# Patient Record
Sex: MALE | Race: WHITE | Employment: OTHER | URBAN - METROPOLITAN AREA
[De-identification: names, ages, dates, MRNs, and addresses within clinical notes are randomized per-mention and may not be internally consistent; named-entity substitution may affect disease eponyms.]

---

## 2017-01-12 ENCOUNTER — OFFICE VISIT (OUTPATIENT)
Dept: FAMILY MEDICINE CLINIC | Age: 62
End: 2017-01-12

## 2017-01-12 VITALS
WEIGHT: 202 LBS | BODY MASS INDEX: 25.25 KG/M2 | DIASTOLIC BLOOD PRESSURE: 80 MMHG | RESPIRATION RATE: 16 BRPM | SYSTOLIC BLOOD PRESSURE: 140 MMHG | HEART RATE: 80 BPM

## 2017-01-12 DIAGNOSIS — M25.521 RIGHT ELBOW PAIN: ICD-10-CM

## 2017-01-12 DIAGNOSIS — M25.421 ELBOW EFFUSION, RIGHT: Primary | ICD-10-CM

## 2017-01-12 DIAGNOSIS — L03.113 CELLULITIS OF RIGHT UPPER EXTREMITY: ICD-10-CM

## 2017-01-12 PROCEDURE — 99213 OFFICE O/P EST LOW 20 MIN: CPT | Performed by: FAMILY MEDICINE

## 2017-01-12 RX ORDER — TRAMADOL HYDROCHLORIDE 50 MG/1
50 TABLET ORAL EVERY 6 HOURS PRN
Qty: 20 TABLET | Refills: 0 | Status: SHIPPED | OUTPATIENT
Start: 2017-01-12 | End: 2017-02-20 | Stop reason: ALTCHOICE

## 2017-01-12 RX ORDER — CLINDAMYCIN HYDROCHLORIDE 150 MG/1
150 CAPSULE ORAL 4 TIMES DAILY
Qty: 40 CAPSULE | Refills: 0 | Status: SHIPPED | OUTPATIENT
Start: 2017-01-12 | End: 2017-01-17

## 2017-01-12 RX ORDER — IBUPROFEN 200 MG
800 TABLET ORAL EVERY 8 HOURS PRN
COMMUNITY
End: 2019-01-04

## 2017-01-12 RX ORDER — PREDNISONE 20 MG/1
TABLET ORAL
Qty: 30 TABLET | Refills: 0 | Status: SHIPPED | OUTPATIENT
Start: 2017-01-12 | End: 2017-01-22

## 2017-01-12 ASSESSMENT — ENCOUNTER SYMPTOMS
ABDOMINAL DISTENTION: 0
EYE PAIN: 0
DIARRHEA: 0
SHORTNESS OF BREATH: 0
RHINORRHEA: 0
SINUS PRESSURE: 0
ABDOMINAL PAIN: 0
NAUSEA: 0
CONSTIPATION: 0
SORE THROAT: 0
COUGH: 0

## 2017-01-17 ENCOUNTER — OFFICE VISIT (OUTPATIENT)
Dept: FAMILY MEDICINE CLINIC | Age: 62
End: 2017-01-17

## 2017-01-17 VITALS
HEART RATE: 64 BPM | RESPIRATION RATE: 16 BRPM | DIASTOLIC BLOOD PRESSURE: 68 MMHG | BODY MASS INDEX: 25.75 KG/M2 | TEMPERATURE: 98.7 F | SYSTOLIC BLOOD PRESSURE: 142 MMHG | WEIGHT: 206 LBS

## 2017-01-17 DIAGNOSIS — M70.21 OLECRANON BURSITIS OF RIGHT ELBOW: Primary | ICD-10-CM

## 2017-01-17 PROCEDURE — 99213 OFFICE O/P EST LOW 20 MIN: CPT | Performed by: FAMILY MEDICINE

## 2017-01-17 ASSESSMENT — PATIENT HEALTH QUESTIONNAIRE - PHQ9
1. LITTLE INTEREST OR PLEASURE IN DOING THINGS: 0
SUM OF ALL RESPONSES TO PHQ9 QUESTIONS 1 & 2: 0
2. FEELING DOWN, DEPRESSED OR HOPELESS: 0
SUM OF ALL RESPONSES TO PHQ QUESTIONS 1-9: 0

## 2017-01-17 ASSESSMENT — ENCOUNTER SYMPTOMS
ABDOMINAL PAIN: 0
DIARRHEA: 0
CONSTIPATION: 0
SHORTNESS OF BREATH: 0
EYE PAIN: 0
ABDOMINAL DISTENTION: 0
RHINORRHEA: 0
SORE THROAT: 0
NAUSEA: 0
COUGH: 0
SINUS PRESSURE: 0

## 2017-03-06 ENCOUNTER — OFFICE VISIT (OUTPATIENT)
Dept: FAMILY MEDICINE CLINIC | Age: 62
End: 2017-03-06

## 2017-03-06 VITALS
HEIGHT: 75 IN | RESPIRATION RATE: 16 BRPM | DIASTOLIC BLOOD PRESSURE: 72 MMHG | TEMPERATURE: 98.4 F | HEART RATE: 72 BPM | BODY MASS INDEX: 24.74 KG/M2 | WEIGHT: 199 LBS | SYSTOLIC BLOOD PRESSURE: 140 MMHG

## 2017-03-06 DIAGNOSIS — M15.9 PRIMARY OSTEOARTHRITIS INVOLVING MULTIPLE JOINTS: Chronic | ICD-10-CM

## 2017-03-06 DIAGNOSIS — E34.9 HYPOTESTOSTERONISM: Chronic | ICD-10-CM

## 2017-03-06 DIAGNOSIS — J40 BRONCHITIS: Primary | ICD-10-CM

## 2017-03-06 DIAGNOSIS — R63.4 WEIGHT LOSS: ICD-10-CM

## 2017-03-06 DIAGNOSIS — R53.83 FATIGUE, UNSPECIFIED TYPE: ICD-10-CM

## 2017-03-06 PROCEDURE — 99213 OFFICE O/P EST LOW 20 MIN: CPT | Performed by: NURSE PRACTITIONER

## 2017-03-06 RX ORDER — PREDNISONE 20 MG/1
20 TABLET ORAL 2 TIMES DAILY
Qty: 10 TABLET | Refills: 0 | Status: SHIPPED | OUTPATIENT
Start: 2017-03-06 | End: 2017-03-11

## 2017-03-06 RX ORDER — DEXTROMETHORPHAN HYDROBROMIDE AND PROMETHAZINE HYDROCHLORIDE 15; 6.25 MG/5ML; MG/5ML
5 SYRUP ORAL 3 TIMES DAILY PRN
Qty: 120 ML | Refills: 0 | Status: SHIPPED | OUTPATIENT
Start: 2017-03-06 | End: 2017-03-13

## 2017-03-06 RX ORDER — AZITHROMYCIN 250 MG/1
TABLET, FILM COATED ORAL
Qty: 6 TABLET | Refills: 0 | Status: SHIPPED | OUTPATIENT
Start: 2017-03-06 | End: 2017-03-16

## 2017-03-10 ASSESSMENT — ENCOUNTER SYMPTOMS
GASTROINTESTINAL NEGATIVE: 1
EYES NEGATIVE: 1
COUGH: 1
SINUS PRESSURE: 1

## 2017-10-16 RX ORDER — LISINOPRIL 20 MG/1
TABLET ORAL
Qty: 30 TABLET | Refills: 5 | Status: SHIPPED | OUTPATIENT
Start: 2017-10-16 | End: 2018-04-21 | Stop reason: SDUPTHER

## 2017-10-30 ENCOUNTER — TELEPHONE (OUTPATIENT)
Dept: FAMILY MEDICINE CLINIC | Age: 62
End: 2017-10-30

## 2017-10-30 NOTE — TELEPHONE ENCOUNTER
Patient is wanting an appt today for his swollen inflamed left ankle, he had the same thing with his elbow previously. He needs to get in prior to going into work this afternoon.

## 2018-04-23 RX ORDER — LISINOPRIL 20 MG/1
TABLET ORAL
Qty: 30 TABLET | Refills: 0 | Status: SHIPPED | OUTPATIENT
Start: 2018-04-23 | End: 2018-05-23 | Stop reason: SDUPTHER

## 2018-05-23 ENCOUNTER — OFFICE VISIT (OUTPATIENT)
Dept: FAMILY MEDICINE CLINIC | Age: 63
End: 2018-05-23
Payer: COMMERCIAL

## 2018-05-23 VITALS
BODY MASS INDEX: 24.62 KG/M2 | SYSTOLIC BLOOD PRESSURE: 134 MMHG | HEIGHT: 75 IN | WEIGHT: 198 LBS | RESPIRATION RATE: 16 BRPM | DIASTOLIC BLOOD PRESSURE: 70 MMHG | HEART RATE: 68 BPM

## 2018-05-23 DIAGNOSIS — Z12.5 SCREENING PSA (PROSTATE SPECIFIC ANTIGEN): ICD-10-CM

## 2018-05-23 DIAGNOSIS — I10 ESSENTIAL HYPERTENSION: Primary | ICD-10-CM

## 2018-05-23 DIAGNOSIS — Z11.59 NEED FOR HEPATITIS C SCREENING TEST: ICD-10-CM

## 2018-05-23 PROCEDURE — 99213 OFFICE O/P EST LOW 20 MIN: CPT | Performed by: FAMILY MEDICINE

## 2018-05-23 RX ORDER — LISINOPRIL 20 MG/1
TABLET ORAL
Qty: 90 TABLET | Refills: 3 | Status: SHIPPED | OUTPATIENT
Start: 2018-05-23 | End: 2019-06-10 | Stop reason: SDUPTHER

## 2018-05-24 ASSESSMENT — ENCOUNTER SYMPTOMS
SINUS PRESSURE: 0
SORE THROAT: 0
ABDOMINAL DISTENTION: 0
RHINORRHEA: 0
CONSTIPATION: 0
NAUSEA: 0
SHORTNESS OF BREATH: 0
COUGH: 0
EYE PAIN: 0
DIARRHEA: 0
ABDOMINAL PAIN: 0

## 2018-09-11 ENCOUNTER — HOSPITAL ENCOUNTER (OUTPATIENT)
Age: 63
Discharge: HOME | End: 2018-09-11
Payer: COMMERCIAL

## 2018-09-11 DIAGNOSIS — I10 ESSENTIAL HYPERTENSION: ICD-10-CM

## 2018-09-11 DIAGNOSIS — Z12.5 SCREENING PSA (PROSTATE SPECIFIC ANTIGEN): ICD-10-CM

## 2018-09-11 DIAGNOSIS — Z11.59 NEED FOR HEPATITIS C SCREENING TEST: ICD-10-CM

## 2018-09-11 LAB
ALBUMIN SERPL-MCNC: 4.8 G/DL (ref 3.5–5.1)
ALP BLD-CCNC: 103 U/L (ref 38–126)
ALT SERPL-CCNC: 18 U/L (ref 11–66)
ANION GAP SERPL CALCULATED.3IONS-SCNC: 17 MEQ/L (ref 8–16)
AST SERPL-CCNC: 22 U/L (ref 5–40)
BASOPHILS # BLD: 0.9 %
BASOPHILS ABSOLUTE: 0.1 THOU/MM3 (ref 0–0.1)
BILIRUB SERPL-MCNC: 1.7 MG/DL (ref 0.3–1.2)
BUN BLDV-MCNC: 17 MG/DL (ref 7–22)
CALCIUM SERPL-MCNC: 9.5 MG/DL (ref 8.5–10.5)
CHLORIDE BLD-SCNC: 102 MEQ/L (ref 98–111)
CHOLESTEROL, TOTAL: 206 MG/DL (ref 100–199)
CO2: 25 MEQ/L (ref 23–33)
CREAT SERPL-MCNC: 0.7 MG/DL (ref 0.4–1.2)
EOSINOPHIL # BLD: 4.3 %
EOSINOPHILS ABSOLUTE: 0.4 THOU/MM3 (ref 0–0.4)
ERYTHROCYTE [DISTWIDTH] IN BLOOD BY AUTOMATED COUNT: 13.5 % (ref 11.5–14.5)
ERYTHROCYTE [DISTWIDTH] IN BLOOD BY AUTOMATED COUNT: 42.5 FL (ref 35–45)
GFR SERPL CREATININE-BSD FRML MDRD: > 90 ML/MIN/1.73M2
GLUCOSE BLD-MCNC: 109 MG/DL (ref 70–108)
HCT VFR BLD CALC: 43.2 % (ref 42–52)
HDLC SERPL-MCNC: 69 MG/DL
HEMOGLOBIN: 14 GM/DL (ref 14–18)
HEPATITIS C ANTIBODY: NEGATIVE
IMMATURE GRANS (ABS): 0.03 THOU/MM3 (ref 0–0.07)
IMMATURE GRANULOCYTES: 0.3 %
LDL CHOLESTEROL CALCULATED: 98 MG/DL
LYMPHOCYTES # BLD: 25.2 %
LYMPHOCYTES ABSOLUTE: 2.4 THOU/MM3 (ref 1–4.8)
MCH RBC QN AUTO: 28 PG (ref 26–33)
MCHC RBC AUTO-ENTMCNC: 32.4 GM/DL (ref 32.2–35.5)
MCV RBC AUTO: 86.4 FL (ref 80–94)
MONOCYTES # BLD: 5.6 %
MONOCYTES ABSOLUTE: 0.5 THOU/MM3 (ref 0.4–1.3)
NUCLEATED RED BLOOD CELLS: 0 /100 WBC
PLATELET # BLD: 286 THOU/MM3 (ref 130–400)
PMV BLD AUTO: 10.2 FL (ref 9.4–12.4)
POTASSIUM SERPL-SCNC: 4.2 MEQ/L (ref 3.5–5.2)
PROSTATE SPECIFIC ANTIGEN: 0.55 NG/ML (ref 0–1)
RBC # BLD: 5 MILL/MM3 (ref 4.7–6.1)
SEG NEUTROPHILS: 63.7 %
SEGMENTED NEUTROPHILS ABSOLUTE COUNT: 6.1 THOU/MM3 (ref 1.8–7.7)
SODIUM BLD-SCNC: 144 MEQ/L (ref 135–145)
TOTAL PROTEIN: 7.4 G/DL (ref 6.1–8)
TRIGL SERPL-MCNC: 195 MG/DL (ref 0–199)
WBC # BLD: 9.6 THOU/MM3 (ref 4.8–10.8)

## 2018-09-11 PROCEDURE — 36415 COLL VENOUS BLD VENIPUNCTURE: CPT

## 2018-09-11 PROCEDURE — G0103 PSA SCREENING: HCPCS

## 2018-09-11 PROCEDURE — 80053 COMPREHEN METABOLIC PANEL: CPT

## 2018-09-11 PROCEDURE — 85025 COMPLETE CBC W/AUTO DIFF WBC: CPT

## 2018-09-11 PROCEDURE — 86803 HEPATITIS C AB TEST: CPT

## 2018-09-11 PROCEDURE — 80061 LIPID PANEL: CPT

## 2018-09-14 ENCOUNTER — TELEPHONE (OUTPATIENT)
Dept: FAMILY MEDICINE CLINIC | Age: 63
End: 2018-09-14

## 2018-09-14 NOTE — TELEPHONE ENCOUNTER
I left a message for the patient to call our office back in regards to lab results. Notified of phone hours.

## 2019-01-04 ENCOUNTER — OFFICE VISIT (OUTPATIENT)
Dept: FAMILY MEDICINE CLINIC | Age: 64
End: 2019-01-04
Payer: COMMERCIAL

## 2019-01-04 VITALS
HEIGHT: 74 IN | RESPIRATION RATE: 16 BRPM | HEART RATE: 82 BPM | BODY MASS INDEX: 26.18 KG/M2 | DIASTOLIC BLOOD PRESSURE: 76 MMHG | WEIGHT: 204 LBS | SYSTOLIC BLOOD PRESSURE: 140 MMHG | TEMPERATURE: 98.8 F

## 2019-01-04 DIAGNOSIS — I10 ESSENTIAL HYPERTENSION: ICD-10-CM

## 2019-01-04 DIAGNOSIS — L97.512 SKIN ULCER OF RIGHT FOOT WITH FAT LAYER EXPOSED (HCC): ICD-10-CM

## 2019-01-04 DIAGNOSIS — S91.301A OPEN WOUND OF RIGHT FOOT, INITIAL ENCOUNTER: Primary | ICD-10-CM

## 2019-01-04 PROCEDURE — 99213 OFFICE O/P EST LOW 20 MIN: CPT | Performed by: NURSE PRACTITIONER

## 2019-01-04 RX ORDER — M-VIT,TX,IRON,MINS/CALC/FOLIC 27MG-0.4MG
1 TABLET ORAL DAILY
COMMUNITY
End: 2019-12-03 | Stop reason: ALTCHOICE

## 2019-01-04 RX ORDER — SULFAMETHOXAZOLE AND TRIMETHOPRIM 800; 160 MG/1; MG/1
1 TABLET ORAL 2 TIMES DAILY
Qty: 28 TABLET | Refills: 0 | Status: SHIPPED | OUTPATIENT
Start: 2019-01-04 | End: 2019-01-09 | Stop reason: ALTCHOICE

## 2019-01-04 ASSESSMENT — PATIENT HEALTH QUESTIONNAIRE - PHQ9
1. LITTLE INTEREST OR PLEASURE IN DOING THINGS: 0
2. FEELING DOWN, DEPRESSED OR HOPELESS: 0
SUM OF ALL RESPONSES TO PHQ QUESTIONS 1-9: 0
SUM OF ALL RESPONSES TO PHQ QUESTIONS 1-9: 0
SUM OF ALL RESPONSES TO PHQ9 QUESTIONS 1 & 2: 0

## 2019-01-07 ENCOUNTER — TELEPHONE (OUTPATIENT)
Dept: FAMILY MEDICINE CLINIC | Age: 64
End: 2019-01-07

## 2019-01-09 LAB
AEROBIC CULTURE: ABNORMAL
AEROBIC CULTURE: ABNORMAL
ANAEROBIC CULTURE: ABNORMAL
GRAM STAIN RESULT: ABNORMAL
ORGANISM: ABNORMAL

## 2019-01-09 RX ORDER — CEPHALEXIN 500 MG/1
500 CAPSULE ORAL 4 TIMES DAILY
Qty: 40 CAPSULE | Refills: 0 | Status: SHIPPED | OUTPATIENT
Start: 2019-01-09 | End: 2019-07-24 | Stop reason: SDUPTHER

## 2019-06-10 DIAGNOSIS — I10 ESSENTIAL HYPERTENSION: ICD-10-CM

## 2019-06-10 RX ORDER — LISINOPRIL 20 MG/1
TABLET ORAL
Qty: 90 TABLET | Refills: 1 | Status: SHIPPED | OUTPATIENT
Start: 2019-06-10 | End: 2019-12-05 | Stop reason: SDUPTHER

## 2019-06-10 NOTE — TELEPHONE ENCOUNTER
06/10/2019   Patric Gusman called requesting a refill on the following medications:  Requested Prescriptions     Pending Prescriptions Disp Refills    lisinopril (PRINIVIL;ZESTRIL) 20 MG tablet 90 tablet 3     Sig: take 1 tablet by mouth once daily     Pharmacy verified:  [email or web address]      Date of last visit: 01/04/2019  Date of next visit (if applicable): Visit date not found

## 2019-07-24 ENCOUNTER — OFFICE VISIT (OUTPATIENT)
Dept: FAMILY MEDICINE CLINIC | Age: 64
End: 2019-07-24
Payer: COMMERCIAL

## 2019-07-24 VITALS
SYSTOLIC BLOOD PRESSURE: 140 MMHG | RESPIRATION RATE: 24 BRPM | DIASTOLIC BLOOD PRESSURE: 86 MMHG | WEIGHT: 201 LBS | HEART RATE: 72 BPM | BODY MASS INDEX: 26.16 KG/M2 | TEMPERATURE: 97.8 F

## 2019-07-24 DIAGNOSIS — L97.511 SKIN ULCER OF RIGHT FOOT, LIMITED TO BREAKDOWN OF SKIN (HCC): Primary | ICD-10-CM

## 2019-07-24 PROCEDURE — 99213 OFFICE O/P EST LOW 20 MIN: CPT | Performed by: NURSE PRACTITIONER

## 2019-07-24 RX ORDER — CEPHALEXIN 500 MG/1
500 CAPSULE ORAL 4 TIMES DAILY
Qty: 40 CAPSULE | Refills: 0 | Status: SHIPPED | OUTPATIENT
Start: 2019-07-24 | End: 2020-03-17 | Stop reason: SDUPTHER

## 2019-07-24 NOTE — LETTER
[address]  [address]  [facility] [address]  Phone: [phone]  Fax: [phone]    LEIA Lorenzana CNP        July 24, 2019     Patient: Elmo Pierce   YOB: 1955   Date of Visit: 7/24/2019       To Whom it May Concern:    Adam Webster was seen in my clinic on 7/24/2019. He may return to work on 7/28/19. If you have any questions or concerns, please don't hesitate to call.     Sincerely,         LEIA Lorenzana CNP

## 2019-07-26 LAB
AEROBIC CULTURE: NORMAL
GRAM STAIN RESULT: NORMAL

## 2019-07-29 ENCOUNTER — TELEPHONE (OUTPATIENT)
Dept: FAMILY MEDICINE CLINIC | Age: 64
End: 2019-07-29

## 2019-07-29 DIAGNOSIS — L97.511 SKIN ULCER OF RIGHT FOOT, LIMITED TO BREAKDOWN OF SKIN (HCC): Primary | ICD-10-CM

## 2019-07-30 ENCOUNTER — TELEPHONE (OUTPATIENT)
Dept: FAMILY MEDICINE CLINIC | Age: 64
End: 2019-07-30

## 2019-07-31 ASSESSMENT — ENCOUNTER SYMPTOMS
EYES NEGATIVE: 1
RESPIRATORY NEGATIVE: 1
GASTROINTESTINAL NEGATIVE: 1

## 2019-07-31 NOTE — PROGRESS NOTES
[address] [facility] [address]  Dept: [phone]  Dept Fax: [phone]  Loc: [phone]  PROGRESS NOTE      VisitDate: 7/24/2019    Joie Pineda is a 62 y.o. male who presents today for:     Chief Complaint   Patient presents with    Wound Infection     Open wound on right foot. 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 He says it started as a pinhole and is getting bigger. He has been cleaning it with peroxide and alcohol only. Not putting any antibiotic ointments on it. Subjective:  Presents with complaint of an open wound on the medial aspect right foot developed several weeks ago. He has had a history of repeated wound/ulcers to foot. Reports that he works in a [address] where he wears work boots that caused skin breakdown. Reports that ulcer has increased in size over the last week. Reports that it started as a pinhole. Has been applying peroxide and alcohol daily. Review of Systems   Constitutional: Negative. HENT: Negative. Eyes: Negative. Respiratory: Negative. Cardiovascular: Negative. Gastrointestinal: Negative. Endocrine: Negative. Genitourinary: Negative. Musculoskeletal: Positive for arthralgias and gait problem. Skin: Positive for wound. Hematological: Negative. Psychiatric/Behavioral: Negative.       Past Medical History:   Diagnosis Date    Blood circulation, collateral     Gout     Hypertension     Hypotestosteronism 5/24/2013    Osteoarthritis       Past Surgical History:   Procedure Laterality Date    APPENDECTOMY      HERNIA REPAIR  2004    ABDOMINAL    OTHER SURGICAL HISTORY  2015    excision of scrotal wall lesion     Family History   Problem Relation Age of Onset    Diabetes Maternal Grandmother     Heart Disease Maternal Grandmother         PACEMAKER     Social History     Tobacco Use    Smoking status: Never Smoker    Smokeless tobacco: Never Used   Substance Use Topics    Alcohol use: Yes     Comment: socially      Current Outpatient Medications   Medication Sig Dispense Refill    cephALEXin (KEFLEX) 500 MG capsule Take 1 capsule by mouth 4 times daily for 10 days 40 capsule 0    lisinopril (PRINIVIL;ZESTRIL) 20 MG tablet take 1 tablet by mouth once daily 90 tablet 1    Multiple Vitamins-Minerals (THERAPEUTIC MULTIVITAMIN-MINERALS) tablet Take 1 tablet by mouth daily       No current facility-administered medications for this visit. Allergies   Allergen Reactions    Amoxil [Amoxicillin] Itching     Health Maintenance   Topic Date Due    HIV screen  06/28/1971    DTaP/Tdap/Td vaccine (1 - Tdap) 06/28/1975    Diabetes screen  06/28/1996    Shingles Vaccine (1 of 2) 06/28/2006    Flu vaccine (1) 09/01/2019    Potassium monitoring  09/11/2019    Creatinine monitoring  09/11/2019    Lipid screen  09/11/2023    Colon cancer screen colonoscopy  10/14/2023    Hepatitis C screen  Completed    Pneumococcal 0-64 years Vaccine  Aged Out         Objective:     Physical Exam   Constitutional: He is oriented to person, place, and time. He appears well-developed and well-nourished. HENT:   Head: Normocephalic. Right Ear: External ear normal.   Left Ear: External ear normal.   Nose: Nose normal.   Mouth/Throat: Oropharynx is clear and moist.   Eyes: Pupils are equal, round, and reactive to light. Conjunctivae and EOM are normal.   Neck: Normal range of motion. Neck supple. Cardiovascular: Normal rate, regular rhythm, normal heart sounds and intact distal pulses. Pulmonary/Chest: Effort normal and breath sounds normal.   Abdominal: Soft. Musculoskeletal: Normal range of motion. Right foot: There is deformity. Feet:    Severe degenerative deformities noted to right foot and ankle. Ulceration noted to medial aspect right heel. multiple scars noted from previous ulcerations.   Current wound approximately 3 cm x 1

## 2019-08-06 ENCOUNTER — HOSPITAL ENCOUNTER (OUTPATIENT)
Dept: WOUND CARE | Age: 64
Discharge: HOME | End: 2019-08-06
Payer: COMMERCIAL

## 2019-08-06 VITALS
RESPIRATION RATE: 20 BRPM | TEMPERATURE: 98.3 F | OXYGEN SATURATION: 98 % | HEART RATE: 73 BPM | SYSTOLIC BLOOD PRESSURE: 175 MMHG | DIASTOLIC BLOOD PRESSURE: 75 MMHG

## 2019-08-06 PROCEDURE — 2709999900 HC NON-CHARGEABLE SUPPLY

## 2019-08-06 PROCEDURE — 2500000003 HC RX 250 WO HCPCS: Performed by: PODIATRIST

## 2019-08-06 PROCEDURE — 11042 DBRDMT SUBQ TIS 1ST 20SQCM/<: CPT

## 2019-08-06 PROCEDURE — 87070 CULTURE OTHR SPECIMN AEROBIC: CPT

## 2019-08-06 PROCEDURE — 87205 SMEAR GRAM STAIN: CPT

## 2019-08-06 PROCEDURE — 87075 CULTR BACTERIA EXCEPT BLOOD: CPT

## 2019-08-06 PROCEDURE — 97597 DBRDMT OPN WND 1ST 20 CM/<: CPT

## 2019-08-06 RX ORDER — LIDOCAINE HYDROCHLORIDE AND EPINEPHRINE BITARTRATE 20; .01 MG/ML; MG/ML
20 INJECTION, SOLUTION SUBCUTANEOUS ONCE
Status: COMPLETED | OUTPATIENT
Start: 2019-08-06 | End: 2019-08-06

## 2019-08-06 RX ORDER — CEPHALEXIN 500 MG/1
500 CAPSULE ORAL 4 TIMES DAILY
COMMUNITY
End: 2019-08-13 | Stop reason: ALTCHOICE

## 2019-08-06 RX ORDER — GREEN TEA/HOODIA GORDONII 315-12.5MG
1 CAPSULE ORAL DAILY
Qty: 15 TABLET | Refills: 0 | Status: SHIPPED | OUTPATIENT
Start: 2019-08-06 | End: 2019-08-13 | Stop reason: ALTCHOICE

## 2019-08-06 RX ORDER — CLINDAMYCIN HYDROCHLORIDE 150 MG/1
150 CAPSULE ORAL 4 TIMES DAILY
Qty: 40 CAPSULE | Refills: 0 | Status: SHIPPED | OUTPATIENT
Start: 2019-08-06 | End: 2019-08-16

## 2019-08-06 RX ORDER — DOXYCYCLINE HYCLATE 100 MG
100 TABLET ORAL 2 TIMES DAILY
Qty: 20 TABLET | Refills: 0 | Status: SHIPPED | OUTPATIENT
Start: 2019-08-06 | End: 2019-08-06

## 2019-08-06 RX ADMIN — LIDOCAINE HYDROCHLORIDE,EPINEPHRINE BITARTRATE 12 ML: 20; .01 INJECTION, SOLUTION INFILTRATION; PERINEURAL at 12:12

## 2019-08-06 ASSESSMENT — PAIN SCALES - GENERAL
PAINLEVEL_OUTOF10: 10
PAINLEVEL_OUTOF10: 10

## 2019-08-06 ASSESSMENT — PAIN DESCRIPTION - ORIENTATION: ORIENTATION: RIGHT

## 2019-08-06 ASSESSMENT — PAIN DESCRIPTION - PAIN TYPE: TYPE: ACUTE PAIN

## 2019-08-06 ASSESSMENT — PAIN DESCRIPTION - LOCATION: LOCATION: FOOT

## 2019-08-06 NOTE — PROGRESS NOTES
[address] and Procedure Note      [address] RECORD NUMBER:  [id number]  AGE: 62 y.o. GENDER: male  : 1955  EPISODE DATE:  2019    Subjective:     Chief Complaint   Patient presents with    Wound Check     right foot         HISTORY of PRESENT ILLNESS HPI     Martine Spatz is a 62 y.o. male New patient, who presents today for wound/ulcer evaluation. History of Wound Context: Patient presents with a right foot wound on his heel since . 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 Patient states that he has been cleaning it with hydrogen peroxide daily and went to go see his PCP who gave him oral Keflex. Patient is still on Keflex. Wound is very painful and has had some drainage when he change the dressing on . Patient denies any fever, chills, nausea, vomiting, diarrhea, chest pain or shortness of breath. Patient has no other complaints at this time.     Wound/Ulcer Pain Timing/Severity: constant  Quality of pain: sharp, aching  Severity:  5 / 10   Modifying Factors: Pain worsens with walking  Associated Signs/Symptoms: edema, erythema, drainage and pain        PAST MEDICAL HISTORY        Diagnosis Date    Blood circulation, collateral     Gout     Hypertension     Hypotestosteronism 2013    Osteoarthritis        PAST SURGICAL HISTORY    Past Surgical History:   Procedure Laterality Date    APPENDECTOMY      HERNIA REPAIR  2004    ABDOMINAL    OTHER SURGICAL HISTORY  2015    excision of scrotal wall lesion       FAMILY HISTORY    Family History   Problem Relation Age of Onset    Diabetes Maternal Grandmother     Heart Disease Maternal Grandmother         PACEMAKER       SOCIAL HISTORY    Social History     Tobacco Use    Smoking status: Never Smoker    Smokeless tobacco: Never Used   Substance Use Topics    Alcohol use: Yes     Comment: socially    Drug use: No       ALLERGIES    Allergies   Allergen Reactions    Amoxil [Amoxicillin] Itching       MEDICATIONS    Current Outpatient Medications on File Prior to Encounter   Medication Sig Dispense Refill    cephALEXin (KEFLEX) 500 MG capsule Take 500 mg by mouth 4 times daily      lisinopril (PRINIVIL;ZESTRIL) 20 MG tablet take 1 tablet by mouth once daily 90 tablet 1    Multiple Vitamins-Minerals (THERAPEUTIC MULTIVITAMIN-MINERALS) tablet Take 1 tablet by mouth daily       No current facility-administered medications on file prior to encounter. REVIEW OF SYSTEMS    A comprehensive review of systems was negative. Objective:      BP (!) 175/75   Pulse 73   Temp 98.3 °F (36.8 °C) (Oral)   Resp 20   SpO2 98%     Wt Readings from Last 3 Encounters:   07/24/19 201 lb (91.2 kg)   01/04/19 204 lb (92.5 kg)   05/23/18 198 lb (89.8 kg)       PHYSICAL EXAM    General Appearance: alert and oriented to person, place and time, well-developed and well-nourished, in no acute distress and alert and oriented to person, place and time    Physical Exam:   Vascular: Dorsalis pedis and posterior tibial pulses are palpable bilaterally. Skin temperature is warm to warm  from proximal tibial tuberosity to distal digits. CFT brisk to exposed digits. Edema  present. Hair growth present. Dermatologic: Full-thickness wound noted to the medial heel of the right foot. Base is fully fibrotic. There is periwound erythema, no lymphangitis. There is some purulent yellow discharge noted. No malodor. Minimal edema. Neurovascular: Gross sensation diminished    Musculoskeletal: Muscle strength testing deferred. Valgus deformity at the ankle joint on the right.     Wound 06/09/15 Other (Comment) Foot Right;Lateral #1 Right lateral foot  (Active)   Dressing Status Intact 9/8/2015 12:17 PM   Wound Cleansed Rinsed/Irrigated with saline 8/11/2015 11:00 AM   Wound Length (cm) 0.5 cm 9/8/2015 12:17 PM   Wound Width

## 2019-08-11 LAB
AEROBIC CULTURE: NORMAL
ANAEROBIC CULTURE: NORMAL
GRAM STAIN RESULT: NORMAL

## 2019-08-13 ENCOUNTER — HOSPITAL ENCOUNTER (OUTPATIENT)
Dept: WOUND CARE | Age: 64
Discharge: HOME | End: 2019-08-13
Payer: COMMERCIAL

## 2019-08-13 VITALS
OXYGEN SATURATION: 97 % | SYSTOLIC BLOOD PRESSURE: 151 MMHG | DIASTOLIC BLOOD PRESSURE: 75 MMHG | TEMPERATURE: 98.2 F | HEART RATE: 73 BPM | RESPIRATION RATE: 18 BRPM

## 2019-08-13 PROCEDURE — 97597 DBRDMT OPN WND 1ST 20 CM/<: CPT

## 2019-08-13 PROCEDURE — 2709999900 HC NON-CHARGEABLE SUPPLY

## 2019-08-13 PROCEDURE — 6370000000 HC RX 637 (ALT 250 FOR IP): Performed by: PODIATRIST

## 2019-08-13 RX ORDER — LIDOCAINE HYDROCHLORIDE 20 MG/ML
JELLY TOPICAL ONCE
Status: COMPLETED | OUTPATIENT
Start: 2019-08-13 | End: 2019-08-13

## 2019-08-13 RX ADMIN — LIDOCAINE HYDROCHLORIDE: 20 JELLY TOPICAL at 13:59

## 2019-08-13 ASSESSMENT — PAIN SCALES - GENERAL: PAINLEVEL_OUTOF10: 0

## 2019-08-13 NOTE — PROGRESS NOTES
[facility]          Progress Note and Procedure Note      Yakelin Russo  MEDICAL RECORD NUMBER:  [id number]  AGE: 62 y.o. GENDER: male  : 1955  EPISODE DATE:  2019    Subjective:     Chief Complaint   Patient presents with    Wound Check     Right ankle         HISTORY of PRESENT ILLNESS HPI     Yakelin Russo is a 62 y.o. male Established patient, who presents today for wound/ulcer evaluation. History of Wound Context: Patient has had a wound to the right foot since early July. Patient began seeking medical treatment late July when he was concerned for infection. Patient states he had a course of Keflex. Patient has been cleansing the wound with alcohol and Hibiclens, applying mupirocin to the genet-wound area an then applying Aquacel Ag with a dressing. Wound/Ulcer Pain Timing/Severity: moderate  Quality of pain: sharp, shooting  Severity:  7 / 10   Modifying Factors: Pain worsens with palpation and pressure   Associated Signs/Symptoms: pain    Interval History:   Patient presents today for follow up on wound/ulcer's progression. The patient is currently on antibiotics, will complete course of clindamycin on 19. Current dressing care includes Aquacel Ag as well as the patient's own wound care treatments of alcohol wash, Hibiclens, and applying mupirocin. Patient states the wound is very painful, but the pain is better today compared to when he was seen last week.          PAST MEDICAL HISTORY        Diagnosis Date    Blood circulation, collateral     Gout     Hypertension     Hypotestosteronism 2013    Osteoarthritis        PAST SURGICAL HISTORY    Past Surgical History:   Procedure Laterality Date    APPENDECTOMY      HERNIA REPAIR  2004    ABDOMINAL    OTHER SURGICAL HISTORY  2015    excision of scrotal wall lesion       FAMILY HISTORY    Family History   Problem Relation Age of Onset    Diabetes Maternal Grandmother     Heart Disease Maternal [phone]    If you experience any of the following, please call the Wound Care Service during business hours: Monday through Friday 8:00 am - 4:30 pm  ([phone]). *Increase in pain   *Temperature over 101   *Increase in drainage from your wound or a foul odor   *Uncontrolled swelling   *Need for compression bandage changes due to slippage, breakthrough drainage    If you need medical attention outside of business hours, please contact your Primary Care Doctor or go to the nearest emergency room.            Electronically signed by Marisela Adams DPM on 8/13/2019 at 2:06 PM

## 2019-08-13 NOTE — LETTER
[address]  Phone: [phone]    CHEIKH Turner        August 13, 2019     Patient: Negro May   YOB: 1955   Date of Visit: 8/13/2019       To Whom It May Concern: It is my medical opinion that Abraham Fleeting should remain out of work until September 10. If you have any questions or concerns, please don't hesitate to call.     Sincerely,        CHEIKH Turner

## 2019-08-27 ENCOUNTER — HOSPITAL ENCOUNTER (OUTPATIENT)
Dept: WOUND CARE | Age: 64
Discharge: HOME | End: 2019-08-27
Payer: COMMERCIAL

## 2019-08-27 VITALS
OXYGEN SATURATION: 97 % | HEART RATE: 82 BPM | TEMPERATURE: 98.5 F | RESPIRATION RATE: 16 BRPM | DIASTOLIC BLOOD PRESSURE: 89 MMHG | SYSTOLIC BLOOD PRESSURE: 161 MMHG

## 2019-08-27 PROCEDURE — 6370000000 HC RX 637 (ALT 250 FOR IP): Performed by: PODIATRIST

## 2019-08-27 PROCEDURE — 97597 DBRDMT OPN WND 1ST 20 CM/<: CPT

## 2019-08-27 PROCEDURE — 2709999900 HC NON-CHARGEABLE SUPPLY

## 2019-08-27 RX ORDER — LIDOCAINE HYDROCHLORIDE 20 MG/ML
JELLY TOPICAL ONCE
Status: COMPLETED | OUTPATIENT
Start: 2019-08-27 | End: 2019-08-27

## 2019-08-27 RX ADMIN — LIDOCAINE HYDROCHLORIDE: 20 JELLY TOPICAL at 14:07

## 2019-08-27 ASSESSMENT — PAIN SCALES - GENERAL: PAINLEVEL_OUTOF10: 3

## 2019-08-27 ASSESSMENT — PAIN DESCRIPTION - LOCATION: LOCATION: ANKLE

## 2019-08-27 ASSESSMENT — PAIN DESCRIPTION - PAIN TYPE: TYPE: CHRONIC PAIN

## 2019-08-27 ASSESSMENT — PAIN DESCRIPTION - ORIENTATION: ORIENTATION: RIGHT

## 2019-09-04 ENCOUNTER — OFFICE VISIT (OUTPATIENT)
Dept: FAMILY MEDICINE CLINIC | Age: 64
End: 2019-09-04
Payer: COMMERCIAL

## 2019-09-04 VITALS
HEART RATE: 78 BPM | SYSTOLIC BLOOD PRESSURE: 138 MMHG | BODY MASS INDEX: 25.64 KG/M2 | RESPIRATION RATE: 16 BRPM | DIASTOLIC BLOOD PRESSURE: 66 MMHG | WEIGHT: 197 LBS | TEMPERATURE: 98.3 F

## 2019-09-04 DIAGNOSIS — I10 ESSENTIAL HYPERTENSION: ICD-10-CM

## 2019-09-04 DIAGNOSIS — S46.211A RUPTURE OF RIGHT BICEPS TENDON, INITIAL ENCOUNTER: Primary | ICD-10-CM

## 2019-09-04 PROCEDURE — 99213 OFFICE O/P EST LOW 20 MIN: CPT | Performed by: FAMILY MEDICINE

## 2019-09-04 ASSESSMENT — ENCOUNTER SYMPTOMS
SINUS PRESSURE: 0
RHINORRHEA: 0
COUGH: 0
SHORTNESS OF BREATH: 0
CONSTIPATION: 0
EYE PAIN: 0
DIARRHEA: 0
NAUSEA: 0
ABDOMINAL DISTENTION: 0
ABDOMINAL PAIN: 0
SORE THROAT: 0

## 2019-09-10 ENCOUNTER — HOSPITAL ENCOUNTER (OUTPATIENT)
Dept: WOUND CARE | Age: 64
Discharge: HOME | End: 2019-09-10
Payer: COMMERCIAL

## 2019-09-10 VITALS
RESPIRATION RATE: 18 BRPM | TEMPERATURE: 98.4 F | OXYGEN SATURATION: 97 % | HEIGHT: 75 IN | HEART RATE: 77 BPM | WEIGHT: 197 LBS | SYSTOLIC BLOOD PRESSURE: 164 MMHG | DIASTOLIC BLOOD PRESSURE: 94 MMHG | BODY MASS INDEX: 24.49 KG/M2

## 2019-09-10 DIAGNOSIS — L97.519 ULCER OF RIGHT FOOT, UNSPECIFIED ULCER STAGE (HCC): Primary | ICD-10-CM

## 2019-09-10 PROCEDURE — 6370000000 HC RX 637 (ALT 250 FOR IP): Performed by: PODIATRIST

## 2019-09-10 PROCEDURE — 2709999900 HC NON-CHARGEABLE SUPPLY

## 2019-09-10 PROCEDURE — 97597 DBRDMT OPN WND 1ST 20 CM/<: CPT

## 2019-09-10 RX ORDER — LIDOCAINE HYDROCHLORIDE 20 MG/ML
JELLY TOPICAL PRN
Status: DISCONTINUED | OUTPATIENT
Start: 2019-09-10 | End: 2019-09-11 | Stop reason: HOSPADM

## 2019-09-10 RX ADMIN — LIDOCAINE HYDROCHLORIDE 6 ML: 20 JELLY TOPICAL at 15:01

## 2019-09-10 ASSESSMENT — PAIN DESCRIPTION - ORIENTATION: ORIENTATION: RIGHT

## 2019-09-10 ASSESSMENT — PAIN SCALES - GENERAL: PAINLEVEL_OUTOF10: 5

## 2019-09-10 ASSESSMENT — PAIN DESCRIPTION - PAIN TYPE: TYPE: ACUTE PAIN

## 2019-09-10 ASSESSMENT — PAIN DESCRIPTION - LOCATION: LOCATION: ANKLE

## 2019-09-10 NOTE — PROGRESS NOTES
1:47 PM   Red%Wound Bed 10 9/10/2019  2:49 PM   Yellow%Wound Bed 90 9/10/2019  2:49 PM   Number of days: 35       LABS      CBC:   Lab Results   Component Value Date    WBC 9.6 09/11/2018    HGB 14.0 09/11/2018    HCT 43.2 09/11/2018    MCV 86.4 09/11/2018     09/11/2018     BMP:   Lab Results   Component Value Date     09/11/2018    K 4.2 09/11/2018     09/11/2018    CO2 25 09/11/2018    BUN 17 09/11/2018    CREATININE 0.7 09/11/2018     PT/INR: No results found for: PROTIME, INR  Prealbumin: No results found for: PREALBUMIN  Albumin:  Lab Results   Component Value Date    LABALBU 4.8 09/11/2018    LABALBU 5.0 02/21/2012     Sed Rate:No results found for: Frantz Aisha  CRP: No results found for: CRP  Micro: No results found for: BC   Hemoglobin A1C: No results found for: LABA1C    Assessment:     Ulcer Identification:  Ulcer Type: pressure and traumatic  Contributing Factors: shear force    Wound: N/A    Depth of Diabetic/Pressure/Non Pressure Ulcers or Wound:  Wound, full thickness    Patient Active Problem List   Diagnosis Code    Osteoarthritis M19.90    Essential hypertension I10    Hyperlipidemia E78.5    Hypotestosteronism E34.9    Ulcer of other part of foot L97.509    Pain in limb M79.609    Venous (peripheral) insufficiency I87.2    Scrotal skin lesion N50.9       Procedure Note  Indications:  Based on my examination of this patient's wound(s)/ulcer(s) today, debridement is required to promote healing and evaluate the extent healing. Performed by: Sindhu Dahl DPM    Consent obtained: Yes    Time out taken:  Yes    Pain control: lidocaine 2%      Debridement:Non-excisional Debridement    Using curette the wound(s)/ulcer(s) was/were sharply debrided down through and including the removal of epidermis and dermis.         Devitalized Tissue Debrided:  fibrin and biofilm    Pre Debridement Measurements:  Are located in the [place]  Documentation Flow Sheet    Wound/Ulcer #:

## 2019-09-24 ENCOUNTER — HOSPITAL ENCOUNTER (OUTPATIENT)
Dept: WOUND CARE | Age: 64
Discharge: HOME | End: 2019-09-24
Payer: COMMERCIAL

## 2019-09-24 VITALS
HEIGHT: 75 IN | RESPIRATION RATE: 18 BRPM | SYSTOLIC BLOOD PRESSURE: 147 MMHG | WEIGHT: 197 LBS | BODY MASS INDEX: 24.49 KG/M2 | DIASTOLIC BLOOD PRESSURE: 75 MMHG | TEMPERATURE: 98.2 F | HEART RATE: 76 BPM | OXYGEN SATURATION: 96 %

## 2019-09-24 DIAGNOSIS — M79.661 PAIN OF RIGHT LOWER LEG: Primary | ICD-10-CM

## 2019-09-24 DIAGNOSIS — I87.2 VENOUS (PERIPHERAL) INSUFFICIENCY: ICD-10-CM

## 2019-09-24 PROCEDURE — 97597 DBRDMT OPN WND 1ST 20 CM/<: CPT

## 2019-09-24 PROCEDURE — 2709999900 HC NON-CHARGEABLE SUPPLY

## 2019-09-24 PROCEDURE — 6370000000 HC RX 637 (ALT 250 FOR IP): Performed by: PODIATRIST

## 2019-09-24 PROCEDURE — 29580 STRAPPING UNNA BOOT: CPT

## 2019-09-24 RX ORDER — SULFAMETHOXAZOLE AND TRIMETHOPRIM 400; 80 MG/1; MG/1
2 TABLET ORAL DAILY
Qty: 28 TABLET | Refills: 0 | Status: SHIPPED | OUTPATIENT
Start: 2019-09-24 | End: 2019-10-08

## 2019-09-24 RX ADMIN — LIDOCAINE HYDROCHLORIDE 1 TUBE: 20 JELLY TOPICAL at 14:21

## 2019-09-24 ASSESSMENT — PAIN SCALES - GENERAL: PAINLEVEL_OUTOF10: 0

## 2019-09-24 NOTE — PROGRESS NOTES
Smoking status: Never Smoker    Smokeless tobacco: Never Used   Substance Use Topics    Alcohol use: Yes     Comment: socially    Drug use: No       ALLERGIES    Allergies   Allergen Reactions    Amoxil [Amoxicillin] Itching       MEDICATIONS    Current Outpatient Medications on File Prior to Encounter   Medication Sig Dispense Refill    lidocaine (XYLOCAINE) 2 % jelly Apply topically prior to wound care visit. 1 Tube 0    lisinopril (PRINIVIL;ZESTRIL) 20 MG tablet take 1 tablet by mouth once daily 90 tablet 1    Multiple Vitamins-Minerals (THERAPEUTIC MULTIVITAMIN-MINERALS) tablet Take 1 tablet by mouth daily       No current facility-administered medications on file prior to encounter. REVIEW OF SYSTEMS    Pertinent items are noted in HPI. Objective:      BP (!) 147/75   Pulse 76   Temp 98.2 °F (36.8 °C) (Oral)   Resp 18   Ht 6' 3\" (1.905 m)   Wt 197 lb (89.4 kg)   SpO2 96%   BMI 24.62 kg/m²     Wt Readings from Last 3 Encounters:   09/24/19 197 lb (89.4 kg)   09/10/19 197 lb (89.4 kg)   09/04/19 197 lb (89.4 kg)       PHYSICAL EXAM    General Appearance: alert and oriented to person, place and time, well-developed and well-nourished, in no acute distress     Vascular: Dorsalis pedis and posterior tibial pulses are palpable bilaterally. Skin temperature is warm to warm  from proximal tibial tuberosity to distal digits. CFT brisk to exposed digits. Edema  present. Hair growth present.      Dermatologic: Full-thickness wound noted to the medial heel of the right foot. Base is fibrotic.  There is erythema distal to wound and proximal/posteriorly. no lymphangitis.  No purulence, no drainage.  No malodor.  Minimal edema.     Neurovascular: Gross sensation diminished     Musculoskeletal: Muscle strength testing deferred.  Valgus deformity at the ankle joint on the right.               Wound 06/09/15 Other (Comment) Foot Right;Lateral #1 Right lateral foot  (Active)   Dressing Status Intact 9/8/2015 12:17 PM   Wound Cleansed Rinsed/Irrigated with saline 8/11/2015 11:00 AM   Wound Length (cm) 0.5 cm 9/8/2015 12:17 PM   Wound Width (cm) 0.3 cm 9/8/2015 12:17 PM   Wound Depth (cm)  scab 9/8/2015 12:17 PM   Calculated Wound Size (cm^2) (l*w) 0.15 cm^2 9/8/2015 12:17 PM   Change in Wound Size % (l*w) 44 7/21/2015 10:47 AM   Drainage Amount None 9/8/2015 12:17 PM   Drainage Description Serosanguinous; Yellow 7/21/2015 10:00 AM   Odor None 9/8/2015 12:17 PM   Margins Attached edges 9/8/2015 12:17 PM   [place]%Wound Bed 10 6/23/2015 11:31 AM   Yellow%Wound Bed 100 8/25/2015 12:40 PM   Black%Wound Bed 100 9/8/2015 12:17 PM   Number of days: 1568       Incision 07/29/15 Scrotum Anterior (Active)   Number of days: 1518       Wound 08/06/19 Ankle Right;Medial (Active)   Wound Image   9/24/2019  2:16 PM   Wound Venous 8/6/2019 11:41 AM   Dressing Status Intact; New drainage; Old drainage 9/24/2019  2:16 PM   Dressing Changed Changed/New 9/24/2019  2:16 PM   Dressing/Treatment Ace Wrap;Alginate with Ag 9/10/2019  2:49 PM   Wound Cleansed Rinsed/Irrigated with saline 9/24/2019  2:16 PM   Wound Length (cm) 1.5 cm 9/24/2019  2:16 PM   Wound Width (cm) 1.5 cm 9/24/2019  2:16 PM   Wound Depth (cm) 0.2 cm 9/24/2019  2:16 PM   Wound Surface Area (cm^2) 2.25 cm^2 9/24/2019  2:16 PM   Change in Wound Size % (l*w) -60.71 9/24/2019  2:16 PM   Wound Volume (cm^3) 0.45 cm^3 9/24/2019  2:16 PM   Wound Healing % 20 9/24/2019  2:16 PM   Post-Procedure Length (cm) 1.5 cm 8/27/2019  2:21 PM   Post-Procedure Width (cm) 1.5 cm 8/27/2019  2:21 PM   Post-Procedure Depth (cm) 0.3 cm 8/27/2019  2:21 PM   Post-Procedure Surface Area (cm^2) 2.25 cm^2 8/27/2019  2:21 PM   Post-Procedure Volume (cm^3) 0.68 cm^3 8/27/2019  2:21 PM   Wound Assessment Yellow;Red 9/24/2019  2:16 PM   Drainage Amount Small 9/24/2019  2:16 PM   Drainage Description Serosanguinous 9/24/2019  2:16 PM   Odor None 9/24/2019  2:16 PM   Margins Attached edges 9/24/2019  2:16 PM

## 2019-09-24 NOTE — PROGRESS NOTES
Chad CLARK has reviewed and agrees with DUNG Hollis LPN's shift  Assessment.     Yin Duran  9/24/2019

## 2019-10-01 ENCOUNTER — HOSPITAL ENCOUNTER (OUTPATIENT)
Dept: WOUND CARE | Age: 64
Discharge: HOME | End: 2019-10-01
Payer: COMMERCIAL

## 2019-10-01 VITALS
TEMPERATURE: 98.5 F | HEART RATE: 88 BPM | WEIGHT: 197 LBS | SYSTOLIC BLOOD PRESSURE: 163 MMHG | HEIGHT: 75 IN | OXYGEN SATURATION: 97 % | DIASTOLIC BLOOD PRESSURE: 76 MMHG | RESPIRATION RATE: 18 BRPM | BODY MASS INDEX: 24.49 KG/M2

## 2019-10-01 DIAGNOSIS — L97.512 SKIN ULCER OF RIGHT FOOT WITH FAT LAYER EXPOSED (HCC): Primary | ICD-10-CM

## 2019-10-01 PROCEDURE — 99213 OFFICE O/P EST LOW 20 MIN: CPT

## 2019-10-01 PROCEDURE — 2709999900 HC NON-CHARGEABLE SUPPLY

## 2019-10-01 ASSESSMENT — PAIN DESCRIPTION - ONSET: ONSET: ON-GOING

## 2019-10-01 ASSESSMENT — PAIN DESCRIPTION - PROGRESSION: CLINICAL_PROGRESSION: NOT CHANGED

## 2019-10-01 ASSESSMENT — PAIN DESCRIPTION - PAIN TYPE: TYPE: CHRONIC PAIN

## 2019-10-01 ASSESSMENT — PAIN DESCRIPTION - LOCATION: LOCATION: FOOT

## 2019-10-01 ASSESSMENT — PAIN DESCRIPTION - FREQUENCY: FREQUENCY: CONTINUOUS

## 2019-10-01 ASSESSMENT — PAIN DESCRIPTION - ORIENTATION: ORIENTATION: RIGHT

## 2019-10-01 ASSESSMENT — PAIN SCALES - GENERAL: PAINLEVEL_OUTOF10: 5

## 2019-10-08 ENCOUNTER — HOSPITAL ENCOUNTER (OUTPATIENT)
Dept: WOUND CARE | Age: 64
Discharge: HOME | End: 2019-10-08
Payer: COMMERCIAL

## 2019-10-08 ENCOUNTER — HOSPITAL ENCOUNTER (OUTPATIENT)
Dept: INTERVENTIONAL RADIOLOGY/VASCULAR | Age: 64
Discharge: HOME | End: 2019-10-08
Payer: COMMERCIAL

## 2019-10-08 VITALS
DIASTOLIC BLOOD PRESSURE: 88 MMHG | RESPIRATION RATE: 18 BRPM | HEART RATE: 89 BPM | WEIGHT: 197 LBS | BODY MASS INDEX: 24.49 KG/M2 | OXYGEN SATURATION: 97 % | HEIGHT: 75 IN | SYSTOLIC BLOOD PRESSURE: 141 MMHG | TEMPERATURE: 98.5 F

## 2019-10-08 DIAGNOSIS — L97.512 SKIN ULCER OF RIGHT FOOT WITH FAT LAYER EXPOSED (HCC): ICD-10-CM

## 2019-10-08 PROCEDURE — 93926 LOWER EXTREMITY STUDY: CPT

## 2019-10-08 PROCEDURE — 2709999900 HC NON-CHARGEABLE SUPPLY

## 2019-10-08 PROCEDURE — 99213 OFFICE O/P EST LOW 20 MIN: CPT

## 2019-10-08 RX ORDER — METHYLPREDNISOLONE 4 MG/1
TABLET ORAL
Qty: 1 KIT | Refills: 1 | Status: SHIPPED | OUTPATIENT
Start: 2019-10-08 | End: 2019-10-14

## 2019-10-08 ASSESSMENT — PAIN DESCRIPTION - PROGRESSION: CLINICAL_PROGRESSION: NOT CHANGED

## 2019-10-08 ASSESSMENT — PAIN DESCRIPTION - PAIN TYPE: TYPE: ACUTE PAIN;CHRONIC PAIN

## 2019-10-08 ASSESSMENT — PAIN SCALES - GENERAL: PAINLEVEL_OUTOF10: 5

## 2019-10-08 ASSESSMENT — PAIN DESCRIPTION - ORIENTATION: ORIENTATION: RIGHT

## 2019-10-08 ASSESSMENT — PAIN DESCRIPTION - LOCATION: LOCATION: ANKLE;ARM

## 2019-10-08 ASSESSMENT — PAIN DESCRIPTION - ONSET: ONSET: ON-GOING

## 2019-10-08 ASSESSMENT — PAIN DESCRIPTION - FREQUENCY: FREQUENCY: CONTINUOUS

## 2019-10-08 ASSESSMENT — PAIN DESCRIPTION - DESCRIPTORS: DESCRIPTORS: ACHING;CONSTANT

## 2019-10-15 ENCOUNTER — HOSPITAL ENCOUNTER (OUTPATIENT)
Dept: WOUND CARE | Age: 64
Discharge: HOME | End: 2019-10-15
Payer: COMMERCIAL

## 2019-10-15 VITALS
TEMPERATURE: 98.2 F | HEART RATE: 78 BPM | DIASTOLIC BLOOD PRESSURE: 80 MMHG | RESPIRATION RATE: 16 BRPM | OXYGEN SATURATION: 96 % | SYSTOLIC BLOOD PRESSURE: 169 MMHG

## 2019-10-15 DIAGNOSIS — M76.821 POSTERIOR TIBIAL TENDON DYSFUNCTION (PTTD) OF RIGHT LOWER EXTREMITY: ICD-10-CM

## 2019-10-15 DIAGNOSIS — M21.6X1 ACQUIRED EQUINUS DEFORMITY OF RIGHT FOOT: ICD-10-CM

## 2019-10-15 DIAGNOSIS — M21.071 VALGUS DEFORMITY OF FOOT, RIGHT: ICD-10-CM

## 2019-10-15 DIAGNOSIS — G89.29 CHRONIC TOE PAIN, RIGHT FOOT: ICD-10-CM

## 2019-10-15 DIAGNOSIS — M79.674 CHRONIC TOE PAIN, RIGHT FOOT: ICD-10-CM

## 2019-10-15 PROCEDURE — 99213 OFFICE O/P EST LOW 20 MIN: CPT

## 2019-10-15 PROCEDURE — 2709999900 HC NON-CHARGEABLE SUPPLY

## 2019-10-15 ASSESSMENT — PAIN SCALES - GENERAL: PAINLEVEL_OUTOF10: 0

## 2019-10-29 ENCOUNTER — HOSPITAL ENCOUNTER (OUTPATIENT)
Dept: WOUND CARE | Age: 64
Discharge: HOME | End: 2019-10-29
Payer: COMMERCIAL

## 2019-10-29 VITALS
DIASTOLIC BLOOD PRESSURE: 89 MMHG | TEMPERATURE: 98.4 F | OXYGEN SATURATION: 96 % | RESPIRATION RATE: 16 BRPM | SYSTOLIC BLOOD PRESSURE: 142 MMHG | HEART RATE: 102 BPM

## 2019-10-29 PROCEDURE — 2709999900 HC NON-CHARGEABLE SUPPLY

## 2019-10-29 PROCEDURE — 99212 OFFICE O/P EST SF 10 MIN: CPT

## 2019-10-29 ASSESSMENT — PAIN SCALES - GENERAL: PAINLEVEL_OUTOF10: 0

## 2019-11-12 ENCOUNTER — HOSPITAL ENCOUNTER (OUTPATIENT)
Dept: WOUND CARE | Age: 64
Discharge: HOME | End: 2019-11-12
Payer: COMMERCIAL

## 2019-11-12 VITALS
HEART RATE: 97 BPM | HEIGHT: 75 IN | DIASTOLIC BLOOD PRESSURE: 88 MMHG | BODY MASS INDEX: 24.49 KG/M2 | TEMPERATURE: 98 F | OXYGEN SATURATION: 98 % | WEIGHT: 197 LBS | SYSTOLIC BLOOD PRESSURE: 166 MMHG | RESPIRATION RATE: 18 BRPM

## 2019-11-12 PROCEDURE — 2709999900 HC NON-CHARGEABLE SUPPLY

## 2019-11-12 PROCEDURE — 99213 OFFICE O/P EST LOW 20 MIN: CPT

## 2019-11-12 ASSESSMENT — PAIN SCALES - GENERAL: PAINLEVEL_OUTOF10: 0

## 2019-11-25 ENCOUNTER — TELEPHONE (OUTPATIENT)
Dept: FAMILY MEDICINE CLINIC | Age: 64
End: 2019-11-25

## 2019-11-25 DIAGNOSIS — S46.211A RUPTURE OF RIGHT BICEPS TENDON, INITIAL ENCOUNTER: Primary | ICD-10-CM

## 2019-12-03 ENCOUNTER — HOSPITAL ENCOUNTER (OUTPATIENT)
Age: 64
Discharge: HOME | End: 2019-12-03
Payer: COMMERCIAL

## 2019-12-03 ENCOUNTER — HOSPITAL ENCOUNTER (OUTPATIENT)
Dept: WOUND CARE | Age: 64
Discharge: HOME | End: 2019-12-03
Payer: COMMERCIAL

## 2019-12-03 ENCOUNTER — HOSPITAL ENCOUNTER (OUTPATIENT)
Dept: GENERAL RADIOLOGY | Age: 64
Discharge: HOME | End: 2019-12-03
Payer: COMMERCIAL

## 2019-12-03 VITALS
HEART RATE: 75 BPM | TEMPERATURE: 98.6 F | RESPIRATION RATE: 16 BRPM | DIASTOLIC BLOOD PRESSURE: 87 MMHG | WEIGHT: 197 LBS | SYSTOLIC BLOOD PRESSURE: 160 MMHG | OXYGEN SATURATION: 97 % | BODY MASS INDEX: 24.49 KG/M2 | HEIGHT: 75 IN

## 2019-12-03 DIAGNOSIS — M21.071 VALGUS DEFORMITY OF FOOT, RIGHT: Primary | ICD-10-CM

## 2019-12-03 DIAGNOSIS — M21.071 VALGUS DEFORMITY OF FOOT, RIGHT: ICD-10-CM

## 2019-12-03 LAB
ANION GAP SERPL CALCULATED.3IONS-SCNC: 12 MEQ/L (ref 8–16)
BUN BLDV-MCNC: 13 MG/DL (ref 7–22)
CALCIUM SERPL-MCNC: 9.9 MG/DL (ref 8.5–10.5)
CHLORIDE BLD-SCNC: 104 MEQ/L (ref 98–111)
CO2: 26 MEQ/L (ref 23–33)
CREAT SERPL-MCNC: 0.7 MG/DL (ref 0.4–1.2)
EKG ATRIAL RATE: 70 BPM
EKG P AXIS: 38 DEGREES
EKG P-R INTERVAL: 170 MS
EKG Q-T INTERVAL: 412 MS
EKG QRS DURATION: 96 MS
EKG QTC CALCULATION (BAZETT): 444 MS
EKG R AXIS: 70 DEGREES
EKG T AXIS: 36 DEGREES
EKG VENTRICULAR RATE: 70 BPM
ERYTHROCYTE [DISTWIDTH] IN BLOOD BY AUTOMATED COUNT: 13.2 % (ref 11.5–14.5)
ERYTHROCYTE [DISTWIDTH] IN BLOOD BY AUTOMATED COUNT: 41.1 FL (ref 35–45)
GFR SERPL CREATININE-BSD FRML MDRD: > 90 ML/MIN/1.73M2
GLUCOSE BLD-MCNC: 91 MG/DL (ref 70–108)
HCT VFR BLD CALC: 43.1 % (ref 42–52)
HEMOGLOBIN: 13.8 GM/DL (ref 14–18)
MCH RBC QN AUTO: 27.7 PG (ref 26–33)
MCHC RBC AUTO-ENTMCNC: 32 GM/DL (ref 32.2–35.5)
MCV RBC AUTO: 86.5 FL (ref 80–94)
PLATELET # BLD: 287 THOU/MM3 (ref 130–400)
PMV BLD AUTO: 10.2 FL (ref 9.4–12.4)
POTASSIUM SERPL-SCNC: 3.9 MEQ/L (ref 3.5–5.2)
RBC # BLD: 4.98 MILL/MM3 (ref 4.7–6.1)
SODIUM BLD-SCNC: 142 MEQ/L (ref 135–145)
WBC # BLD: 9.7 THOU/MM3 (ref 4.8–10.8)

## 2019-12-03 PROCEDURE — 93010 ELECTROCARDIOGRAM REPORT: CPT | Performed by: INTERNAL MEDICINE

## 2019-12-03 PROCEDURE — 85027 COMPLETE CBC AUTOMATED: CPT

## 2019-12-03 PROCEDURE — 73630 X-RAY EXAM OF FOOT: CPT

## 2019-12-03 PROCEDURE — 36415 COLL VENOUS BLD VENIPUNCTURE: CPT

## 2019-12-03 PROCEDURE — 93005 ELECTROCARDIOGRAM TRACING: CPT | Performed by: PODIATRIST

## 2019-12-03 PROCEDURE — 80048 BASIC METABOLIC PNL TOTAL CA: CPT

## 2019-12-03 PROCEDURE — 99213 OFFICE O/P EST LOW 20 MIN: CPT

## 2019-12-03 ASSESSMENT — PAIN DESCRIPTION - ONSET: ONSET: ON-GOING

## 2019-12-03 ASSESSMENT — PAIN DESCRIPTION - ORIENTATION: ORIENTATION: RIGHT

## 2019-12-03 ASSESSMENT — PAIN DESCRIPTION - PROGRESSION: CLINICAL_PROGRESSION: NOT CHANGED

## 2019-12-03 ASSESSMENT — PAIN DESCRIPTION - DESCRIPTORS: DESCRIPTORS: CONSTANT

## 2019-12-03 ASSESSMENT — PAIN SCALES - GENERAL: PAINLEVEL_OUTOF10: 7

## 2019-12-03 ASSESSMENT — PAIN DESCRIPTION - FREQUENCY: FREQUENCY: CONTINUOUS

## 2019-12-03 ASSESSMENT — PAIN DESCRIPTION - PAIN TYPE: TYPE: CHRONIC PAIN

## 2019-12-03 ASSESSMENT — PAIN DESCRIPTION - LOCATION: LOCATION: ARM;SHOULDER

## 2019-12-05 DIAGNOSIS — I10 ESSENTIAL HYPERTENSION: ICD-10-CM

## 2019-12-05 RX ORDER — LISINOPRIL 20 MG/1
TABLET ORAL
Qty: 90 TABLET | Refills: 0 | Status: SHIPPED | OUTPATIENT
Start: 2019-12-05 | End: 2020-02-20

## 2019-12-12 ENCOUNTER — OFFICE VISIT (OUTPATIENT)
Dept: FAMILY MEDICINE CLINIC | Age: 64
End: 2019-12-12
Payer: COMMERCIAL

## 2019-12-12 VITALS
HEIGHT: 73 IN | HEART RATE: 68 BPM | SYSTOLIC BLOOD PRESSURE: 136 MMHG | TEMPERATURE: 98.5 F | RESPIRATION RATE: 20 BRPM | WEIGHT: 204 LBS | BODY MASS INDEX: 27.04 KG/M2 | DIASTOLIC BLOOD PRESSURE: 66 MMHG

## 2019-12-12 DIAGNOSIS — G89.29 CHRONIC TOE PAIN, RIGHT FOOT: ICD-10-CM

## 2019-12-12 DIAGNOSIS — I10 ESSENTIAL HYPERTENSION: ICD-10-CM

## 2019-12-12 DIAGNOSIS — M79.674 CHRONIC TOE PAIN, RIGHT FOOT: ICD-10-CM

## 2019-12-12 DIAGNOSIS — Z01.818 PREOP EXAMINATION: Primary | ICD-10-CM

## 2019-12-12 DIAGNOSIS — M21.6X1 ACQUIRED EQUINUS DEFORMITY OF RIGHT FOOT: ICD-10-CM

## 2019-12-12 PROCEDURE — 99242 OFF/OP CONSLTJ NEW/EST SF 20: CPT | Performed by: FAMILY MEDICINE

## 2019-12-15 ASSESSMENT — ENCOUNTER SYMPTOMS
ABDOMINAL PAIN: 0
RHINORRHEA: 0
NAUSEA: 0
CONSTIPATION: 0
DIARRHEA: 0
SINUS PRESSURE: 0
EYE PAIN: 0
COUGH: 0
SHORTNESS OF BREATH: 0
SORE THROAT: 0
ABDOMINAL DISTENTION: 0

## 2019-12-26 ENCOUNTER — ANESTHESIA EVENT (OUTPATIENT)
Dept: OPERATING ROOM | Age: 64
End: 2019-12-26
Payer: COMMERCIAL

## 2019-12-27 ENCOUNTER — APPOINTMENT (OUTPATIENT)
Dept: GENERAL RADIOLOGY | Age: 64
End: 2019-12-27
Attending: PODIATRIST
Payer: COMMERCIAL

## 2019-12-27 ENCOUNTER — ANESTHESIA (OUTPATIENT)
Dept: OPERATING ROOM | Age: 64
End: 2019-12-27
Payer: COMMERCIAL

## 2019-12-27 ENCOUNTER — HOSPITAL ENCOUNTER (OUTPATIENT)
Age: 64
Setting detail: OUTPATIENT SURGERY
Discharge: HOME | End: 2019-12-27
Attending: PODIATRIST | Admitting: PODIATRIST
Payer: COMMERCIAL

## 2019-12-27 VITALS
TEMPERATURE: 98.9 F | DIASTOLIC BLOOD PRESSURE: 60 MMHG | HEIGHT: 75 IN | BODY MASS INDEX: 25.39 KG/M2 | HEART RATE: 91 BPM | OXYGEN SATURATION: 97 % | RESPIRATION RATE: 12 BRPM | SYSTOLIC BLOOD PRESSURE: 118 MMHG | WEIGHT: 204.2 LBS

## 2019-12-27 VITALS
DIASTOLIC BLOOD PRESSURE: 37 MMHG | SYSTOLIC BLOOD PRESSURE: 83 MMHG | TEMPERATURE: 100 F | OXYGEN SATURATION: 99 % | RESPIRATION RATE: 2 BRPM

## 2019-12-27 DIAGNOSIS — M76.821 POSTERIOR TIBIAL TENDON DYSFUNCTION (PTTD) OF RIGHT LOWER EXTREMITY: ICD-10-CM

## 2019-12-27 DIAGNOSIS — M21.071 VALGUS DEFORMITY OF FOOT, RIGHT: Primary | ICD-10-CM

## 2019-12-27 DIAGNOSIS — G89.29 CHRONIC TOE PAIN, RIGHT FOOT: ICD-10-CM

## 2019-12-27 DIAGNOSIS — M79.674 CHRONIC TOE PAIN, RIGHT FOOT: ICD-10-CM

## 2019-12-27 PROCEDURE — 2500000003 HC RX 250 WO HCPCS: Performed by: NURSE ANESTHETIST, CERTIFIED REGISTERED

## 2019-12-27 PROCEDURE — 3209999900 FLUORO FOR SURGICAL PROCEDURES

## 2019-12-27 PROCEDURE — 3700000000 HC ANESTHESIA ATTENDED CARE: Performed by: PODIATRIST

## 2019-12-27 PROCEDURE — 3700000001 HC ADD 15 MINUTES (ANESTHESIA): Performed by: PODIATRIST

## 2019-12-27 PROCEDURE — 2580000003 HC RX 258: Performed by: NURSE ANESTHETIST, CERTIFIED REGISTERED

## 2019-12-27 PROCEDURE — 7100000011 HC PHASE II RECOVERY - ADDTL 15 MIN: Performed by: PODIATRIST

## 2019-12-27 PROCEDURE — 2709999900 HC NON-CHARGEABLE SUPPLY: Performed by: PODIATRIST

## 2019-12-27 PROCEDURE — 2780000010 HC IMPLANT OTHER: Performed by: PODIATRIST

## 2019-12-27 PROCEDURE — C1713 ANCHOR/SCREW BN/BN,TIS/BN: HCPCS | Performed by: PODIATRIST

## 2019-12-27 PROCEDURE — 2580000003 HC RX 258: Performed by: STUDENT IN AN ORGANIZED HEALTH CARE EDUCATION/TRAINING PROGRAM

## 2019-12-27 PROCEDURE — 2720000010 HC SURG SUPPLY STERILE: Performed by: PODIATRIST

## 2019-12-27 PROCEDURE — 7100000000 HC PACU RECOVERY - FIRST 15 MIN: Performed by: PODIATRIST

## 2019-12-27 PROCEDURE — E0749 ELEC OSTEOGEN STIM IMPLANTED: HCPCS | Performed by: PODIATRIST

## 2019-12-27 PROCEDURE — 6360000002 HC RX W HCPCS: Performed by: STUDENT IN AN ORGANIZED HEALTH CARE EDUCATION/TRAINING PROGRAM

## 2019-12-27 PROCEDURE — 3600000004 HC SURGERY LEVEL 4 BASE: Performed by: PODIATRIST

## 2019-12-27 PROCEDURE — 6360000002 HC RX W HCPCS: Performed by: NURSE ANESTHETIST, CERTIFIED REGISTERED

## 2019-12-27 PROCEDURE — 2500000003 HC RX 250 WO HCPCS: Performed by: PODIATRIST

## 2019-12-27 PROCEDURE — 7100000001 HC PACU RECOVERY - ADDTL 15 MIN: Performed by: PODIATRIST

## 2019-12-27 PROCEDURE — 73620 X-RAY EXAM OF FOOT: CPT

## 2019-12-27 PROCEDURE — 6370000000 HC RX 637 (ALT 250 FOR IP): Performed by: NURSE ANESTHETIST, CERTIFIED REGISTERED

## 2019-12-27 PROCEDURE — 7100000010 HC PHASE II RECOVERY - FIRST 15 MIN: Performed by: PODIATRIST

## 2019-12-27 PROCEDURE — 3600000014 HC SURGERY LEVEL 4 ADDTL 15MIN: Performed by: PODIATRIST

## 2019-12-27 DEVICE — GRAFT BNE W22XL20MM THK55X10MM BICORT EVANS WDG FOR: Type: IMPLANTABLE DEVICE | Status: FUNCTIONAL

## 2019-12-27 DEVICE — TIM-GRAFT BONE AUGMENT 3ML INJ: Type: IMPLANTABLE DEVICE | Status: FUNCTIONAL

## 2019-12-27 DEVICE — IMPLANTABLE DEVICE: Type: IMPLANTABLE DEVICE | Status: FUNCTIONAL

## 2019-12-27 DEVICE — STAPLE BNE FIX BRDG L20MM LEG L15MM NIT BREAKTHROUGH TAPR: Type: IMPLANTABLE DEVICE | Status: FUNCTIONAL

## 2019-12-27 DEVICE — GRAFT BNE SUB 5ML HCT P IN CORTICOCANCELLOUS CELLULAR BNE: Type: IMPLANTABLE DEVICE | Status: FUNCTIONAL

## 2019-12-27 DEVICE — GRAFT BNE SUB 10CC DEMIN CELLULAR MTRX VIVIGEN: Type: IMPLANTABLE DEVICE | Status: FUNCTIONAL

## 2019-12-27 DEVICE — GRAFT BNE W22XL20MM THK35X8MM BICORT EVANS WDG FOR OSTEOTMY: Type: IMPLANTABLE DEVICE | Status: FUNCTIONAL

## 2019-12-27 RX ORDER — MIDAZOLAM HYDROCHLORIDE 1 MG/ML
INJECTION INTRAMUSCULAR; INTRAVENOUS PRN
Status: DISCONTINUED | OUTPATIENT
Start: 2019-12-27 | End: 2019-12-27 | Stop reason: SDUPTHER

## 2019-12-27 RX ORDER — ONDANSETRON 2 MG/ML
INJECTION INTRAMUSCULAR; INTRAVENOUS PRN
Status: DISCONTINUED | OUTPATIENT
Start: 2019-12-27 | End: 2019-12-27 | Stop reason: SDUPTHER

## 2019-12-27 RX ORDER — DEXAMETHASONE SODIUM PHOSPHATE 4 MG/ML
INJECTION, SOLUTION INTRA-ARTICULAR; INTRALESIONAL; INTRAMUSCULAR; INTRAVENOUS; SOFT TISSUE PRN
Status: DISCONTINUED | OUTPATIENT
Start: 2019-12-27 | End: 2019-12-27 | Stop reason: SDUPTHER

## 2019-12-27 RX ORDER — BUPIVACAINE HYDROCHLORIDE 5 MG/ML
INJECTION, SOLUTION EPIDURAL; INTRACAUDAL PRN
Status: DISCONTINUED | OUTPATIENT
Start: 2019-12-27 | End: 2019-12-27 | Stop reason: ALTCHOICE

## 2019-12-27 RX ORDER — EPHEDRINE SULFATE/0.9% NACL/PF 50 MG/5 ML
SYRINGE (ML) INTRAVENOUS PRN
Status: DISCONTINUED | OUTPATIENT
Start: 2019-12-27 | End: 2019-12-27 | Stop reason: SDUPTHER

## 2019-12-27 RX ORDER — HYDROXYZINE PAMOATE 25 MG/1
25 CAPSULE ORAL 4 TIMES DAILY PRN
Qty: 28 CAPSULE | Refills: 0 | Status: SHIPPED | OUTPATIENT
Start: 2019-12-27 | End: 2020-01-03

## 2019-12-27 RX ORDER — GLYCOPYRROLATE 1 MG/5 ML
SYRINGE (ML) INTRAVENOUS PRN
Status: DISCONTINUED | OUTPATIENT
Start: 2019-12-27 | End: 2019-12-27 | Stop reason: SDUPTHER

## 2019-12-27 RX ORDER — SODIUM CHLORIDE 9 MG/ML
INJECTION, SOLUTION INTRAVENOUS CONTINUOUS
Status: DISCONTINUED | OUTPATIENT
Start: 2019-12-27 | End: 2019-12-27 | Stop reason: HOSPADM

## 2019-12-27 RX ORDER — FENTANYL CITRATE 50 UG/ML
INJECTION, SOLUTION INTRAMUSCULAR; INTRAVENOUS PRN
Status: DISCONTINUED | OUTPATIENT
Start: 2019-12-27 | End: 2019-12-27 | Stop reason: SDUPTHER

## 2019-12-27 RX ORDER — SODIUM CHLORIDE 0.9 % (FLUSH) 0.9 %
10 SYRINGE (ML) INJECTION PRN
Status: DISCONTINUED | OUTPATIENT
Start: 2019-12-27 | End: 2019-12-27 | Stop reason: HOSPADM

## 2019-12-27 RX ORDER — SODIUM CHLORIDE 0.9 % (FLUSH) 0.9 %
10 SYRINGE (ML) INJECTION EVERY 12 HOURS SCHEDULED
Status: DISCONTINUED | OUTPATIENT
Start: 2019-12-27 | End: 2019-12-27 | Stop reason: HOSPADM

## 2019-12-27 RX ORDER — SODIUM CHLORIDE, SODIUM LACTATE, POTASSIUM CHLORIDE, CALCIUM CHLORIDE 600; 310; 30; 20 MG/100ML; MG/100ML; MG/100ML; MG/100ML
INJECTION, SOLUTION INTRAVENOUS CONTINUOUS PRN
Status: DISCONTINUED | OUTPATIENT
Start: 2019-12-27 | End: 2019-12-27 | Stop reason: SDUPTHER

## 2019-12-27 RX ORDER — HYDROMORPHONE HCL 110MG/55ML
PATIENT CONTROLLED ANALGESIA SYRINGE INTRAVENOUS PRN
Status: DISCONTINUED | OUTPATIENT
Start: 2019-12-27 | End: 2019-12-27 | Stop reason: SDUPTHER

## 2019-12-27 RX ORDER — NEOSTIGMINE METHYLSULFATE 1 MG/ML
INJECTION, SOLUTION INTRAVENOUS PRN
Status: DISCONTINUED | OUTPATIENT
Start: 2019-12-27 | End: 2019-12-27 | Stop reason: SDUPTHER

## 2019-12-27 RX ORDER — LIDOCAINE HCL/PF 100 MG/5ML
SYRINGE (ML) INJECTION PRN
Status: DISCONTINUED | OUTPATIENT
Start: 2019-12-27 | End: 2019-12-27 | Stop reason: SDUPTHER

## 2019-12-27 RX ORDER — OXYCODONE HYDROCHLORIDE AND ACETAMINOPHEN 5; 325 MG/1; MG/1
1 TABLET ORAL EVERY 6 HOURS PRN
Qty: 28 TABLET | Refills: 0 | Status: SHIPPED | OUTPATIENT
Start: 2019-12-27 | End: 2020-01-03

## 2019-12-27 RX ORDER — KETAMINE HCL IN NACL, ISO-OSM 100MG/10ML
SYRINGE (ML) INJECTION PRN
Status: DISCONTINUED | OUTPATIENT
Start: 2019-12-27 | End: 2019-12-27 | Stop reason: SDUPTHER

## 2019-12-27 RX ORDER — ROCURONIUM BROMIDE 10 MG/ML
INJECTION, SOLUTION INTRAVENOUS PRN
Status: DISCONTINUED | OUTPATIENT
Start: 2019-12-27 | End: 2019-12-27 | Stop reason: SDUPTHER

## 2019-12-27 RX ORDER — CEFAZOLIN SODIUM 1 G/3ML
INJECTION, POWDER, FOR SOLUTION INTRAMUSCULAR; INTRAVENOUS PRN
Status: DISCONTINUED | OUTPATIENT
Start: 2019-12-27 | End: 2019-12-27 | Stop reason: SDUPTHER

## 2019-12-27 RX ORDER — DOCUSATE SODIUM 100 MG/1
100 CAPSULE, LIQUID FILLED ORAL 2 TIMES DAILY
Status: DISCONTINUED | OUTPATIENT
Start: 2019-12-27 | End: 2019-12-27 | Stop reason: HOSPADM

## 2019-12-27 RX ORDER — SCOLOPAMINE TRANSDERMAL SYSTEM 1 MG/1
PATCH, EXTENDED RELEASE TRANSDERMAL PRN
Status: DISCONTINUED | OUTPATIENT
Start: 2019-12-27 | End: 2019-12-27 | Stop reason: SDUPTHER

## 2019-12-27 RX ORDER — CYCLOBENZAPRINE HCL 5 MG
5 TABLET ORAL 3 TIMES DAILY PRN
Qty: 30 TABLET | Refills: 0 | Status: SHIPPED | OUTPATIENT
Start: 2019-12-27 | End: 2020-01-06

## 2019-12-27 RX ORDER — PROPOFOL 10 MG/ML
INJECTION, EMULSION INTRAVENOUS PRN
Status: DISCONTINUED | OUTPATIENT
Start: 2019-12-27 | End: 2019-12-27 | Stop reason: SDUPTHER

## 2019-12-27 RX ADMIN — Medication 2 G: at 11:50

## 2019-12-27 RX ADMIN — HYDROMORPHONE HYDROCHLORIDE 0.4 MG: 2 INJECTION INTRAMUSCULAR; INTRAVENOUS; SUBCUTANEOUS at 11:17

## 2019-12-27 RX ADMIN — Medication 10 MG: at 10:00

## 2019-12-27 RX ADMIN — HYDROMORPHONE HYDROCHLORIDE 0.4 MG: 2 INJECTION INTRAMUSCULAR; INTRAVENOUS; SUBCUTANEOUS at 12:15

## 2019-12-27 RX ADMIN — FENTANYL CITRATE 50 MCG: 50 INJECTION INTRAMUSCULAR; INTRAVENOUS at 07:42

## 2019-12-27 RX ADMIN — PHENYLEPHRINE HYDROCHLORIDE 100 MCG: 10 INJECTION INTRAVENOUS at 08:23

## 2019-12-27 RX ADMIN — ROCURONIUM BROMIDE 50 MG: 10 INJECTION INTRAVENOUS at 07:42

## 2019-12-27 RX ADMIN — SODIUM CHLORIDE: 9 INJECTION, SOLUTION INTRAVENOUS at 07:12

## 2019-12-27 RX ADMIN — MIDAZOLAM HYDROCHLORIDE 1 MG: 1 INJECTION, SOLUTION INTRAMUSCULAR; INTRAVENOUS at 11:40

## 2019-12-27 RX ADMIN — SODIUM CHLORIDE, POTASSIUM CHLORIDE, SODIUM LACTATE AND CALCIUM CHLORIDE: 600; 310; 30; 20 INJECTION, SOLUTION INTRAVENOUS at 08:20

## 2019-12-27 RX ADMIN — Medication 2 G: at 07:50

## 2019-12-27 RX ADMIN — Medication 10 MG: at 08:59

## 2019-12-27 RX ADMIN — FENTANYL CITRATE 25 MCG: 50 INJECTION INTRAMUSCULAR; INTRAVENOUS at 09:23

## 2019-12-27 RX ADMIN — FENTANYL CITRATE 50 MCG: 50 INJECTION INTRAMUSCULAR; INTRAVENOUS at 07:59

## 2019-12-27 RX ADMIN — Medication 20 MG: at 08:32

## 2019-12-27 RX ADMIN — PHENYLEPHRINE HYDROCHLORIDE 100 MCG: 10 INJECTION INTRAVENOUS at 10:30

## 2019-12-27 RX ADMIN — Medication 10 MG: at 09:10

## 2019-12-27 RX ADMIN — HYDROMORPHONE HYDROCHLORIDE 0.4 MG: 2 INJECTION INTRAMUSCULAR; INTRAVENOUS; SUBCUTANEOUS at 11:10

## 2019-12-27 RX ADMIN — PHENYLEPHRINE HYDROCHLORIDE 100 MCG: 10 INJECTION INTRAVENOUS at 10:21

## 2019-12-27 RX ADMIN — PROPOFOL 160 MG: 10 INJECTION, EMULSION INTRAVENOUS at 07:42

## 2019-12-27 RX ADMIN — PHENYLEPHRINE HYDROCHLORIDE 100 MCG: 10 INJECTION INTRAVENOUS at 08:40

## 2019-12-27 RX ADMIN — MIDAZOLAM HYDROCHLORIDE 1 MG: 1 INJECTION, SOLUTION INTRAMUSCULAR; INTRAVENOUS at 11:45

## 2019-12-27 RX ADMIN — Medication 60 MG: at 07:42

## 2019-12-27 RX ADMIN — Medication 3 MG: at 12:00

## 2019-12-27 RX ADMIN — PROPOFOL 40 MG: 10 INJECTION, EMULSION INTRAVENOUS at 07:50

## 2019-12-27 RX ADMIN — PHENYLEPHRINE HYDROCHLORIDE 100 MCG: 10 INJECTION INTRAVENOUS at 08:36

## 2019-12-27 RX ADMIN — FENTANYL CITRATE 25 MCG: 50 INJECTION INTRAMUSCULAR; INTRAVENOUS at 09:30

## 2019-12-27 RX ADMIN — FENTANYL CITRATE 50 MCG: 50 INJECTION INTRAMUSCULAR; INTRAVENOUS at 10:15

## 2019-12-27 RX ADMIN — Medication 0.4 MG: at 12:00

## 2019-12-27 RX ADMIN — ONDANSETRON HYDROCHLORIDE 4 MG: 4 INJECTION, SOLUTION INTRAMUSCULAR; INTRAVENOUS at 07:50

## 2019-12-27 RX ADMIN — DEXAMETHASONE SODIUM PHOSPHATE 8 MG: 4 INJECTION, SOLUTION INTRAMUSCULAR; INTRAVENOUS at 07:50

## 2019-12-27 RX ADMIN — FENTANYL CITRATE 25 MCG: 50 INJECTION INTRAMUSCULAR; INTRAVENOUS at 09:40

## 2019-12-27 RX ADMIN — ROCURONIUM BROMIDE 20 MG: 10 INJECTION INTRAVENOUS at 09:10

## 2019-12-27 RX ADMIN — SCOPALAMINE 1 PATCH: 1 PATCH, EXTENDED RELEASE TRANSDERMAL at 07:50

## 2019-12-27 RX ADMIN — FENTANYL CITRATE 50 MCG: 50 INJECTION INTRAMUSCULAR; INTRAVENOUS at 10:00

## 2019-12-27 RX ADMIN — HYDROMORPHONE HYDROCHLORIDE 0.4 MG: 2 INJECTION INTRAMUSCULAR; INTRAVENOUS; SUBCUTANEOUS at 11:25

## 2019-12-27 RX ADMIN — CEFAZOLIN 2000 MG: 1 INJECTION, POWDER, FOR SOLUTION INTRAMUSCULAR; INTRAVENOUS; PARENTERAL at 12:05

## 2019-12-27 RX ADMIN — Medication 10 MG: at 08:09

## 2019-12-27 RX ADMIN — FENTANYL CITRATE 25 MCG: 50 INJECTION INTRAMUSCULAR; INTRAVENOUS at 09:35

## 2019-12-27 RX ADMIN — HYDROMORPHONE HYDROCHLORIDE 0.4 MG: 2 INJECTION INTRAMUSCULAR; INTRAVENOUS; SUBCUTANEOUS at 11:35

## 2019-12-27 RX ADMIN — PHENYLEPHRINE HYDROCHLORIDE 100 MCG: 10 INJECTION INTRAVENOUS at 10:35

## 2019-12-27 RX ADMIN — ROCURONIUM BROMIDE 20 MG: 10 INJECTION INTRAVENOUS at 08:14

## 2019-12-27 RX ADMIN — Medication 10 MG: at 11:10

## 2019-12-27 RX ADMIN — PHENYLEPHRINE HYDROCHLORIDE 100 MCG: 10 INJECTION INTRAVENOUS at 12:00

## 2019-12-27 RX ADMIN — ROCURONIUM BROMIDE 10 MG: 10 INJECTION INTRAVENOUS at 08:44

## 2019-12-27 RX ADMIN — MIDAZOLAM HYDROCHLORIDE 2 MG: 1 INJECTION, SOLUTION INTRAMUSCULAR; INTRAVENOUS at 07:33

## 2019-12-27 ASSESSMENT — PULMONARY FUNCTION TESTS
PIF_VALUE: 4
PIF_VALUE: 10
PIF_VALUE: 0
PIF_VALUE: 13
PIF_VALUE: 4
PIF_VALUE: 13
PIF_VALUE: 20
PIF_VALUE: 11
PIF_VALUE: 20
PIF_VALUE: 20
PIF_VALUE: 3
PIF_VALUE: 20
PIF_VALUE: 20
PIF_VALUE: 13
PIF_VALUE: 22
PIF_VALUE: 10
PIF_VALUE: 13
PIF_VALUE: 20
PIF_VALUE: 17
PIF_VALUE: 13
PIF_VALUE: 20
PIF_VALUE: 20
PIF_VALUE: 11
PIF_VALUE: 38
PIF_VALUE: 10
PIF_VALUE: 22
PIF_VALUE: 1
PIF_VALUE: 10
PIF_VALUE: 22
PIF_VALUE: 12
PIF_VALUE: 13
PIF_VALUE: 11
PIF_VALUE: 20
PIF_VALUE: 20
PIF_VALUE: 10
PIF_VALUE: 20
PIF_VALUE: 20
PIF_VALUE: 14
PIF_VALUE: 3
PIF_VALUE: 0
PIF_VALUE: 13
PIF_VALUE: 10
PIF_VALUE: 13
PIF_VALUE: 37
PIF_VALUE: 13
PIF_VALUE: 20
PIF_VALUE: 20
PIF_VALUE: 0
PIF_VALUE: 19
PIF_VALUE: 20
PIF_VALUE: 20
PIF_VALUE: 11
PIF_VALUE: 6
PIF_VALUE: 13
PIF_VALUE: 12
PIF_VALUE: 11
PIF_VALUE: 20
PIF_VALUE: 17
PIF_VALUE: 11
PIF_VALUE: 10
PIF_VALUE: 10
PIF_VALUE: 23
PIF_VALUE: 13
PIF_VALUE: 20
PIF_VALUE: 13
PIF_VALUE: 10
PIF_VALUE: 13
PIF_VALUE: 13
PIF_VALUE: 37
PIF_VALUE: 10
PIF_VALUE: 13
PIF_VALUE: 10
PIF_VALUE: 13
PIF_VALUE: 17
PIF_VALUE: 20
PIF_VALUE: 11
PIF_VALUE: 20
PIF_VALUE: 13
PIF_VALUE: 13
PIF_VALUE: 10
PIF_VALUE: 13
PIF_VALUE: 20
PIF_VALUE: 10
PIF_VALUE: 20
PIF_VALUE: 3
PIF_VALUE: 9
PIF_VALUE: 11
PIF_VALUE: 13
PIF_VALUE: 19
PIF_VALUE: 21
PIF_VALUE: 8
PIF_VALUE: 20
PIF_VALUE: 22
PIF_VALUE: 20
PIF_VALUE: 10
PIF_VALUE: 10
PIF_VALUE: 5
PIF_VALUE: 20
PIF_VALUE: 11
PIF_VALUE: 3
PIF_VALUE: 10
PIF_VALUE: 21
PIF_VALUE: 11
PIF_VALUE: 20
PIF_VALUE: 13
PIF_VALUE: 10
PIF_VALUE: 11
PIF_VALUE: 20
PIF_VALUE: 19
PIF_VALUE: 19
PIF_VALUE: 17
PIF_VALUE: 20
PIF_VALUE: 10
PIF_VALUE: 13
PIF_VALUE: 20
PIF_VALUE: 20
PIF_VALUE: 1
PIF_VALUE: 6
PIF_VALUE: 19
PIF_VALUE: 11
PIF_VALUE: 9
PIF_VALUE: 13
PIF_VALUE: 0
PIF_VALUE: 19
PIF_VALUE: 22
PIF_VALUE: 9
PIF_VALUE: 10
PIF_VALUE: 20
PIF_VALUE: 13
PIF_VALUE: 20
PIF_VALUE: 0
PIF_VALUE: 13
PIF_VALUE: 13
PIF_VALUE: 10
PIF_VALUE: 13
PIF_VALUE: 20
PIF_VALUE: 13
PIF_VALUE: 11
PIF_VALUE: 11
PIF_VALUE: 10
PIF_VALUE: 20
PIF_VALUE: 10
PIF_VALUE: 20
PIF_VALUE: 20
PIF_VALUE: 0
PIF_VALUE: 11
PIF_VALUE: 13
PIF_VALUE: 13
PIF_VALUE: 0
PIF_VALUE: 20
PIF_VALUE: 20
PIF_VALUE: 6
PIF_VALUE: 22
PIF_VALUE: 22
PIF_VALUE: 11
PIF_VALUE: 10
PIF_VALUE: 10
PIF_VALUE: 3
PIF_VALUE: 20
PIF_VALUE: 10
PIF_VALUE: 12
PIF_VALUE: 8
PIF_VALUE: 20
PIF_VALUE: 2
PIF_VALUE: 20
PIF_VALUE: 1
PIF_VALUE: 10
PIF_VALUE: 19
PIF_VALUE: 10
PIF_VALUE: 7
PIF_VALUE: 19
PIF_VALUE: 1
PIF_VALUE: 11
PIF_VALUE: 13
PIF_VALUE: 13
PIF_VALUE: 8
PIF_VALUE: 10
PIF_VALUE: 30
PIF_VALUE: 21
PIF_VALUE: 11
PIF_VALUE: 20
PIF_VALUE: 13
PIF_VALUE: 2
PIF_VALUE: 11
PIF_VALUE: 13
PIF_VALUE: 20
PIF_VALUE: 10
PIF_VALUE: 19
PIF_VALUE: 7
PIF_VALUE: 20
PIF_VALUE: 12
PIF_VALUE: 20
PIF_VALUE: 11
PIF_VALUE: 22
PIF_VALUE: 10
PIF_VALUE: 22
PIF_VALUE: 11
PIF_VALUE: 0
PIF_VALUE: 20
PIF_VALUE: 13
PIF_VALUE: 11
PIF_VALUE: 20
PIF_VALUE: 13
PIF_VALUE: 10
PIF_VALUE: 6
PIF_VALUE: 12
PIF_VALUE: 10
PIF_VALUE: 13
PIF_VALUE: 3
PIF_VALUE: 20
PIF_VALUE: 11
PIF_VALUE: 20
PIF_VALUE: 3
PIF_VALUE: 10
PIF_VALUE: 8
PIF_VALUE: 3
PIF_VALUE: 7
PIF_VALUE: 21
PIF_VALUE: 22
PIF_VALUE: 30
PIF_VALUE: 20
PIF_VALUE: 10
PIF_VALUE: 3
PIF_VALUE: 13
PIF_VALUE: 20
PIF_VALUE: 20
PIF_VALUE: 7
PIF_VALUE: 11
PIF_VALUE: 10
PIF_VALUE: 22
PIF_VALUE: 13
PIF_VALUE: 20
PIF_VALUE: 19
PIF_VALUE: 20
PIF_VALUE: 10
PIF_VALUE: 13
PIF_VALUE: 10
PIF_VALUE: 13
PIF_VALUE: 3
PIF_VALUE: 11
PIF_VALUE: 20
PIF_VALUE: 2
PIF_VALUE: 21
PIF_VALUE: 19
PIF_VALUE: 20
PIF_VALUE: 10
PIF_VALUE: 20
PIF_VALUE: 3
PIF_VALUE: 20
PIF_VALUE: 0
PIF_VALUE: 11
PIF_VALUE: 13
PIF_VALUE: 10
PIF_VALUE: 22
PIF_VALUE: 13
PIF_VALUE: 19
PIF_VALUE: 7
PIF_VALUE: 20
PIF_VALUE: 20
PIF_VALUE: 13
PIF_VALUE: 11
PIF_VALUE: 20
PIF_VALUE: 10

## 2019-12-27 ASSESSMENT — PAIN DESCRIPTION - DESCRIPTORS: DESCRIPTORS: ACHING

## 2019-12-27 ASSESSMENT — PAIN SCALES - GENERAL
PAINLEVEL_OUTOF10: 5
PAINLEVEL_OUTOF10: 0

## 2019-12-27 ASSESSMENT — PAIN - FUNCTIONAL ASSESSMENT: PAIN_FUNCTIONAL_ASSESSMENT: 0-10

## 2020-01-24 ENCOUNTER — HOSPITAL ENCOUNTER (OUTPATIENT)
Dept: GENERAL RADIOLOGY | Age: 65
Discharge: HOME | End: 2020-01-24
Payer: COMMERCIAL

## 2020-01-24 ENCOUNTER — HOSPITAL ENCOUNTER (OUTPATIENT)
Age: 65
Discharge: HOME | End: 2020-01-24
Payer: COMMERCIAL

## 2020-01-24 PROCEDURE — 73560 X-RAY EXAM OF KNEE 1 OR 2: CPT

## 2020-01-24 PROCEDURE — 73590 X-RAY EXAM OF LOWER LEG: CPT

## 2020-01-24 PROCEDURE — 73630 X-RAY EXAM OF FOOT: CPT

## 2020-01-24 PROCEDURE — 73521 X-RAY EXAM HIPS BI 2 VIEWS: CPT

## 2020-02-20 RX ORDER — LISINOPRIL 20 MG/1
TABLET ORAL
Qty: 90 TABLET | Refills: 0 | Status: SHIPPED | OUTPATIENT
Start: 2020-02-20 | End: 2020-03-11

## 2020-02-24 ENCOUNTER — TELEPHONE (OUTPATIENT)
Dept: FAMILY MEDICINE CLINIC | Age: 65
End: 2020-02-24

## 2020-02-24 RX ORDER — PREDNISONE 10 MG/1
TABLET ORAL
Qty: 30 TABLET | Refills: 0 | Status: SHIPPED | OUTPATIENT
Start: 2020-02-24 | End: 2020-03-05

## 2020-03-11 RX ORDER — LISINOPRIL 20 MG/1
TABLET ORAL
Qty: 90 TABLET | Refills: 1 | Status: SHIPPED | OUTPATIENT
Start: 2020-03-11 | End: 2020-09-11

## 2020-03-17 ENCOUNTER — OFFICE VISIT (OUTPATIENT)
Dept: FAMILY MEDICINE CLINIC | Age: 65
End: 2020-03-17
Payer: COMMERCIAL

## 2020-03-17 ENCOUNTER — TELEPHONE (OUTPATIENT)
Dept: FAMILY MEDICINE CLINIC | Age: 65
End: 2020-03-17

## 2020-03-17 VITALS
DIASTOLIC BLOOD PRESSURE: 76 MMHG | TEMPERATURE: 98.2 F | BODY MASS INDEX: 25.52 KG/M2 | HEART RATE: 86 BPM | HEIGHT: 75 IN | SYSTOLIC BLOOD PRESSURE: 140 MMHG | RESPIRATION RATE: 16 BRPM

## 2020-03-17 PROCEDURE — 96372 THER/PROPH/DIAG INJ SC/IM: CPT | Performed by: NURSE PRACTITIONER

## 2020-03-17 PROCEDURE — 99213 OFFICE O/P EST LOW 20 MIN: CPT | Performed by: NURSE PRACTITIONER

## 2020-03-17 RX ORDER — PREDNISONE 10 MG/1
TABLET ORAL
Qty: 30 TABLET | Refills: 0 | Status: SHIPPED | OUTPATIENT
Start: 2020-03-17 | End: 2020-03-27

## 2020-03-17 RX ORDER — CEPHALEXIN 500 MG/1
500 CAPSULE ORAL 4 TIMES DAILY
Qty: 40 CAPSULE | Refills: 0 | Status: SHIPPED | OUTPATIENT
Start: 2020-03-17 | End: 2020-03-27

## 2020-03-17 RX ORDER — METHYLPREDNISOLONE ACETATE 80 MG/ML
160 INJECTION, SUSPENSION INTRA-ARTICULAR; INTRALESIONAL; INTRAMUSCULAR; SOFT TISSUE ONCE
Status: COMPLETED | OUTPATIENT
Start: 2020-03-17 | End: 2020-03-17

## 2020-03-17 RX ORDER — IBUPROFEN 200 MG
600-1000 TABLET ORAL EVERY 6 HOURS PRN
COMMUNITY

## 2020-03-17 RX ADMIN — METHYLPREDNISOLONE ACETATE 160 MG: 80 INJECTION, SUSPENSION INTRA-ARTICULAR; INTRALESIONAL; INTRAMUSCULAR; SOFT TISSUE at 10:27

## 2020-03-17 ASSESSMENT — PATIENT HEALTH QUESTIONNAIRE - PHQ9
SUM OF ALL RESPONSES TO PHQ QUESTIONS 1-9: 0
2. FEELING DOWN, DEPRESSED OR HOPELESS: 0
SUM OF ALL RESPONSES TO PHQ9 QUESTIONS 1 & 2: 0
1. LITTLE INTEREST OR PLEASURE IN DOING THINGS: 0
SUM OF ALL RESPONSES TO PHQ QUESTIONS 1-9: 0

## 2020-03-17 ASSESSMENT — ENCOUNTER SYMPTOMS
GASTROINTESTINAL NEGATIVE: 1
RESPIRATORY NEGATIVE: 1
FACIAL SWELLING: 1
EYES NEGATIVE: 1

## 2020-03-17 NOTE — PROGRESS NOTES
After obtaining consent, and per orders of indio petit, injection of depo medrol given in Right upper quad. gluteus by Lisa Reddy. Patient instructed to remain in clinic for 20 minutes afterwards, and to report any adverse reaction to me immediately.

## 2020-03-17 NOTE — PROGRESS NOTES
[address] [place]ack Alvarez [place] [address]  Dept: [phone]  Dept Fax: [phone]  Loc: [phone]  PROGRESS NOTE      VisitDate: 3/17/2020    Taylor Noriega is a 62 y.o. male who presents today for:     Chief Complaint   Patient presents with    Facial Swelling     x3 days. had a pimple like spot on left side of cheek, once he popped it within a day it started swelling. no pain, but states eyes are starting to water         Subjective:  Complains of worsening facial swelling and tenderness over the past 3 days. Reports that he picked a pimple-like structure on left cheek before swelling ensued. Denies any new exposures. Denies fever, shortness of breath, dysphagia, tongue swelling. Denies any new medications. Believes it may possibly been a spider bite to his left cheek. Denies any fever, chills, headache, dizziness, chest pain, shortness of breath, abdominal pain or edema. Patient is recovering from a foot reconstruction in December 2019. He is partial weightbearing with a postop shoe/boot. History anxiety, gout, hypertension, osteoarthritis, collateral blood circulation disorder. Review of Systems   Constitutional: Negative. HENT: Positive for facial swelling. Eyes: Negative. Respiratory: Negative. Cardiovascular: Negative. Gastrointestinal: Negative. Endocrine: Negative. Genitourinary: Negative. Musculoskeletal: Negative. Neurological: Negative. Hematological: Negative. Psychiatric/Behavioral: Negative.       Past Medical History:   Diagnosis Date    Anxiety     Blood circulation, collateral     Gout     Hypertension     Hypotestosteronism 5/24/2013    Osteoarthritis     PONV (postoperative nausea and vomiting)       Past Surgical History:   Procedure Laterality Date    APPENDECTOMY      GASTROCNEMIUS RECESSION Right 12/27/2019    RIGHT 1ST MET-TARSAL FUSION, GASTROC RECESSION, N-C FUSION, T-N FUSION, STJ FUSION, BMA performed by Magi Gomez DPM at [address] [place] [address] ([facility])      ABDOMINAL    OTHER SURGICAL HISTORY      excision of scrotal wall lesion     Family History   Problem Relation Age of Onset    Diabetes Maternal Grandmother     Heart Disease Maternal Grandmother         PACEMAKER    Cancer Maternal Grandmother     Breast Cancer Maternal Grandmother     Cancer Mother         liver    High Blood Pressure Neg Hx     Stroke Neg Hx      Social History     Tobacco Use    Smoking status: Former Smoker     Packs/day: 0.25     Years: 7.00     Pack years: 1.75     Types: Cigarettes     Last attempt to quit:      Years since quittin.2    Smokeless tobacco: Never Used   Substance Use Topics    Alcohol use: Yes     Comment: occasional      Current Outpatient Medications   Medication Sig Dispense Refill    ibuprofen (ADVIL;MOTRIN) 200 MG tablet Take 200 mg by mouth every 6 hours as needed for Pain      predniSONE (DELTASONE) 10 MG tablet 4 po qd for 3 days, then 3 po qd for 3 days, then 2 po qd for 3 days, then 1 po qd for 3 days 30 tablet 0    cephALEXin (KEFLEX) 500 MG capsule Take 1 capsule by mouth 4 times daily for 10 days 40 capsule 0    lisinopril (PRINIVIL;ZESTRIL) 20 MG tablet take 1 tablet by mouth once daily 90 tablet 1     No current facility-administered medications for this visit.       Allergies   Allergen Reactions    Amoxil [Amoxicillin] Itching    Lactose Diarrhea    Tape [Adhesive Tape] Itching     Health Maintenance   Topic Date Due    HIV screen  1971    DTaP/Tdap/Td vaccine (1 - Tdap) 1975    Shingles Vaccine (1 of 2) 2006    Flu vaccine (1) 2019    Potassium monitoring  2020    Creatinine monitoring  2020    Lipid screen  2023    Colon cancer screen colonoscopy  10/14/2023    Hepatitis C screen  Completed    Hepatitis A vaccine  Aged Out    Hepatitis B vaccine  Aged Out    Hib No orders of the defined types were placed in this encounter. Patient giveneducational materials - see patient instructions. Discussed use, benefit, and side effects of prescribed medications. All patient questions answered. Pt voiced understanding. Reviewed health maintenance. Patient agreedwith treatment plan. Follow up as directed. Cellulitis and angioedema information provided to patient. Instructed to apply cool compresses at least 3 times daily for 20 minutes at a time. Medications as prescribed. Continue medications as prescribed.  Educational information on above diagnosis  provided per AVS.    Electronically signed by LEIA Weaver CNP on 3/17/2020 at 10:37 AM

## 2020-03-17 NOTE — PATIENT INSTRUCTIONS
and safety. Be sure to make and go to all appointments, and call your doctor if you are having problems. It's also a good idea to know your test results and keep a list of the medicines you take. How can you care for yourself at home? · Take your medicines exactly as prescribed. Call your doctor if you think you are having a problem with your medicine. You will get more details on the specific medicines your doctor prescribes. Some medicines used to treat angioedema can make you too sleepy to drive safely. Do not drive if you take medicine that may make you sleepy. · Avoid foods or medicine that may have triggered the swelling. · For comfort:  ? Try taking a cool bath. Or place a cool, wet towel on the swollen area. ? Avoid hot baths and showers. ? Wear loose clothing. · Your doctor may prescribe a shot of epinephrine to carry with you in case you have a severe reaction. Learn how to give yourself the shot and keep it with you at all times. Make sure it has not . When should you call for help? Give an epinephrine shot if:    · You think you are having a severe allergic reaction.    After giving an epinephrine shot call  911, even if you feel better.   Call 911 if:    · You have symptoms of a severe allergic reaction. These may include:  ? Sudden raised, red areas (hives) all over your body. ? Swelling of the throat, mouth, lips, or tongue. ? Trouble breathing. ? Passing out (losing consciousness). Or you may feel very lightheaded or suddenly feel weak, confused, or restless.     · You have been given an epinephrine shot, even if you feel better.    Call your doctor now or seek immediate medical care if:    · You have symptoms of an allergic reaction, such as:  ? A rash or hives (raised, red areas on the skin). ? Itching. ? Swelling. ? Belly pain, nausea, or vomiting.    Watch closely for changes in your health, and be sure to contact your doctor if:    · You do not get better as expected. Where can you learn more? Go to [email or web address] and sign in to your [employer] account. Enter J956 in the Hara box to learn more about \"Angioedema: Care Instructions. \"     If you do not have an account, please click on the \"Sign Up Now\" link. Current as of: October 6, 2019Content Version: 12.4  © 8187-0083 [employer]wise, Incorporated. Care instructions adapted under license by [facility] ([facility]). If you have questions about a medical condition or this instruction, always ask your healthcare professional. [address] any warranty or liability for your use of this information.

## 2020-03-26 ENCOUNTER — TELEPHONE (OUTPATIENT)
Dept: FAMILY MEDICINE CLINIC | Age: 65
End: 2020-03-26

## 2020-03-26 NOTE — TELEPHONE ENCOUNTER
Patient states currently his right knee swelling comes and goes depending how long and often he stands up. When he tries to use his leg, it doesn't seem to support him. He is mostly concerned because when he squeezed or puts pressure on his leg below his calf it makes an indentation. He states he has no numbness or tingling just swelling and pain around his knee. He states he uses his crutches and has to use his other leg to support him. His foot surgery he had done was RIGHT 1ST MET-TARSAL FUSION, GASTROC RECESSION, N-C FUSION, T-N FUSION, STJ FUSION, BMA. He states he still has foot and ankle pain but knows that takes time to heal, he is more worried on why his leg is giving him problems.

## 2020-04-14 ENCOUNTER — OFFICE VISIT (OUTPATIENT)
Dept: PRIMARY CARE CLINIC | Age: 65
End: 2020-04-14
Payer: COMMERCIAL

## 2020-04-14 VITALS
DIASTOLIC BLOOD PRESSURE: 90 MMHG | RESPIRATION RATE: 16 BRPM | HEART RATE: 90 BPM | BODY MASS INDEX: 28.56 KG/M2 | WEIGHT: 228.5 LBS | TEMPERATURE: 98.4 F | SYSTOLIC BLOOD PRESSURE: 150 MMHG

## 2020-04-14 PROCEDURE — 99213 OFFICE O/P EST LOW 20 MIN: CPT | Performed by: FAMILY MEDICINE

## 2020-04-14 NOTE — PROGRESS NOTES
Subjective:      Patient ID: Mars Chi is a 62 y.o. male. HPI:    Chief Complaint   Patient presents with    Edema     foot reconstruction surgery in Dec 2019, noticed \"puffiness\" in groin and abdomen recently, denies excessive eating, 14lb weight gain since last visit     Pt here for edema and swelling to bilateral groin region that started today. \"Feels puffy in the groin and lower abdomen region\". States that he gaining quite a bit of weight. He is up 24 lbs since December. Wt Readings from Last 3 Encounters:   04/14/20 228 lb 8 oz (103.6 kg)   12/27/19 204 lb 3.2 oz (92.6 kg)   12/12/19 204 lb (92.5 kg)     Eating regular diet now but has not been able to exercise since his foot surgery. Total reconstructive surgery by Dr. Kiesha Wynn on 12/27. Denies LE edema and pain. No calf swelling, thigh swelling. He is just concerned that the area in his groin area is fluid retention. Patient Active Problem List   Diagnosis    Osteoarthritis    Essential hypertension    Hyperlipidemia    Hypotestosteronism    Ulcer of other part of foot    Pain in limb    Venous (peripheral) insufficiency    Scrotal skin lesion    Acquired equinus deformity of right foot    Valgus deformity of foot, right    Chronic toe pain, right foot    Posterior tibial tendon dysfunction (PTTD) of right lower extremity     Past Surgical History:   Procedure Laterality Date    APPENDECTOMY      GASTROCNEMIUS RECESSION Right 12/27/2019    RIGHT 1ST MET-TARSAL FUSION, GASTROC RECESSION, N-C FUSION, T-N FUSION, STJ FUSION, BMA performed by Agnes Brady DPM at . [facility]  2004    ABDOMINAL    OTHER SURGICAL HISTORY  2015    excision of scrotal wall lesion     Prior to Admission medications    Medication Sig Start Date End Date Taking?  Authorizing Provider   ibuprofen (ADVIL;MOTRIN) 200 MG tablet Take 200 mg by mouth every 6 hours as needed for Pain   Yes Historical Provider, MD   lisinopril (PRINIVIL;ZESTRIL) 20 MG tablet take 1 tablet by mouth once daily 3/11/20  Yes Latanya Johnson MD         Review of Systems   Constitutional: Negative. HENT: Negative. Respiratory: Negative. Cardiovascular: Negative. Gastrointestinal: Negative. Negative for abdominal pain. Increased swelling in lower abdomen     Genitourinary: Negative for discharge, dysuria, hematuria and testicular pain. Musculoskeletal: Negative. Hematological: Negative for adenopathy. All other systems reviewed and are negative. Objective:   Physical Exam  Vitals signs and nursing note reviewed. Constitutional:       Appearance: He is well-developed. HENT:      Head: Normocephalic and atraumatic. Right Ear: Tympanic membrane normal.      Left Ear: Tympanic membrane normal.   Cardiovascular:      Rate and Rhythm: Normal rate and regular rhythm. Heart sounds: Normal heart sounds. No murmur. Pulmonary:      Effort: Pulmonary effort is normal.      Breath sounds: Normal breath sounds. Abdominal:      General: Bowel sounds are normal.      Palpations: Abdomen is soft. Hernia: No hernia is present. Skin:     General: Skin is warm and dry. Neurological:      Mental Status: He is alert and oriented to person, place, and time. Psychiatric:         Behavior: Behavior normal.         Assessment:       Diagnosis Orders   1.  Weight gain             Plan:      -  Pt reassurance  -  Area of concern in lower abdomen/pubic region is just a fat pad  -  Weight loss encouraged  -  RTO prn        Bryon Flores DO

## 2020-04-15 ASSESSMENT — ENCOUNTER SYMPTOMS
RESPIRATORY NEGATIVE: 1
ABDOMINAL PAIN: 0
GASTROINTESTINAL NEGATIVE: 1

## 2020-04-16 ENCOUNTER — TELEPHONE (OUTPATIENT)
Dept: WOUND CARE | Age: 65
End: 2020-04-16

## 2020-04-16 NOTE — TELEPHONE ENCOUNTER
Pharmacy called regarding prescription for patient. Patient called stating he was seen this week and we were to call in a prescription. Updated pharmacy that patient was not seen at the wound care recently. Voices understanding and states they may have notified the wrong office.

## 2020-05-26 ENCOUNTER — HOSPITAL ENCOUNTER (OUTPATIENT)
Dept: CT IMAGING | Age: 65
Discharge: HOME | End: 2020-05-26
Payer: COMMERCIAL

## 2020-05-26 PROCEDURE — 73700 CT LOWER EXTREMITY W/O DYE: CPT

## 2020-06-16 ENCOUNTER — HOSPITAL ENCOUNTER (OUTPATIENT)
Dept: PHYSICAL THERAPY | Age: 65
Setting detail: THERAPIES SERIES
Discharge: HOME | End: 2020-06-16
Payer: COMMERCIAL

## 2020-06-16 PROCEDURE — 97161 PT EVAL LOW COMPLEX 20 MIN: CPT

## 2020-06-16 PROCEDURE — 97110 THERAPEUTIC EXERCISES: CPT

## 2020-06-16 ASSESSMENT — PAIN SCALES - GENERAL: PAINLEVEL_OUTOF10: 2

## 2020-06-16 ASSESSMENT — PAIN DESCRIPTION - PROGRESSION: CLINICAL_PROGRESSION: GRADUALLY IMPROVING

## 2020-06-16 ASSESSMENT — PAIN DESCRIPTION - DESCRIPTORS: DESCRIPTORS: TIGHTNESS;JABBING

## 2020-06-16 ASSESSMENT — PAIN DESCRIPTION - LOCATION: LOCATION: FOOT;ANKLE

## 2020-06-16 ASSESSMENT — PAIN DESCRIPTION - FREQUENCY: FREQUENCY: INTERMITTENT

## 2020-06-16 ASSESSMENT — PAIN DESCRIPTION - ORIENTATION: ORIENTATION: RIGHT

## 2020-06-16 NOTE — FLOWSHEET NOTE
** PLEASE SIGN, DATE AND TIME CERTIFICATION BELOW AND RETURN TO [facility] OUTPATIENT REHABILITATION (FAX #: [phone]). ATTEST/CO-SIGN IF ACCESSING VIA [employer]. THANK YOU.**    I certify that I have examined the patient below and determined that Physical Medicine and Rehabilitation service is necessary and that I approve the established plan of care for up to 90 days or as specifically noted. Attestation, signature or co-signature of physician indicates approval of certification requirements.    ________________________ ____________ __________  Physician Signature   Date   Time       [address]     Time In: 1115  Time Out: 1210  Minutes: 55  Timed Code Treatment Minutes: 15 Minutes           Tinetti:     Date: 2020  Patient Name: Hair Lyons,  Gender:  male        CSN: [id number]   : 1955  (62 y.o.)  Referral Date : 20     Referring Practitioner: Dr. Fco Chacon      Diagnosis: S/P flat foot reconstruction; fusion   Treatment Diagnosis: difficulty walking; right ankle pain/stiffness; balance and gait dysfunction   Additional Pertinent Hx: gouty arthritis; HTN       General:  PT Visit Information  Onset Date: 20  PT Insurance Information: Primary: BC/BS; Secondary: Medical [place]; Deductible and max out of pocket are met; insurance pays at 100%  Total # of Visits to Date: 1  Plan of Care/Certification Expiration Date: 20  Progress Note Due Date: 20  Progress Note Counter: 1                        See Medical History Questionnaire for information related to allergies and medications. Subjective:  Chart Reviewed: Yes  Patient assessed for rehabilitation services?: Yes  Response To Previous Treatment: Not applicable  Family / Caregiver Present: No  Comments: Follow up on 2020     Subjective: On 2019 he underwent a right ankle reconstruction/fusion.  Back in  BALANCE SCORE        GAIT SECTION Patient stands with therapist, walks across room (+/- aids), fist at usual pace, then at rapid pace. 1.  INDICATION OF GAIT (Immediately after told to \"go\" 1 - No hesitancy   2. STEP LENGTH AND HEIGHT 1 - Step through Right and 1 - Step through Left   3. FOOT CLEARANCE 1 - Left foot clears floor   4. STEP SYMMETRY 0 - Right and left step length not equal   5. STEP CONTINUITY 0 - Stopping or discontinuity between steps   6. PATH 2 - Straight without walking aid   7. TRUNK 2 - No sway, flexion, use of arms or walking aid   8. WALKING TIME 0 - Heels apart   GAIT SCORE    TOTAL SCORE    Risk Indicators:  Less than/equal to 18 = high risk  19-23 Moderate risk  Greater than/equal to 24 = low risk     Moderate risk           Observation/Palpation  Posture: Fair  Observation: Pt maintains forward trunk position with gait      RLE General PROM: Ankle DF/PF; inversion/eversion is limited due to fusion    RLE General AROM: Ankle DF/PF; inversion/eversion is limited due to fusion       Comment: Resisted DF/PF and inversion/eversion tests strong, however he is unable to complete double or single leg heel raises         Other: Tinetti:                        Ambulation 1  Surface: carpet  Device: No Device  Assistance: Independent  Gait Deviations: Slow Louann, Decreased step length, Decreased step height  Distance: throughout the clinic       Stairs  # Steps : 4  Stairs Height: 6\"  Rails: Bilateral  Device: No Device  Comment: prefers to descend the stairs going backwards;             Balance  Posture: Fair  Sitting - Static: Good, +  Sitting - Dynamic: Good, +  Standing - Static: Good, +  Standing - Dynamic: Good, +  Single Leg Stance R Le  Single Leg Stance L Le           Exercises  Exercise 1: Seated heel raises; toe raises: 10x each              Activity Tolerance:  Activity Tolerance: Patient Tolerated treatment well    Assessment:   Body structures, Functions, Activity limitations: Decreased functional mobility , Decreased balance, Decreased ROM, Increased pain, Decreased posture, Decreased strength  Assessment: Nathaly Mckeon is a 62 yr old gentleman presenting to therapy 6 months following a right ankle fusion. He held off on therapy due to increase in pain and edema throughout his right LE. Instead he performed his own exercises and until he felt that he could actively participate in a PT program. Today's evaluation indicated deficits in AROM, strength, balance and gait all consistent with recent right ankle fusion. He will benefit from PT to work to obtain neutral DF to facilitate greater ease with gait while improving dynamic strength and balance so that he may safely return to work and recreational activities without limitation. Prognosis: Good  Discharge Recommendations: Continue to assess pending progress    Patient Education:  PT Education: PT Role, Goals, Home Exercise Program                   Plan:  Times per week: 2x per week   Plan weeks: 6 weeks   Specific instructions for Next Treatment: Review HEP; manual therapy; dynamic balance and gait activities   Current Treatment Recommendations: Strengthening, ROM, Balance Training, Manual Therapy - Joint Manipulation, Manual Therapy - Soft Tissue Mobilization, Neuromuscular Re-education, Patient/Caregiver Education & Training, Pain Management, Home Exercise Program, Modalities    History: Personal factors or comorbidities that impact plan of care -  Moderate Complexity: 1-2 personal factors or comorbidities. See history section above for details. Examination: Body structures and functions, activity limitations, participation restrictions; using standardized tests and measures - Low Complexity: 1-2 body structures and functional, activity limitation and/or participation restrictions. See restrictions and objective section above for details.      Clinical Presentation: Low - Stable and Uncomplicated: presenting with ROM, strength and balance deficits consistent with ankle fusion    Decision Making: Moderate Complexity due to surgery was 6 months ago and he has been off work for almost a year; he has been more dependent upon family for assistance until the last month. Decision making was based on patient assessment and decision making process in determining plan of care and establishing reasonable expectations for measurable functional outcomes. Evaluation Complexity: Based on the findings of patient history, examination, clinical presentation, and decision making during this evaluation, the evaluation of Laurie Joe  is of low complexity. Goals:  Patient goals : Improve gait and balance     Short term goals  Time Frame for Short term goals: 4 weeks  Short term goal 1: LÄNGHEM will complete 10 double leg heel raises without cramping   Short term goal 2: Lewis's Tinetti score will improve to 24/28 indicating mild disability related to balance and gait. Short term goal 3: Subhashs SL balance will improve to 20+ sec on right allowing greater ease with dressing ADLs. Long term goals  Time Frame for Long term goals : 8 weeks   Long term goal 1: Subhashs gait will begin to normalize, demonstrating more of a heel/toe gait, to further minimize stress placed on right knee and hip. Long term goal 2: Subhashs DF ROM will return to neutral so that he may return to ascending/descending stairs with reciprocal gait  Long term goal 3: Discharge with independent HEP  Long term goal 4: LÄNGHEM will be able to walk on compliant and non-compliant surfaces so that he may eventually return to hunting and hiking.      Justen Hughes, PT

## 2020-06-19 ENCOUNTER — HOSPITAL ENCOUNTER (OUTPATIENT)
Dept: PHYSICAL THERAPY | Age: 65
Setting detail: THERAPIES SERIES
Discharge: HOME | End: 2020-06-19
Payer: COMMERCIAL

## 2020-06-19 PROCEDURE — 97110 THERAPEUTIC EXERCISES: CPT

## 2020-06-19 ASSESSMENT — PAIN DESCRIPTION - ORIENTATION: ORIENTATION: RIGHT

## 2020-06-19 ASSESSMENT — PAIN DESCRIPTION - LOCATION: LOCATION: FOOT;ANKLE

## 2020-06-19 ASSESSMENT — PAIN SCALES - GENERAL: PAINLEVEL_OUTOF10: 6

## 2020-06-19 NOTE — PROGRESS NOTES
term goal 4: Sarita Alvarez will be able to walk on compliant and non-compliant surfaces so that he may eventually return to hunting and hiking.      Ashley Prince, [id number]

## 2020-06-23 ENCOUNTER — HOSPITAL ENCOUNTER (OUTPATIENT)
Dept: PHYSICAL THERAPY | Age: 65
Setting detail: THERAPIES SERIES
Discharge: HOME | End: 2020-06-23
Payer: COMMERCIAL

## 2020-06-23 PROCEDURE — 97110 THERAPEUTIC EXERCISES: CPT

## 2020-06-26 ENCOUNTER — HOSPITAL ENCOUNTER (OUTPATIENT)
Dept: PHYSICAL THERAPY | Age: 65
Setting detail: THERAPIES SERIES
Discharge: HOME | End: 2020-06-26
Payer: COMMERCIAL

## 2020-06-26 PROCEDURE — 97110 THERAPEUTIC EXERCISES: CPT

## 2020-06-26 ASSESSMENT — PAIN SCALES - GENERAL: PAINLEVEL_OUTOF10: 5

## 2020-06-26 ASSESSMENT — PAIN DESCRIPTION - LOCATION: LOCATION: FOOT;KNEE

## 2020-06-26 ASSESSMENT — PAIN DESCRIPTION - ORIENTATION: ORIENTATION: RIGHT

## 2020-06-26 ASSESSMENT — PAIN DESCRIPTION - PROGRESSION: CLINICAL_PROGRESSION: GRADUALLY IMPROVING

## 2020-06-30 ENCOUNTER — HOSPITAL ENCOUNTER (OUTPATIENT)
Dept: PHYSICAL THERAPY | Age: 65
Setting detail: THERAPIES SERIES
Discharge: HOME | End: 2020-06-30
Payer: COMMERCIAL

## 2020-06-30 PROCEDURE — 97110 THERAPEUTIC EXERCISES: CPT

## 2020-06-30 ASSESSMENT — PAIN DESCRIPTION - ORIENTATION: ORIENTATION: RIGHT

## 2020-06-30 ASSESSMENT — PAIN DESCRIPTION - LOCATION: LOCATION: FOOT;KNEE;HIP

## 2020-06-30 ASSESSMENT — PAIN SCALES - GENERAL: PAINLEVEL_OUTOF10: 5

## 2020-06-30 NOTE — PROGRESS NOTES
[place]     Time In: 1300  Time Out: 8384  Minutes: 45  Timed Code Treatment Minutes: 45 Minutes                Date: 2020  Patient Name: Solitario eNwberry,  Gender:  male        CSN: [id number]   : 1955  (60 y.o.)  Referral Date : 20    Referring Practitioner: Dr. Enrrique Segal      Diagnosis: S/P flat foot reconstruction; fusion   Treatment Diagnosis: difficulty walking; right ankle pain/stiffness; balance and gait dysfunction    Additional Pertinent Hx: gouty arthritis; HTN                  General:  PT Visit Information  Onset Date: 20  PT Insurance Information: Primary: BC/BS; Secondary: Medical [place]; Deductible and max out of pocket are met; insurance pays at 100%  Total # of Visits to Date: 5  Plan of Care/Certification Expiration Date: 20  Progress Note Counter: 5               Subjective:  Chart Reviewed: Yes  Patient assessed for rehabilitation services?: Yes  Response To Previous Treatment: Not applicable  Family / Caregiver Present: No  Comments: Follow up on 2020     Subjective: Pt stated he is very frustrated since ankle and foot surgery. Pt has pain with DF and R knee flexion. Pain radiates toward R hip. Pain:  Patient Currently in Pain: Yes  Pain Level: 5  Pain Location: Foot, Knee, Hip  Pain Orientation: Right      Objective                 Exercises  Exercise 1: heel/toe raises (limited with R), marching x 10. 3 way hip with Right CC x 10   Exercise 2: standing wooden BAPS board 4 directions x 10   Exercise 6: 6' step ups forward and lateral x 10 with R CC -no hands  Exercise 7: calf stretch off steps  15 seconds x 3   Exercise 8: Ariex: narrow stance on foam for 20 sec., added head turns X10: tandem on foam 45 seconds each   Exercise 9: Rocker Board f/b and s/s x 15; 30 seconds each   Exercise 11: squats x 10   Exercise 12: Nustep seat 15 UE 13 level 3 for 5 min. Activity Tolerance:  Activity Tolerance: Patient limited by pain, Patient Tolerated treatment well    Assessment: Body structures, Functions, Activity limitations: Decreased functional mobility , Decreased balance, Decreased ROM, Increased pain, Decreased posture, Decreased strength  Assessment: Pt continues to work on standing balance and strengthening exercises for R ankle. Pt requires max. cues to stay on task and is very talkative. Pain continues with all weight bearing with R knee flexion starting at ankle then radiates up lateral R knee into hip. Prognosis: Good  Discharge Recommendations: Continue to assess pending progress    Patient Education:  PT Education: PT Role, Goals, Home Exercise Program  Patient Education: cont with HEP, ice for pain relief                      Plan:  Times per week: 2x per week   Plan weeks: 6 weeks   Specific instructions for Next Treatment: Review HEP; manual therapy; dynamic balance and gait activities   Current Treatment Recommendations: Strengthening, ROM, Balance Training, Manual Therapy - Joint Manipulation, Manual Therapy - Soft Tissue Mobilization, Neuromuscular Re-education, Patient/Caregiver Education & Training, Pain Management, Home Exercise Program, Modalities    Goals:  Patient goals : Improve gait and balance     Short term goals  Time Frame for Short term goals: 4 weeks  Short term goal 1: Joy Weiss will complete 10 double leg heel raises without cramping   Short term goal 2: Lewis's Tinetti score will improve to 24/28 indicating mild disability related to balance and gait. Short term goal 3: Lewis's SL balance will improve to 20+ sec on right allowing greater ease with dressing ADLs. Long term goals  Time Frame for Long term goals : 8 weeks   Long term goal 1: Lewis's gait will begin to normalize, demonstrating more of a heel/toe gait, to further minimize stress placed on right knee and hip.    Long term goal 2: Lewis's DF ROM will return to neutral so that he may return to ascending/descending stairs with reciprocal gait  Long term goal 3: Discharge with independent HEP  Long term goal 4: Jose Wagner will be able to walk on compliant and non-compliant surfaces so that he may eventually return to hunting and hiking.      Ismael Clinton, PTA

## 2020-07-02 ENCOUNTER — HOSPITAL ENCOUNTER (OUTPATIENT)
Dept: PHYSICAL THERAPY | Age: 65
Setting detail: THERAPIES SERIES
Discharge: HOME | End: 2020-07-02
Payer: COMMERCIAL

## 2020-07-02 PROCEDURE — 97110 THERAPEUTIC EXERCISES: CPT

## 2020-07-02 ASSESSMENT — PAIN DESCRIPTION - ORIENTATION: ORIENTATION: RIGHT

## 2020-07-02 ASSESSMENT — PAIN SCALES - GENERAL: PAINLEVEL_OUTOF10: 4

## 2020-07-02 ASSESSMENT — PAIN DESCRIPTION - LOCATION: LOCATION: ANKLE;KNEE;HIP

## 2020-07-02 NOTE — PROGRESS NOTES
[place]     Time In: 4114  Time Out: 3227  Minutes: 37  Timed Code Treatment Minutes: 37 Minutes                Date: 2020  Patient Name: Francisco Argueta,  Gender:  male        CSN: [id number]   : 1955  (60 y.o.)  Referral Date : 20    Referring Practitioner: Dr. Pedro Singh      Diagnosis: S/P flat foot reconstruction; fusion   Treatment Diagnosis: difficulty walking; right ankle pain/stiffness; balance and gait dysfunction    Additional Pertinent Hx: gouty arthritis; HTN                  General:  PT Visit Information  Onset Date: 20  PT Insurance Information: Primary: BC/BS; Secondary: Medical [place]; Deductible and max out of pocket are met; insurance pays at 100%  Total # of Visits to Date: 6  Plan of Care/Certification Expiration Date: 20  Progress Note Counter: 6               Subjective:  Family / Caregiver Present: No  Comments: Follow up on 2020     Subjective: Patient reporting pain level 4/10 in ankle and minimal pain up into knee and hip. Pain:  Patient Currently in Pain: Yes  Pain Assessment: 0-10  Pain Level: 4  Pain Location: Ankle, Knee, Hip  Pain Orientation: Right      Objective  Exercises  Exercise 1: heel/toe raises (limited with R), marching x 10. 3 way hip with Right CC x 10   Exercise 2: standing wooden BAPS board 4 directions x 10-15   Exercise 3: Rocker Board (standing) f/b and s/s x 20; 30 seconds balance   Exercise 5: Ariex: narrow stance on foam and tandem stance with right foot back on foam 1 minute each   Exercise 6: 6' step ups forward and lateral x 10 with R CC -no hands  Exercise 7: calf stretch  15 seconds x 3   Exercise 8: Ariex: narrow stance on foam  added head turns X10         Activity Tolerance:  Activity Tolerance: Patient Tolerated treatment well, Patient limited by pain    Assessment:   Body structures, Functions, Activity limitations: Decreased functional mobility , Decreased balance, Decreased ROM, Increased pain, Decreased posture, Decreased strength  Assessment: Patient tolerating session well but still limited due to patient and lack on range of motion with ankle. Patient very motivated with exercises but needing cues to stay on track. Reporting pain level 8/10 at end of session. Prognosis: Good       Patient Education:monitor response to progressions. Plan:  Times per week: 2x per week   Plan weeks: 6 weeks   Specific instructions for Next Treatment: Review HEP; manual therapy; dynamic balance and gait activities   Current Treatment Recommendations: Strengthening, ROM, Balance Training, Manual Therapy - Joint Manipulation, Manual Therapy - Soft Tissue Mobilization, Neuromuscular Re-education, Patient/Caregiver Education & Training, Pain Management, Home Exercise Program, Modalities    Goals:  Patient goals : Improve gait and balance     Short term goals  Time Frame for Short term goals: 4 weeks  Short term goal 1: Jamie Fabry will complete 10 double leg heel raises without cramping   Short term goal 2: Subhashs Tinetti score will improve to 24/28 indicating mild disability related to balance and gait. Short term goal 3: Subhashs SL balance will improve to 20+ sec on right allowing greater ease with dressing ADLs. Long term goals  Time Frame for Long term goals : 8 weeks   Long term goal 1: Subhashs gait will begin to normalize, demonstrating more of a heel/toe gait, to further minimize stress placed on right knee and hip. Long term goal 2: Subhashs DF ROM will return to neutral so that he may return to ascending/descending stairs with reciprocal gait  Long term goal 3: Discharge with independent HEP  Long term goal 4: Jamie Fabry will be able to walk on compliant and non-compliant surfaces so that he may eventually return to hunting and hiking.      Jac Jacome, [id number]

## 2020-07-07 ENCOUNTER — HOSPITAL ENCOUNTER (OUTPATIENT)
Dept: PHYSICAL THERAPY | Age: 65
Setting detail: THERAPIES SERIES
Discharge: HOME | End: 2020-07-07
Payer: COMMERCIAL

## 2020-07-07 PROCEDURE — 97112 NEUROMUSCULAR REEDUCATION: CPT

## 2020-07-07 PROCEDURE — 97110 THERAPEUTIC EXERCISES: CPT

## 2020-07-07 PROCEDURE — 97140 MANUAL THERAPY 1/> REGIONS: CPT

## 2020-07-07 ASSESSMENT — PAIN SCALES - GENERAL: PAINLEVEL_OUTOF10: 4

## 2020-07-07 NOTE — PROGRESS NOTES
[place]     Time In: 4886  Time Out: Anaya Retana 47  Minutes: 41  Timed Code Treatment Minutes: 41 Minutes                Date: 2020  Patient Name: Orestes Ocampo,  Gender:  male        CSN: [id number]   : 1955  (60 y.o.)  Referral Date : 20    Referring Practitioner: Dr. Ivone Sanchez      Diagnosis: S/P flat foot reconstruction; fusion   Treatment Diagnosis: difficulty walking; right ankle pain/stiffness; balance and gait dysfunction    Additional Pertinent Hx: gouty arthritis; HTN                  General:  PT Visit Information  Onset Date: 20  PT Insurance Information: Primary: BC/BS; Secondary: Medical [place]; Deductible and max out of pocket are met; insurance pays at 379%  Plan of Care/Certification Expiration Date: 20  Progress Note Counter: 6               Subjective:  Family / Caregiver Present: No  Comments: Follow up on 2020     Subjective: Toe is sore today; pain is a 4-5/10. The great toe on his right foot has been bothering me.       Pain:  Patient Currently in Pain: Yes  Pain Level: 4      Objective              Exercises  Exercise 1: heel/toe raises (limited with R), marching x 10. 3 way hip with Right CC x 10   Exercise 2: seated wooden BAPS board 4 directions x 10-15   Exercise 3: Rocker Board (standing) f/b and s/s x 20; 30 seconds balance   Exercise 5: Ariex: narrow stance on foam and tandem stance with right foot back on foam 1 minute each   Exercise 6: 6' step ups forward and lateral x 10 with R CC -no hands  Exercise 7: calf stretch off steps  15 seconds x 3   Exercise 11: squats x 10 on airex   Exercise 13: Supine for STM throughout dorsum and plantar surface of right foot; Grade II/III A/P to MT heads: 10 min          Activity Tolerance:  Activity Tolerance: Patient Tolerated treatment well, Patient limited by pain  Activity Tolerance: Patient reported improved mobility and less pain following today's session     Assessment:  Assessment: Due to increase in right great toe pain today's session included STM and segmental mobility to the MT heads. He reported less pain and stiffness at end of session. Prognosis: Good       Patient Education:  PT Education: PT Role, Goals, Home Exercise Program  Patient Education: cont with HEP, ice for pain relief                      Plan:  Times per week: 2x per week   Plan weeks: 6 weeks   Specific instructions for Next Treatment: Review HEP; manual therapy; dynamic balance and gait activities   Current Treatment Recommendations: Strengthening, ROM, Balance Training, Manual Therapy - Joint Manipulation, Manual Therapy - Soft Tissue Mobilization, Neuromuscular Re-education, Patient/Caregiver Education & Training, Pain Management, Home Exercise Program, Modalities    Goals:  Patient goals : Improve gait and balance     Short term goals  Time Frame for Short term goals: 4 weeks  Short term goal 1: Blessing Rodas will complete 10 double leg heel raises without cramping   Short term goal 2: Lewis's Tinetti score will improve to 24/28 indicating mild disability related to balance and gait. Short term goal 3: Subhashs SL balance will improve to 20+ sec on right allowing greater ease with dressing ADLs. Long term goals  Time Frame for Long term goals : 8 weeks   Long term goal 1: Subhashs gait will begin to normalize, demonstrating more of a heel/toe gait, to further minimize stress placed on right knee and hip. Long term goal 2: Subhashs DF ROM will return to neutral so that he may return to ascending/descending stairs with reciprocal gait  Long term goal 3: Discharge with independent HEP  Long term goal 4: Blessing Rodas will be able to walk on compliant and non-compliant surfaces so that he may eventually return to hunting and hiking.      Yudith Parekh, PT

## 2020-07-10 ENCOUNTER — HOSPITAL ENCOUNTER (OUTPATIENT)
Dept: PHYSICAL THERAPY | Age: 65
Setting detail: THERAPIES SERIES
Discharge: HOME | End: 2020-07-10
Payer: COMMERCIAL

## 2020-07-10 PROCEDURE — 97110 THERAPEUTIC EXERCISES: CPT

## 2020-07-10 ASSESSMENT — PAIN SCALES - GENERAL: PAINLEVEL_OUTOF10: 4

## 2020-07-10 NOTE — PROGRESS NOTES
[place]     Time In: 1300  Time Out: 2168  Minutes: 45  Timed Code Treatment Minutes: 45 Minutes     Date: 7/10/2020  Patient Name: Rogers Padilla,  Gender:  male        CSN: [id number]   : 1955  (60 y.o.)  Referral Date : 20    Referring Practitioner: Dr. Marcos Fountain      Diagnosis: S/P flat foot reconstruction; fusion   Treatment Diagnosis: difficulty walking; right ankle pain/stiffness; balance and gait dysfunction    Additional Pertinent Hx: gouty arthritis; HTN       General:  PT Visit Information  Onset Date: 20  PT Insurance Information: Primary: BC/BS; Secondary: Medical [place]; Deductible and max out of pocket are met; insurance pays at 100%  Total # of Visits to Date: 7  Plan of Care/Certification Expiration Date: 20  Progress Note Counter: 7/10             Subjective:  Family / Caregiver Present: No  Comments: Follow up on 2020     Subjective: Patient states he is working on exercises at home. Reports the foot is causing Right knee and hip pain. Pain:  Patient Currently in Pain: Yes  Pain Assessment: 0-10  Pain Level: 4(Right Hip, knee, foot)    Objective    Exercises  Exercise 1: Heel/toe raises (limited with R), marching x15, 3-way hip with Right CC x15.   Squats on airex 10x  Exercise 2: Seated wooden BAPS board 4 directions x 10-15   Exercise 3: RockerBoard (standing) forward/back, side to side x20 each; 30 seconds balance each way  Exercise 5: Airex: narrow stance on foam and tandem stance with right foot back on foam 1 minute each   Exercise 6: Step ups 6\"  forward and lateral x15 each with Right CC - no hands  Exercise 7: Calf stretch off steps 15 seconds x3        Activity Tolerance:  Activity Tolerance: Patient Tolerated treatment well, Patient limited by pain  Activity Tolerance: Patient reported improved mobility and less pain following today's session Assessment:  Assessment: Patient tolerating activities well during session. Patient would verbalize pain and discomfort but frequently tries to \"push through pain. \" Patient rated Adalberto Christina 8-8.5/10 at end of session. Patient talkative throughout session and often does more repetitions than instructed. Prognosis: Good     Patient Education:  PT Education: PT Role, Goals, Home Exercise Program  Patient Education: Continue with HEP, ice for pain relief    Plan:  Times per week: 2x per week   Plan weeks: 6 weeks   Specific instructions for Next Treatment: Review HEP; manual therapy; dynamic balance and gait activities   Current Treatment Recommendations: Strengthening, ROM, Balance Training, Manual Therapy - Joint Manipulation, Manual Therapy - Soft Tissue Mobilization, Neuromuscular Re-education, Patient/Caregiver Education & Training, Pain Management, Home Exercise Program, Modalities  Plan Comment: Continue with current POC    Goals:  Patient goals : Improve gait and balance     Short term goals  Time Frame for Short term goals: 4 weeks  Short term goal 1: Humberto Boyer will complete 10 double leg heel raises without cramping   Short term goal 2: Subhashs Tinetti score will improve to 24/28 indicating mild disability related to balance and gait. Short term goal 3: Subhashs SL balance will improve to 20+ sec on right allowing greater ease with dressing ADLs. Long term goals  Time Frame for Long term goals : 8 weeks   Long term goal 1: Subhashs gait will begin to normalize, demonstrating more of a heel/toe gait, to further minimize stress placed on right knee and hip. Long term goal 2: Angel DF ROM will return to neutral so that he may return to ascending/descending stairs with reciprocal gait  Long term goal 3: Discharge with independent HEP  Long term goal 4: Humberto Boyer will be able to walk on compliant and non-compliant surfaces so that he may eventually return to hunting and hiking. Leeanna Hastings.  Brandi Palmer, PTA [id number], 7/10/2020

## 2020-07-14 ENCOUNTER — HOSPITAL ENCOUNTER (OUTPATIENT)
Dept: PHYSICAL THERAPY | Age: 65
Setting detail: THERAPIES SERIES
Discharge: HOME | End: 2020-07-14
Payer: COMMERCIAL

## 2020-07-14 PROCEDURE — 97530 THERAPEUTIC ACTIVITIES: CPT

## 2020-07-14 PROCEDURE — 97110 THERAPEUTIC EXERCISES: CPT

## 2020-07-14 NOTE — PROGRESS NOTES
[address]     Time In: 1150  Time Out: 3240  Minutes: 25  Timed Code Treatment Minutes: 25 Minutes                Date: 2020  Patient Name: Maurizio Bello,  Gender:  male        CSN: [id number]   : 1955  (60 y.o.)  Referral Date : 20    Referring Practitioner: Dr. Satnam Rai      Diagnosis: S/P flat foot reconstruction; fusion   Treatment Diagnosis: difficulty walking; right ankle pain/stiffness; balance and gait dysfunction    Additional Pertinent Hx: gouty arthritis; HTN                  General:  PT Visit Information  Onset Date: 20  PT Insurance Information: Primary: BC/BS; Secondary: Medical [place]; Deductible and max out of pocket are met; insurance pays at 915%  Plan of Care/Certification Expiration Date: 20  Progress Note Counter: 8/10               Subjective:  Family / Caregiver Present: No  Comments: Follow up on 2020     Subjective: Pt 20 min late to appt-forgot about change in time. He feels that he has made progress since starting therapy. He actually went fishing and he paid for it with having to walk on uneven rocks etc.     Pain:  Patient Currently in Pain: Yes         Objective               Balance  Posture: Fair  Sitting - Static: Good, +  Sitting - Dynamic: Good, +  Standing - Static: Good, +  Standing - Dynamic: Good, +  Single Leg Stance R Le  Single Leg Stance L Le          Exercises  Exercise 1: Heel/toe raises (limited with R), marching x15, 3-way hip with Right CC x15.   Squats on airex 10x  Exercise 2: Seated wooden BAPS board 4 directions x 10-15   Exercise 3: RockerBoard (standing) forward/back, side to side x20 each; 30 seconds balance each way  Exercise 5: Airex: narrow stance on foam and tandem stance with right foot back on foam 1 minute each   Exercise 6: Step ups 6\"  forward and lateral x15 each with Right CC - no hands  Exercise 7: Calf stretch off steps 15 seconds x3   Exercise 8: Review of goals; strength and balance assessment      Balance Score: 15  Gait Score: 9  Tinetti Total Score: 24  Activity Tolerance:  Activity Tolerance: Patient Tolerated treatment well, Patient limited by pain  Activity Tolerance: Patient reported improved mobility and less pain following today's session     Assessment:  Assessment: Today's appointment was limited due to pt being late. He has demonstrated improved balance, strength and gait overall as evidenced by improved Tinetti score. Finn Villagran will continue to benefit from PT to work to improve gait, balance and strength. Prognosis: Good       Patient Education:  PT Education: PT Role, Goals, Home Exercise Program  Patient Education: Continue with HEP, ice for pain relief                      Plan:  Times per week: 2x per week   Plan weeks: 6 weeks   Specific instructions for Next Treatment: Review HEP; manual therapy; dynamic balance and gait activities   Current Treatment Recommendations: Strengthening, ROM, Balance Training, Manual Therapy - Joint Manipulation, Manual Therapy - Soft Tissue Mobilization, Neuromuscular Re-education, Patient/Caregiver Education & Training, Pain Management, Home Exercise Program, Modalities  Plan Comment: Continue with current POC    Goals:  Patient goals : Improve gait and balance     Short term goals  Time Frame for Short term goals: 4 weeks  Short term goal 1: Finn Villagran will complete 10 double leg heel raises without cramping NOT MET: attempted 2  Short term goal 2: Subhashs Tinetti score will improve to 24/28 indicating mild disability related to balance and gait. GOAL MET-24/28  Short term goal 3: Angel SL balance will improve to 20+ sec on right allowing greater ease with dressing ADLs.   NOT MET: able to hold for 5 sec    Long term goals  Time Frame for Long term goals : 8 weeks   Long term goal 1: Angel gait will begin to normalize, demonstrating more of a heel/toe gait, to further minimize stress placed on right knee and hip. ONGOING-it is improving  Long term goal 2: Lewis's DF ROM will return to neutral so that he may return to ascending/descending stairs with reciprocal gait PARTIALLY MET: able to ascend with reciprocal gait, descends going backwards  Long term goal 3: Discharge with independent HEP ONGOING  Long term goal 4: Elvia Arzate will be able to walk on compliant and non-compliant surfaces so that he may eventually return to hunting and hiking.  PARTIALLY MET-he did go fishing and walked on uneven ground and rocks, however he was very hesitant    Amelia Yates, PT

## 2020-07-16 ENCOUNTER — HOSPITAL ENCOUNTER (OUTPATIENT)
Dept: PHYSICAL THERAPY | Age: 65
Setting detail: THERAPIES SERIES
Discharge: HOME | End: 2020-07-16
Payer: COMMERCIAL

## 2020-07-16 PROCEDURE — 97110 THERAPEUTIC EXERCISES: CPT

## 2020-07-16 ASSESSMENT — PAIN SCALES - GENERAL: PAINLEVEL_OUTOF10: 5

## 2020-07-16 ASSESSMENT — PAIN DESCRIPTION - LOCATION: LOCATION: ANKLE

## 2020-07-16 ASSESSMENT — PAIN DESCRIPTION - ORIENTATION: ORIENTATION: RIGHT

## 2020-07-16 NOTE — PROGRESS NOTES
Activity Tolerance:  Activity Tolerance: Patient Tolerated treatment well, Patient limited by pain  Activity Tolerance: Patient reported improved mobility after session    Assessment: Body structures, Functions, Activity limitations: Decreased functional mobility , Decreased balance, Decreased ROM, Increased pain, Decreased posture, Decreased strength  Prognosis: Good       Patient Education:  PT Education: PT Role, Goals, Home Exercise Program  Patient Education: Continue with HEP, ice for pain relief                      Plan:  Times per week: 2x per week   Plan weeks: 6 weeks   Specific instructions for Next Treatment: Review HEP; manual therapy; dynamic balance and gait activities   Current Treatment Recommendations: Strengthening, ROM, Balance Training, Manual Therapy - Joint Manipulation, Manual Therapy - Soft Tissue Mobilization, Neuromuscular Re-education, Patient/Caregiver Education & Training, Pain Management, Home Exercise Program, Modalities    Goals:  Patient goals : Improve gait and balance     Short term goals  Time Frame for Short term goals: 4 weeks  Short term goal 1: Magda Theodore will complete 10 double leg heel raises without cramping NOT MET: attempted 2  Short term goal 2: Subhashs Tinetti score will improve to 24/28 indicating mild disability related to balance and gait. GOAL MET-24/28  Short term goal 3: Angel SL balance will improve to 20+ sec on right allowing greater ease with dressing ADLs. NOT MET: able to hold for 5 sec    Long term goals  Time Frame for Long term goals : 8 weeks   Long term goal 1: Angel gait will begin to normalize, demonstrating more of a heel/toe gait, to further minimize stress placed on right knee and hip.  ONGOING-it is improving  Long term goal 2: Subhashs DF ROM will return to neutral so that he may return to ascending/descending stairs with reciprocal gait PARTIALLY MET: able to ascend with reciprocal gait, descends going backwards  Long term goal 3: Discharge with independent HEP ONGOING  Long term goal 4: Vinayak Anderson will be able to walk on compliant and non-compliant surfaces so that he may eventually return to hunting and hiking.  PARTIALLY MET-he did go fishing and walked on uneven ground and rocks, however he was very hesitant    Ivett Chao  [id number]

## 2020-07-21 ENCOUNTER — HOSPITAL ENCOUNTER (OUTPATIENT)
Dept: PHYSICAL THERAPY | Age: 65
Setting detail: THERAPIES SERIES
Discharge: HOME | End: 2020-07-21
Payer: COMMERCIAL

## 2020-07-21 PROCEDURE — 97110 THERAPEUTIC EXERCISES: CPT

## 2020-07-21 ASSESSMENT — PAIN DESCRIPTION - LOCATION: LOCATION: ANKLE;LEG

## 2020-07-21 ASSESSMENT — PAIN SCALES - GENERAL: PAINLEVEL_OUTOF10: 4

## 2020-07-21 NOTE — PROGRESS NOTES
[place]     Time In: 1430  Time Out: 1510  Minutes: 40  Timed Code Treatment Minutes: 40 Minutes                Date: 2020  Patient Name: Heather Sosa,  Gender:  male        CSN: [id number]   : 1955  (60 y.o.)  Referral Date : 20    Referring Practitioner: Dr. Harish Mendes      Diagnosis: S/P flat foot reconstruction; fusion   Treatment Diagnosis: difficulty walking; right ankle pain/stiffness; balance and gait dysfunction                       General:  PT Visit Information  Onset Date: 20  PT Insurance Information: Primary: BC/BS; Secondary: [employer] [place]; Deductible and max out of pocket are met; insurance pays at 100%  Total # of Visits to Date: 5  Plan of Care/Certification Expiration Date: 20  Progress Note Counter: 9/10               Subjective:  Chart Reviewed: Yes  Family / Caregiver Present: No  Comments: Follow up on 2020     Subjective: Reports working outside and stacking wood     Pain:  Patient Currently in Pain: Yes  Pain Assessment: 0-10  Pain Level: 4  Pain Location: Ankle, Leg      Objective                                                                                                                          Exercises  Exercise 1: Heel/toe raises (limited with R), marching x15, 3-way hip with Right CC x15. Squats on airex 10x  Exercise 2: Seated wooden BAPS board 4 directions x 10-15   Exercise 3: RockerBoard (standing) forward/back, side to side x20 each; 30 seconds balance each way  Exercise 4: weight shifts on airex x 10  Exercise 5: Airex: narrow stance on foam and tandem stance with right foot back on foam 1 minute each   Exercise 6: Step ups 6\"  forward and lateral x15 each with Right CC - no hands  Exercise 7: Calf stretch off steps 15 seconds x3   Exercise 9: Rocker Board f/b and s/s x 15; 30 seconds each   Exercise 12: Nustep seat 15 UE 13 level 3 for 5 min. Activity Tolerance:  Activity Tolerance: Patient Tolerated treatment well    Assessment:  Prognosis: Good       Patient Education:  PT Education: PT Role, Goals, Home Exercise Program  Patient Education: Continue with HEP, ice for pain relief                      Plan:  Times per week: 2x per week   Plan weeks: 6 weeks   Specific instructions for Next Treatment: Review HEP; manual therapy; dynamic balance and gait activities   Current Treatment Recommendations: Strengthening, ROM, Balance Training, Manual Therapy - Joint Manipulation, Manual Therapy - Soft Tissue Mobilization, Neuromuscular Re-education, Patient/Caregiver Education & Training, Pain Management, Home Exercise Program, Modalities    Goals:  Patient goals : Improve gait and balance     Short term goals  Time Frame for Short term goals: 4 weeks  Short term goal 1: Riddhi Law will complete 10 double leg heel raises without cramping NOT MET: attempted 2  Short term goal 2: Subhashs Tinetti score will improve to 24/28 indicating mild disability related to balance and gait. GOAL MET-24/28  Short term goal 3: Subhashs SL balance will improve to 20+ sec on right allowing greater ease with dressing ADLs. NOT MET: able to hold for 5 sec    Long term goals  Time Frame for Long term goals : 8 weeks   Long term goal 1: Subhashs gait will begin to normalize, demonstrating more of a heel/toe gait, to further minimize stress placed on right knee and hip. ONGOING-it is improving  Long term goal 2: Subhashs DF ROM will return to neutral so that he may return to ascending/descending stairs with reciprocal gait PARTIALLY MET: able to ascend with reciprocal gait, descends going backwards  Long term goal 3: Discharge with independent HEP ONGOING  Long term goal 4: Riddhi Law will be able to walk on compliant and non-compliant surfaces so that he may eventually return to hunting and hiking.  PARTIALLY MET-he did go fishing and walked on uneven ground and rocks, however he was very hesitant    Nubia David  [id number]

## 2020-07-23 ENCOUNTER — HOSPITAL ENCOUNTER (OUTPATIENT)
Dept: PHYSICAL THERAPY | Age: 65
Setting detail: THERAPIES SERIES
Discharge: HOME | End: 2020-07-23
Payer: COMMERCIAL

## 2020-07-23 PROCEDURE — 97110 THERAPEUTIC EXERCISES: CPT

## 2020-07-23 ASSESSMENT — PAIN SCALES - GENERAL: PAINLEVEL_OUTOF10: 4

## 2020-07-23 ASSESSMENT — PAIN DESCRIPTION - ORIENTATION: ORIENTATION: RIGHT

## 2020-07-23 ASSESSMENT — PAIN DESCRIPTION - LOCATION: LOCATION: NECK;ANKLE

## 2020-07-23 NOTE — PROGRESS NOTES
session    Assessment: Body structures, Functions, Activity limitations: Decreased functional mobility , Decreased balance, Decreased ROM, Increased pain, Decreased posture, Decreased strength  Prognosis: Good       Patient Education:  PT Education: PT Role, Goals, Home Exercise Program  Patient Education: Continue with HEP, ice for pain relief                      Plan:  Times per week: 2x per week   Plan weeks: 6 weeks   Specific instructions for Next Treatment: Review HEP; manual therapy; dynamic balance and gait activities   Current Treatment Recommendations: Strengthening, ROM, Balance Training, Manual Therapy - Joint Manipulation, Manual Therapy - Soft Tissue Mobilization, Neuromuscular Re-education, Patient/Caregiver Education & Training, Pain Management, Home Exercise Program, Modalities    Goals:  Patient goals : Improve gait and balance     Short term goals  Time Frame for Short term goals: 4 weeks  Short term goal 1: Andrew Ryan will complete 10 double leg heel raises without cramping NOT MET: attempted 2  Short term goal 2: Subhashs Tinetti score will improve to 24/28 indicating mild disability related to balance and gait. GOAL MET-24/28  Short term goal 3: Subhashs SL balance will improve to 20+ sec on right allowing greater ease with dressing ADLs. NOT MET: able to hold for 5 sec    Long term goals  Time Frame for Long term goals : 8 weeks   Long term goal 1: Subhashs gait will begin to normalize, demonstrating more of a heel/toe gait, to further minimize stress placed on right knee and hip.  ONGOING-it is improving  Long term goal 2: Subhashs DF ROM will return to neutral so that he may return to ascending/descending stairs with reciprocal gait PARTIALLY MET: able to ascend with reciprocal gait, descends going backwards  Long term goal 3: Discharge with independent HEP ONGOING  Long term goal 4: Andrew Ryan will be able to walk on compliant and non-compliant surfaces so that he may eventually return to hunting and hiking.  PARTIALLY MET-he did go fishing and walked on uneven ground and rocks, however he was very hesitant    Radames Aase  [id number]

## 2020-07-28 ENCOUNTER — HOSPITAL ENCOUNTER (OUTPATIENT)
Dept: PHYSICAL THERAPY | Age: 65
Setting detail: THERAPIES SERIES
Discharge: HOME | End: 2020-07-28
Payer: COMMERCIAL

## 2020-07-28 PROCEDURE — 97110 THERAPEUTIC EXERCISES: CPT

## 2020-07-28 ASSESSMENT — PAIN SCALES - GENERAL: PAINLEVEL_OUTOF10: 2

## 2020-07-28 ASSESSMENT — PAIN DESCRIPTION - LOCATION: LOCATION: KNEE;ANKLE

## 2020-07-28 ASSESSMENT — PAIN DESCRIPTION - ORIENTATION: ORIENTATION: RIGHT

## 2020-07-28 ASSESSMENT — PAIN DESCRIPTION - PAIN TYPE: TYPE: CHRONIC PAIN

## 2020-07-28 NOTE — PROGRESS NOTES
[place]     Time In: 8663  Time Out: [address]  Minutes: 40  Timed Code Treatment Minutes: 40 Minutes                Date: 2020  Patient Name: Parul Sprague,  Gender:  male        CSN: [id number]   : 1955  (60 y.o.)  Referral Date : 20    Referring Practitioner: Dr. Nicole Beasley      Diagnosis: S/P flat foot reconstruction; fusion   Treatment Diagnosis: difficulty walking; right ankle pain/stiffness; balance and gait dysfunction    Additional Pertinent Hx: gouty arthritis; HTN                  General:  PT Visit Information  Onset Date: 20  PT Insurance Information: Primary: BC/BS; Secondary: Medical [place]; Deductible and max out of pocket are met; insurance pays at 100%  Total # of Visits to Date: 6  Plan of Care/Certification Expiration Date: 20  Progress Note Counter: 10/10               Subjective:  Chart Reviewed: Yes  Patient assessed for rehabilitation services?: Yes  Family / Caregiver Present: No  Comments: Follow up on 2020     Subjective: Pt stated R knee feels good and stated therapy is helping. Pain:  Patient Currently in Pain: Yes  Pain Level: 2  Pain Type: Chronic pain  Pain Location: Knee, Ankle  Pain Orientation: Right      Objective                                                                                                                          Exercises  Exercise 1: Heel/toe raises (limited with R), marching x15, 3-way hip with Right CC x15.   Squats on airex 10x  Exercise 2: Seated wooden BAPS board 4 directions x 10-15   Exercise 3: RockerBoard (standing) forward/back, side to side x20 each; 30 seconds balance each way  Exercise 4: weight shifts on airex x 15  Exercise 5: Airex: narrow stance on foam and tandem stance with right foot back on foam 1 minute each   Exercise 6: Step ups 6\"  forward and lateral x15 each with Right CC - no hands  Exercise 7: Calf stretch off steps 15 seconds x3   Exercise 14: sport bike seat8 level 4 for 5 min. Activity Tolerance:  Activity Tolerance: Patient Tolerated treatment well    Assessment: Body structures, Functions, Activity limitations: Decreased functional mobility , Decreased balance, Decreased ROM, Increased pain, Decreased posture, Decreased strength  Assessment: Pt has a hard time staying on task during sessions. Pt stated R ankle was popping today causing more pain but \"it is helping and I know I need to keep working it\". Started on sportbike this session. Continued with balance activities. Prognosis: Good  Discharge Recommendations: Continue to assess pending progress    Patient Education:  PT Education: PT Role, Goals, Home Exercise Program  Patient Education: Continue with HEP, ice for pain relief, stretches for ankle                      Plan:  Times per week: 2x per week   Plan weeks: 6 weeks   Specific instructions for Next Treatment: Review HEP; manual therapy; dynamic balance and gait activities   Current Treatment Recommendations: Strengthening, ROM, Balance Training, Manual Therapy - Joint Manipulation, Manual Therapy - Soft Tissue Mobilization, Neuromuscular Re-education, Patient/Caregiver Education & Training, Pain Management, Home Exercise Program, Modalities  Plan Comment: Continue with current POC    Goals:  Patient goals : Improve gait and balance     Short term goals  Time Frame for Short term goals: 4 weeks  Short term goal 1: Elvia Arzate will complete 10 double leg heel raises without cramping NOT MET: attempted 2  Short term goal 2: Lewis's Tinetti score will improve to 24/28 indicating mild disability related to balance and gait. GOAL MET-24/28  Short term goal 3: Lewis's SL balance will improve to 20+ sec on right allowing greater ease with dressing ADLs.   NOT MET: able to hold for 5 sec    Long term goals  Time Frame for Long term goals : 8 weeks   Long term goal 1: Lewis's gait will begin to

## 2020-07-30 ENCOUNTER — HOSPITAL ENCOUNTER (OUTPATIENT)
Dept: PHYSICAL THERAPY | Age: 65
Setting detail: THERAPIES SERIES
Discharge: HOME | End: 2020-07-30
Payer: COMMERCIAL

## 2020-07-30 PROCEDURE — 97110 THERAPEUTIC EXERCISES: CPT

## 2020-07-30 ASSESSMENT — PAIN DESCRIPTION - LOCATION: LOCATION: ANKLE

## 2020-07-30 ASSESSMENT — PAIN SCALES - GENERAL: PAINLEVEL_OUTOF10: 4

## 2020-07-30 ASSESSMENT — PAIN DESCRIPTION - ORIENTATION: ORIENTATION: RIGHT

## 2020-07-30 NOTE — PROGRESS NOTES
functional mobility , Decreased balance, Decreased ROM, Increased pain, Decreased posture, Decreased strength  Prognosis: Good       Patient Education:  PT Education: PT Role, Goals, Home Exercise Program  Patient Education: Continue with HEP, ice for pain relief, stretches for ankle                      Plan:  Times per week: 2x per week   Plan weeks: 6 weeks   Specific instructions for Next Treatment: Review HEP; manual therapy; dynamic balance and gait activities   Current Treatment Recommendations: Strengthening, ROM, Balance Training, Manual Therapy - Joint Manipulation, Manual Therapy - Soft Tissue Mobilization, Neuromuscular Re-education, Patient/Caregiver Education & Training, Pain Management, Home Exercise Program, Modalities  Plan Comment: Continue with current POC    Goals:  Patient goals : Improve gait and balance     Short term goals  Time Frame for Short term goals: 4 weeks  Short term goal 1: Blessing Rodas will complete 10 double leg heel raises without cramping NOT MET: attempted 2  Short term goal 2: Subhashs Tinetti score will improve to 24/28 indicating mild disability related to balance and gait. GOAL MET-24/28  Short term goal 3: Subhashs SL balance will improve to 20+ sec on right allowing greater ease with dressing ADLs. NOT MET: able to hold for 5 sec    Long term goals  Time Frame for Long term goals : 8 weeks   Long term goal 1: Subhashs gait will begin to normalize, demonstrating more of a heel/toe gait, to further minimize stress placed on right knee and hip. ONGOING-it is improving  Long term goal 2: Subhashs DF ROM will return to neutral so that he may return to ascending/descending stairs with reciprocal gait PARTIALLY MET: able to ascend with reciprocal gait, descends going backwards  Long term goal 3: Discharge with independent HEP ONGOING  Long term goal 4: Blessing Rodas will be able to walk on compliant and non-compliant surfaces so that he may eventually return to hunting and hiking.  PARTIALLY

## 2020-08-04 ENCOUNTER — HOSPITAL ENCOUNTER (OUTPATIENT)
Dept: PHYSICAL THERAPY | Age: 65
Setting detail: THERAPIES SERIES
Discharge: HOME | End: 2020-08-04
Payer: COMMERCIAL

## 2020-08-04 PROCEDURE — 97110 THERAPEUTIC EXERCISES: CPT

## 2020-08-04 ASSESSMENT — PAIN SCALES - GENERAL: PAINLEVEL_OUTOF10: 4

## 2020-08-04 ASSESSMENT — PAIN DESCRIPTION - LOCATION: LOCATION: ANKLE

## 2020-08-04 ASSESSMENT — PAIN DESCRIPTION - ORIENTATION: ORIENTATION: RIGHT

## 2020-08-04 NOTE — PROGRESS NOTES
[place]     Time In: 4693  Time Out: [address] [facility]  Minutes: 35  Timed Code Treatment Minutes: 35 Minutes                Date: 2020  Patient Name: Wolfgang Meadows,  Gender:  male        CSN: [id number]   : 1955  (60 y.o.)  Referral Date : 20    Referring Practitioner: Dr. Susan Garcia      Diagnosis: S/P flat foot reconstruction; fusion   Treatment Diagnosis: difficulty walking; right ankle pain/stiffness; balance and gait dysfunction    Additional Pertinent Hx: gouty arthritis; HTN                  General:  PT Visit Information  Onset Date: 20  PT Insurance Information: Primary: BC/BS; Secondary: Medical [place]; Deductible and max out of pocket are met; insurance pays at 100%  Total # of Visits to Date: 15  Plan of Care/Certification Expiration Date: 20  Progress Note Counter: 12/10               Subjective:  Family / Caregiver Present: No  Comments: Follow up on 2020     Subjective: Patient reporting pain level 3-4/10 today and reporting no other complains. Pain:  Patient Currently in Pain: Yes  Pain Assessment: 0-10  Pain Level: 4  Pain Location: Ankle  Pain Orientation: Right      Objective  Exercises  Exercise 1: Heel/toe raises (limited with R), marching x15, 3-way hip with Right CC x15. Squats on airex 15x  Exercise 2: lugnes on BOSU x 10 alternating  Exercise 3: RockerBoard (standing) forward/back, side to side x20 each; 30 seconds balance each way  Exercise 5: Airex: narrow stance on foam and tandem stance with right foot back on foam 1 minute each   Exercise 6: Step ups 6\"  forward and lateral x20 each with Right CC - no hands  Exercise 7: Calf stretch off steps 15 seconds x 3  Exercise 8: Hydro stick in step stance (right foot back) s/s and circles Cw/Ccw x 10         Activity Tolerance:  Activity Tolerance: Patient Tolerated treatment well    Assessment:   Body structures, Functions, Activity limitations: Decreased functional mobility , Decreased balance, Decreased ROM, Increased pain, Decreased posture, Decreased strength  Assessment: Added Cliffu lungpatricia with pt tolerating well but having a little difficulty with balance. Prognosis: Good       Patient Education:no new education                         Plan:  Times per week: 2x per week   Plan weeks: 6 weeks   Specific instructions for Next Treatment: Review HEP; manual therapy; dynamic balance and gait activities   Current Treatment Recommendations: Strengthening, ROM, Balance Training, Manual Therapy - Joint Manipulation, Manual Therapy - Soft Tissue Mobilization, Neuromuscular Re-education, Patient/Caregiver Education & Training, Pain Management, Home Exercise Program, Modalities  Plan Comment: Continue with current POC    Goals:  Patient goals : Improve gait and balance     Short term goals  Time Frame for Short term goals: 4 weeks  Short term goal 1: Lolis Pozo will complete 10 double leg heel raises without cramping NOT MET: attempted 2  Short term goal 2: Subhashs Tinetti score will improve to 24/28 indicating mild disability related to balance and gait. GOAL MET-24/28  Short term goal 3: Subhashs SL balance will improve to 20+ sec on right allowing greater ease with dressing ADLs. NOT MET: able to hold for 5 sec    Long term goals  Time Frame for Long term goals : 8 weeks   Long term goal 1: Subhashs gait will begin to normalize, demonstrating more of a heel/toe gait, to further minimize stress placed on right knee and hip.  ONGOING-it is improving  Long term goal 2: Subhashs DF ROM will return to neutral so that he may return to ascending/descending stairs with reciprocal gait PARTIALLY MET: able to ascend with reciprocal gait, descends going backwards  Long term goal 3: Discharge with independent HEP ONGOING  Long term goal 4: Lolis Pozo will be able to walk on compliant and non-compliant surfaces so that he may eventually return to hunting and hiking.  PARTIALLY MET-he did go fishing and walked on uneven ground and rocks, however he was very hesitant    Lincoln Vazquez PTA

## 2020-08-12 ENCOUNTER — HOSPITAL ENCOUNTER (OUTPATIENT)
Dept: PHYSICAL THERAPY | Age: 65
Setting detail: THERAPIES SERIES
Discharge: HOME | End: 2020-08-12
Payer: COMMERCIAL

## 2020-08-12 PROCEDURE — 97110 THERAPEUTIC EXERCISES: CPT

## 2020-08-12 NOTE — PROGRESS NOTES
** PLEASE SIGN, DATE AND TIME CERTIFICATION BELOW AND RETURN TO [facility] OUTPATIENT [facility]HABILITATION (FAX #: [phone]). ATTEST/CO-SIGN IF ACCESSING VIA [employer]. THANK YOU.**    I certify that I have examined the patient below and determined that Physical Medicine and Rehabilitation service is necessary and that I approve the established plan of care for up to 90 days or as specifically noted. Attestation, signature or co-signature of physician indicates approval of certification requirements.    ________________________ ____________ __________  Physician Signature   Date   Time     [address]     Time In: 1548  Time Out: 7540  Minutes: 45  Timed Code Treatment Minutes: 45 Minutes                Date: 2020  Patient Name: Kristopher Ferreira,  Gender:  male        CSN: [id number]   : 1955  (60 y.o.)  Referral Date : 20    Referring Practitioner: Dr. Marylu Diaz      Diagnosis: S/P flat foot reconstruction; fusion   Treatment Diagnosis: difficulty walking; right ankle pain/stiffness; balance and gait dysfunction    Additional Pertinent Hx: gouty arthritis; HTN                  General:  PT Visit Information  Onset Date: 20  PT Insurance Information: Primary: BC/BS; Secondary: Medical [place]; Deductible and max out of pocket are met; insurance pays at 100%  Total # of Visits to Date: 15  Plan of Care/Certification Expiration Date: 20  Progress Note Counter: 13/10               Subjective:  Family / Caregiver Present: No  Comments: Follow up on 2020     Subjective: Patient reproting pain level 3-4/10 today and reporting no other complains. Pain:  Patient Currently in Pain: Yes         Objective:          Moderate ankle inversion     right ankle PF 20, DF 5    Knee flexion 110, ext 0,   Right Hip IR 0, ER 45, flex 110 with moderate increase in pain in the right leg and groin   strength: Hip flex,knee flexion / ext 4/5 with pain with resist   Ankle DF,4- PF 3-          SLS  Right LE   2 sec and moderate pressure on foot and down the lateral leg. Left 30 sec +         Gait step -- step length decreased  Does not clear opposite toe when stepping forward        Unable to walk on toes on the right. Exercises  Exercise 1: Heel/toe raises (limited with R), marching x15, 3-way hip with Right CC x15. Squats on airex 15x  Exercise 2: lugnes on BOSU x 10 alternating  Exercise 3: RockerBoard (standing) forward/back, side to side x20 each; 30 seconds balance each way  Exercise 5: Airex: narrow stance on foam and tandem stance with right foot back on foam 1 minute each   Exercise 6: Step ups 6\"  forward and lateral x20 each with Right CC - no hands  Exercise 7: Calf stretch off steps 15 seconds x 3  Exercise 8: Hydro stick in step stance (right foot back) s/s and circles Cw/Ccw x 10         Activity Tolerance:  Activity Tolerance: Patient Tolerated treatment well  Activity Tolerance: pt pain limited , especially with WB on the left LE. He has made slow progress with PT  does appear to be having some limitations with right hip tightness and weakness along with the stiffness and weakness in the right ankle and foot. He was instructed to contact his family Dr concerning this    Assessment:   Body structures, Functions, Activity limitations: Decreased functional mobility , Decreased balance, Decreased ROM, Increased pain, Decreased posture, Decreased strength  Assessment: needs more aggressve strengthening for the right LE  Prognosis: Good       Patient Education:  Patient Education: practice SLS more often, stretch the right hip                      Plan:  Times per week: 2  Plan weeks: 4  Specific instructions for Next Treatment: cont strengthening, add NK table  Current Treatment Recommendations: Strengthening, ROM, Balance Training, Manual Therapy - Joint Manipulation, Manual

## 2020-08-17 ENCOUNTER — HOSPITAL ENCOUNTER (OUTPATIENT)
Dept: PHYSICAL THERAPY | Age: 65
Setting detail: THERAPIES SERIES
Discharge: HOME | End: 2020-08-17
Payer: COMMERCIAL

## 2020-08-17 PROCEDURE — 97110 THERAPEUTIC EXERCISES: CPT

## 2020-08-17 ASSESSMENT — PAIN DESCRIPTION - ORIENTATION: ORIENTATION: RIGHT

## 2020-08-17 ASSESSMENT — PAIN SCALES - GENERAL: PAINLEVEL_OUTOF10: 6

## 2020-08-17 ASSESSMENT — PAIN DESCRIPTION - LOCATION: LOCATION: ANKLE;LEG

## 2020-08-17 NOTE — PROGRESS NOTES
[place]     Time In: 7493  Time Out: 1331  Minutes: 36  Timed Code Treatment Minutes: 39 Minutes                Date: 2020  Patient Name: Jennye Lesches,  Gender:  male        CSN: [id number]   : 1955  (60 y.o.)  Referral Date : 20    Referring Practitioner: Dr. Karla Grimes      Diagnosis: S/P flat foot reconstruction; fusion   Treatment Diagnosis: difficulty walking; right ankle pain/stiffness; balance and gait dysfunction    Additional Pertinent Hx: gouty arthritis; HTN                  General:  PT Visit Information  Onset Date: 20  PT Insurance Information: Primary: BC/BS; Secondary: Medical [place]; Deductible and max out of pocket are met; insurance pays at 100%  Total # of Visits to Date: 13  Plan of Care/Certification Expiration Date: 20  Progress Note Counter: 1/10 for PN. Subjective:  Family / Caregiver Present: No  Comments: Follow up on 2020     Subjective: Patient reporting pain level 6/10 today in right ankle and right leg. Pain:  Patient Currently in Pain: Yes  Pain Assessment: 0-10  Pain Level: 6  Pain Location: Ankle, Leg  Pain Orientation: Right      Objective                    Exercises  Exercise 1: Heel/toe raises (limited with R), marching x15, 3-way hip with Right CC x15. Squats on airex 15x  Exercise 2: lunges on BOSU x 15 alternating  Exercise 3: Rocker Board (standing) forward/back, side to side x20 each; 30 seconds balance each way  Exercise 4: NK table for R 5# knee flexion/extension x 10  Exercise 5: Leg/Toe Press 80# x 10 each  Exercise 6: Step ups 6\"  forward x20 with Right CC - no hands         Activity Tolerance:  Activity Tolerance: Patient Tolerated treatment well    Assessment:   Body structures, Functions, Activity limitations: Decreased functional mobility , Decreased balance, Decreased ROM, Increased pain, Decreased posture, Decreased strength  Assessment: Added NK table and leg press with patient having good tolerance. Patient still slow moving during session. Patient reporting pain 7/10 at end of session and reporting no other complains. Prognosis: Good       Patient Education:monitor response to progressions NK table/leg press. Plan:  Times per week: 2  Plan weeks: 4  Specific instructions for Next Treatment: cont strengthening, add NK table  Current Treatment Recommendations: Strengthening, ROM, Balance Training, Manual Therapy - Joint Manipulation, Manual Therapy - Soft Tissue Mobilization, Neuromuscular Re-education, Patient/Caregiver Education & Training, Pain Management, Home Exercise Program, Modalities  Plan Comment: Continue with current POC    Goals:  Patient goals : Improve gait and balance     Short term goals  Time Frame for Short term goals: 4 weeks  Short term goal 1: Andrew Ryan will complete 10 double leg heel raises without cramping   NOT MET   10 partial raises,  Short term goal 2: Subhashs Tinetti score will improve to 24/28 indicating mild disability related to balance and gait. GOAL MET-24/28  Short term goal 3: Subhashs SL balance will improve to 20+ sec on right allowing greater ease with dressing ADLs. NOT MET: able to hold for2 sec    Long term goals  Time Frame for Long term goals : 8 weeks   Long term goal 1: Subhashs gait will begin to normalize, demonstrating more of a heel/toe gait, to further minimize stress placed on right knee and hip. ONGOING-it is improving  Long term goal 2: Subhashs DF ROM will return to neutral so that he may return to ascending/descending stairs with reciprocal gait PARTIALLY MET: able to ascend with reciprocal gait, descends going backwards  Long term goal 3: Discharge with independent HEP ONGOING  Long term goal 4: Andrew Ryan will be able to walk on compliant and non-compliant surfaces so that he may eventually return to hunting and hiking.  PARTIALLY MET-he did go fishing and walked on uneven ground and rocks, however he was very hesitant    Lianne Quiroz, [id number]

## 2020-08-20 ENCOUNTER — HOSPITAL ENCOUNTER (OUTPATIENT)
Dept: PHYSICAL THERAPY | Age: 65
Setting detail: THERAPIES SERIES
Discharge: HOME | End: 2020-08-20
Payer: COMMERCIAL

## 2020-08-20 PROCEDURE — 97110 THERAPEUTIC EXERCISES: CPT

## 2020-08-20 ASSESSMENT — PAIN DESCRIPTION - LOCATION: LOCATION: ANKLE;LEG

## 2020-08-20 ASSESSMENT — PAIN SCALES - GENERAL: PAINLEVEL_OUTOF10: 6

## 2020-08-20 NOTE — PROGRESS NOTES
[place]     Time In: 8834  Time Out: [address]  Minutes: 36  Timed Code Treatment Minutes: 36 Minutes                Date: 2020  Patient Name: Juan Haynes,  Gender:  male        CSN: [id number]   : 1955  (60 y.o.)              Diagnosis: S/P flat foot reconstruction; fusion   Treatment Diagnosis: difficulty walking; right ankle pain/stiffness; balance and gait dysfunction    Additional Pertinent Hx: gouty arthritis; HTN                  General:  PT Visit Information  Onset Date: 20  PT Insurance Information: Primary: BC/BS; Secondary: Medical [place]; Deductible and max out of pocket are met; insurance pays at 100%  Total # of Visits to Date: 12  Plan of Care/Certification Expiration Date: 20  Progress Note Counter: 2/10 for PN. Subjective:  Family / Caregiver Present: No  Comments: Follow up on 2020     Subjective: 610 in right leg and knee and that he was doing some work home before coming in today. Pain:  Patient Currently in Pain: Yes  Pain Assessment: 0-10  Pain Level: 6  Pain Location: Ankle, Leg      Objective  Exercises  Exercise 2: lugnes on BOSU x 15 alternating  Exercise 3: RockerBoard (standing) forward/back, side to side x20 each; 30 seconds balance each way  Exercise 4: NK table for R 5# knee flexion/extension x 15  Exercise 5: Leg/Toe Press 100# x 15 each. . 52# right leg/toe press  Exercise 6: Step ups 6\"  forward and lateral x20 each with Right CC - no hands. retro step up on 4' x15  Exercise 7: Calf stretch off steps 15 seconds x 3         Activity Tolerance:  Activity Tolerance: Patient Tolerated treatment well    Assessment:   Body structures, Functions, Activity limitations: Decreased functional mobility , Decreased balance, Decreased ROM, Increased pain, Decreased posture, Decreased strength  Assessment: Patient very talkative during session and does not often count reps. Did add single leg press and heel raise. Patient reporting having more pain at end of session. Prognosis: Good       Patient Education:continue to try and work on heel toe gait pattern. Plan:  Times per week: 2  Plan weeks: 4  Specific instructions for Next Treatment: cont strengthening, add NK table  Current Treatment Recommendations: Strengthening, ROM, Balance Training, Manual Therapy - Joint Manipulation, Manual Therapy - Soft Tissue Mobilization, Neuromuscular Re-education, Patient/Caregiver Education & Training, Pain Management, Home Exercise Program, Modalities  Plan Comment: Continue with current POC    Goals:  Patient goals : Improve gait and balance     Short term goals  Time Frame for Short term goals: 4 weeks  Short term goal 1: Avinash Dixon will complete 10 double leg heel raises without cramping   NOT MET   10 partial raises,  Short term goal 2: Subhashs Tinetti score will improve to 24/28 indicating mild disability related to balance and gait. GOAL MET-24/28  Short term goal 3: Subhashs SL balance will improve to 20+ sec on right allowing greater ease with dressing ADLs. NOT MET: able to hold for2 sec    Long term goals  Time Frame for Long term goals : 8 weeks   Long term goal 1: Subhashs gait will begin to normalize, demonstrating more of a heel/toe gait, to further minimize stress placed on right knee and hip. ONGOING-it is improving  Long term goal 2: Subhashs DF ROM will return to neutral so that he may return to ascending/descending stairs with reciprocal gait PARTIALLY MET: able to ascend with reciprocal gait, descends going backwards  Long term goal 3: Discharge with independent HEP ONGOING  Long term goal 4: Avinash Dixon will be able to walk on compliant and non-compliant surfaces so that he may eventually return to hunting and hiking.  PARTIALLY MET-he did go fishing and walked on uneven ground and rocks, however he was very hesitant    Sandy Mercado, PTA

## 2020-08-24 ENCOUNTER — HOSPITAL ENCOUNTER (OUTPATIENT)
Dept: PHYSICAL THERAPY | Age: 65
Setting detail: THERAPIES SERIES
Discharge: HOME | End: 2020-08-24
Payer: COMMERCIAL

## 2020-08-24 PROCEDURE — 97110 THERAPEUTIC EXERCISES: CPT

## 2020-08-24 ASSESSMENT — PAIN SCALES - GENERAL: PAINLEVEL_OUTOF10: 4

## 2020-08-24 NOTE — PROGRESS NOTES
[place]     Time In: 1400  Time Out: 1440  Minutes: 40  Timed Code Treatment Minutes: 40 Minutes                Date: 2020  Patient Name: Fritz Johnson,  Gender:  male        CSN: [id number]   : 1955  (60 y.o.)  Referral Date : 20    Referring Practitioner: Dr. Vidhya Cool      Diagnosis: S/P flat foot reconstruction; fusion   Treatment Diagnosis: difficulty walking; right ankle pain/stiffness; balance and gait dysfunction                       General:  PT Visit Information  Onset Date: 20  PT Insurance Information: Primary: BC/BS; Secondary: Medical [place]; Deductible and max out of pocket are met; insurance pays at 100%  Total # of Visits to Date: 16  Plan of Care/Certification Expiration Date: 20  Progress Note Counter: 3/10 for PN               Subjective:  Patient assessed for rehabilitation services?: Yes  Family / Caregiver Present: No  Comments: Follow up on 2020     Subjective: 5/10 ankle prior to stretch,     Pain:  Patient Currently in Pain: Yes  Pain Level: 4      Objective                                                                Exercises  Exercise 2: luunges on BOSU x 15 alternating fast  Exercise 3: RockerBoard (standing) forward/back, side to side x20 each; 30 seconds balance each way  Exercise 4: NK table for R 5# knee flexion/extension x 15  Exercise 5: Leg/Toe Press 100# x 15 each. . 52# right leg/toe press  Exercise 6: Step ups 6\"  forward x20 each with Right CC - no hands. retro step up on 4' x15 , partial lower down without ER,  Exercise 7: Calf stretch off steps 15 seconds x 3  Exercise 11: squats x 10 on airex          Activity Tolerance:  Activity Tolerance: Patient Tolerated treatment well    Assessment:   Body structures, Functions, Activity limitations: Decreased functional mobility , Decreased balance, Decreased ROM, Increased pain, Decreased posture, Decreased strength  Assessment: moves very slowly. encouraged faster speed for gait,  Prognosis: Good       Patient Education:  PT Education: PT Role, Goals, Home Exercise Program  Patient Education: walk faster                      Plan:  Specific instructions for Next Treatment: cont strengthening, add NK table  Current Treatment Recommendations: Strengthening, ROM, Balance Training, Manual Therapy - Joint Manipulation, Manual Therapy - Soft Tissue Mobilization, Neuromuscular Re-education, Patient/Caregiver Education & Training, Pain Management, Home Exercise Program, Modalities  Plan Comment: Continue with current POC    Goals:  Patient goals : Improve gait and balance     Short term goals  Time Frame for Short term goals: 4 weeks  Short term goal 1: Eligio Lim will complete 10 double leg heel raises without cramping   NOT MET   10 partial raises,  Short term goal 2: Lewis's Tinetti score will improve to 24/28 indicating mild disability related to balance and gait. GOAL MET-24/28  Short term goal 3: Subhashs SL balance will improve to 20+ sec on right allowing greater ease with dressing ADLs. NOT MET: able to hold for2 sec    Long term goals  Time Frame for Long term goals : 8 weeks   Long term goal 1: Subhashs gait will begin to normalize, demonstrating more of a heel/toe gait, to further minimize stress placed on right knee and hip. ONGOING-it is improving  Long term goal 2: Subhashs DF ROM will return to neutral so that he may return to ascending/descending stairs with reciprocal gait PARTIALLY MET: able to ascend with reciprocal gait, descends going backwards  Long term goal 3: Discharge with independent HEP ONGOING  Long term goal 4: Eligio Lim will be able to walk on compliant and non-compliant surfaces so that he may eventually return to hunting and hiking.  PARTIALLY MET-he did go fishing and walked on uneven ground and rocks, however he was very hesitant    Abiel Hanson, PT

## 2020-08-27 ENCOUNTER — HOSPITAL ENCOUNTER (OUTPATIENT)
Dept: PHYSICAL THERAPY | Age: 65
Setting detail: THERAPIES SERIES
Discharge: HOME | End: 2020-08-27
Payer: COMMERCIAL

## 2020-08-27 PROCEDURE — 97110 THERAPEUTIC EXERCISES: CPT

## 2020-08-27 ASSESSMENT — PAIN DESCRIPTION - LOCATION: LOCATION: ANKLE;LEG

## 2020-08-27 ASSESSMENT — PAIN DESCRIPTION - ORIENTATION: ORIENTATION: RIGHT

## 2020-08-27 ASSESSMENT — PAIN SCALES - GENERAL: PAINLEVEL_OUTOF10: 7

## 2020-08-27 NOTE — PROGRESS NOTES
[place]     Time In: 2176  Time Out: 1435  Minutes: 48  Timed Code Treatment Minutes: 48 Minutes                Date: 2020  Patient Name: Will Mckenzie,  Gender:  male        CSN: [id number]   : 1955  (60 y.o.)  Referral Date : 20    Referring Practitioner: Dr. Dali Vera      Diagnosis: S/P flat foot reconstruction; fusion   Treatment Diagnosis: difficulty walking; right ankle pain/stiffness; balance and gait dysfunction    Additional Pertinent Hx: gouty arthritis; HTN            General:  PT Visit Information  Onset Date: 20  PT Insurance Information: Primary: BC/BS; Secondary: Medical [place]; Deductible and max out of pocket are met; insurance pays at 100%  Total # of Visits to Date: 25  Plan of Care/Certification Expiration Date: 20  Progress Note Counter: 4/10 for PN. Subjective:  Family / Caregiver Present: No  Comments: Follow up on 2020     Subjective: Patient reporting pain 6/10 today but did have more pain earlier today. Pain:  Patient Currently in Pain: Yes  Pain Assessment: 0-10  Pain Level: 7  Pain Location: Ankle, Leg  Pain Orientation: Right      Objective  Exercises  Exercise 2: luunges on BOSU x 15 alternating fast. side lunges on BOSU x 10. step over BOSU x 5  Exercise 3: RockerBoard (standing) forward/back, side to side x20 each; 30 seconds balance each way. Right leg only F/B with UE's for support - cues to try and let motion come from ankle and not knee  Exercise 4: NK table for R 5# knee flexion/extension x 20  Exercise 5: Leg/Toe Press 100# x 20 each. . 52# right leg/toe press x20  Exercise 6: Step ups 6\"  forward x20 each with Right CC - no hands.  retro step up on 4' x20 , partial lower down without ER x15  Exercise 7: Calf stretch off steps 15 seconds x 3  Exercise 8: Hydro Stick with right single leg stance with left toe back for needed balance 4 will return to neutral so that he may return to ascending/descending stairs with reciprocal gait PARTIALLY MET: able to ascend with reciprocal gait, descends going backwards  Long term goal 3: Discharge with independent HEP ONGOING  Long term goal 4: Lear Mean will be able to walk on compliant and non-compliant surfaces so that he may eventually return to hunting and hiking.  PARTIALLY MET-he did go fishing and walked on uneven ground and rocks, however he was very hesitant    Sandy Mercado, PTA

## 2020-08-31 ENCOUNTER — HOSPITAL ENCOUNTER (OUTPATIENT)
Dept: PHYSICAL THERAPY | Age: 65
Setting detail: THERAPIES SERIES
Discharge: HOME | End: 2020-08-31
Payer: COMMERCIAL

## 2020-08-31 PROCEDURE — 97110 THERAPEUTIC EXERCISES: CPT

## 2020-08-31 ASSESSMENT — PAIN SCALES - GENERAL: PAINLEVEL_OUTOF10: 5

## 2020-08-31 ASSESSMENT — PAIN DESCRIPTION - LOCATION: LOCATION: ANKLE;FOOT

## 2020-08-31 NOTE — PROGRESS NOTES
[place]     Time In: 7217  Time Out: 1351  Minutes: 48  Timed Code Treatment Minutes: 48 Minutes                Date: 2020  Patient Name: Lauri Borja,  Gender:  male        CSN: [id number]   : 1955  (60 y.o.)  Referral Date : 20    Referring Practitioner: Dr. Tita Aguilera      Diagnosis: S/P flat foot reconstruction; fusion   Treatment Diagnosis: difficulty walking; right ankle pain/stiffness; balance and gait dysfunction    Additional Pertinent Hx: gouty arthritis; HTN                  General:  PT Visit Information  Onset Date: 20  PT Insurance Information: Primary: BC/BS; Secondary: Medical [place]; Deductible and max out of pocket are met; insurance pays at 100%  Total # of Visits to Date:   Plan of Care/Certification Expiration Date: 20  Progress Note Counter: 5/10 for PN. Subjective:  Family / Caregiver Present: No  Comments: Follow up on 2020     Subjective: Patient reporting pain level 5/10 today     Pain:  Patient Currently in Pain: Yes  Pain Assessment: 0-10  Pain Level: 5  Pain Location: Ankle, Foot      Objective  Exercises  Exercise 2: lunges on BOSU x 15 alternating fast. side lunges on BOSU x 10  Exercise 3: RockerBoard (standing) forward/back, side to side x20 each; 30 seconds balance each way. Right leg only F/B x15 with UE's for support - cues to try and let motion come from ankle and not knee  Exercise 4: NK table for R 5# knee flexion/extension x 20  Exercise 6: Step ups 6\"  forward x20 each with Right CC - no hands. retro step up on 4' x20 , partial lower down without ER x15  Exercise 7: Calf stretch off steps 15 seconds x 3  Exercise 8: Hydro Stick with right single leg stance with left toe back for needed balance 4 directions x 15. lunge stretch with toe on hydro stick platrform 20 seconds x 3.   Exercise 9: tandem stance at rebounder x 15 bilat  Exercise 10: Barrett way: retro walking, working on heel/toe gait pattern, marching  x 1 lap each         Activity Tolerance:  Activity Tolerance: Patient Tolerated treatment well    Assessment: Body structures, Functions, Activity limitations: Decreased functional mobility , Decreased balance, Decreased ROM, Increased pain, Decreased posture, Decreased strength  Assessment: Progressed with dynamic gait with patient having diffiuctly with balance. Patient lacking good heel strike and toe off on right lower extremity and lacking good TKE. Also added tandme stance at rbounder with patinet even having difficutly with getting in tadnem stance posistion. Patient reporting having more pain at end of session. Prognosis: Good       Patient Education:walking slow working on TKE and heel strike and toe off. Plan:  Times per week: 2  Plan weeks: 4  Specific instructions for Next Treatment: cont strengthening, add NK table  Current Treatment Recommendations: Strengthening, ROM, Balance Training, Manual Therapy - Joint Manipulation, Manual Therapy - Soft Tissue Mobilization, Neuromuscular Re-education, Patient/Caregiver Education & Training, Pain Management, Home Exercise Program, Modalities  Plan Comment: Continue with current POC    Goals:  Patient goals : Improve gait and balance     Short term goals  Time Frame for Short term goals: 4 weeks  Short term goal 1: Reed Mai will complete 10 double leg heel raises without cramping   NOT MET   10 partial raises,  Short term goal 2: Lewis's Tinetti score will improve to 24/28 indicating mild disability related to balance and gait. GOAL MET-24/28  Short term goal 3: Lewis's SL balance will improve to 20+ sec on right allowing greater ease with dressing ADLs.   NOT MET: able to hold for2 sec    Long term goals  Time Frame for Long term goals : 8 weeks   Long term goal 1: Lewis's gait will begin to normalize, demonstrating more of a heel/toe gait, to further minimize stress placed on right knee and hip. ONGOING-it is improving  Long term goal 2: Lewis's DF ROM will return to neutral so that he may return to ascending/descending stairs with reciprocal gait PARTIALLY MET: able to ascend with reciprocal gait, descends going backwards  Long term goal 3: Discharge with independent HEP ONGOING  Long term goal 4: Tonio Graves will be able to walk on compliant and non-compliant surfaces so that he may eventually return to hunting and hiking.  PARTIALLY MET-he did go fishing and walked on uneven ground and rocks, however he was very hesitant    Emma Daughters, [id number]

## 2020-09-03 ENCOUNTER — HOSPITAL ENCOUNTER (OUTPATIENT)
Dept: PHYSICAL THERAPY | Age: 65
Setting detail: THERAPIES SERIES
Discharge: HOME | End: 2020-09-03
Payer: COMMERCIAL

## 2020-09-03 PROCEDURE — 97110 THERAPEUTIC EXERCISES: CPT

## 2020-09-03 ASSESSMENT — PAIN SCALES - GENERAL: PAINLEVEL_OUTOF10: 6

## 2020-09-03 ASSESSMENT — PAIN DESCRIPTION - LOCATION: LOCATION: ANKLE;FOOT;KNEE;LEG

## 2020-09-03 ASSESSMENT — PAIN DESCRIPTION - PAIN TYPE: TYPE: CHRONIC PAIN

## 2020-09-03 NOTE — PROGRESS NOTES
[place]     Time In: 0639  Time Out: [address]45[address] [address]  Minutes: 51  Timed Code Treatment Minutes: 46 Minutes                Date: 9/3/2020  Patient Name: Orestes Ocampo,  Gender:  male        CSN: [id number]   : 1955  (60 y.o.)  Referral Date : 20    Referring Practitioner: Dr. Ivone Sanchez      Diagnosis: S/P flat foot reconstruction; fusion   Treatment Diagnosis: difficulty walking; right ankle pain/stiffness; balance and gait dysfunction    Additional Pertinent Hx: gouty arthritis; HTN                  General:  PT Visit Information  Onset Date: 20  PT Insurance Information: Primary: BC/BS; Secondary: Medical [place]; Deductible and max out of pocket are met; insurance pays at 100%  Total # of Visits to Date: 21  Plan of Care/Certification Expiration Date: 20  Progress Note Counter: 6/10 for PN. Subjective:  Family / Caregiver Present: No  Comments: Follow up on 2020     Subjective: Patient reporting pain level 6/10 today and reporting that he just got out of bed. Pain:  Patient Currently in Pain: Yes  Pain Assessment: 0-10  Pain Level: 6  Pain Type: Chronic pain  Pain Location: Ankle, Foot, Knee, Leg      Objective           Exercises  Exercise 1: Standing BAPS f/b and s/s x 10 left foot up off floor  Exercise 2: lunges on BOSU x 15 alternating fast. side lunges on BOSU x 15  Exercise 3: RockerBoard (standing) forward/back, side to side x25 each; 30 seconds balance each way. Right leg only F/B x15 with UE's for support - cues to try and let motion come from ankle and not knee  Exercise 4: NK table for R 7.5# knee flexion/extension x 10  Exercise 5: Leg/Toe Press 120# x 15 each. . 52# right leg/toe press x20  Exercise 6: Step ups 6\"  forward x20 each with Right CC - no hands.  retro step up on 6' x20 , partial lower down without ER x10  Exercise 7: Calf stretch off steps 15 seconds x to neutral so that he may return to ascending/descending stairs with reciprocal gait PARTIALLY MET: able to ascend with reciprocal gait, descends going backwards  Long term goal 3: Discharge with independent HEP ONGOING  Long term goal 4: Finn Villagran will be able to walk on compliant and non-compliant surfaces so that he may eventually return to hunting and hiking.  PARTIALLY MET-he did go fishing and walked on uneven ground and rocks, however he was very hesitant    Rekha Arrington, [id number]

## 2020-09-10 ENCOUNTER — HOSPITAL ENCOUNTER (OUTPATIENT)
Dept: PHYSICAL THERAPY | Age: 65
Setting detail: THERAPIES SERIES
Discharge: HOME | End: 2020-09-10
Payer: COMMERCIAL

## 2020-09-10 PROCEDURE — 97116 GAIT TRAINING THERAPY: CPT

## 2020-09-10 PROCEDURE — 97110 THERAPEUTIC EXERCISES: CPT

## 2020-09-10 ASSESSMENT — PAIN SCALES - GENERAL: PAINLEVEL_OUTOF10: 6

## 2020-09-10 NOTE — FLOWSHEET NOTE
** PLEASE SIGN, DATE AND TIME CERTIFICATION BELOW AND RETURN TO [facility] OUTPATIENT [facility]HABILITATION (FAX #: [phone]). ATTEST/CO-SIGN IF ACCESSING VIA [employer]. THANK YOU.**    I certify that I have examined the patient below and determined that Physical Medicine and Rehabilitation service is necessary and that I approve the established plan of care for up to 90 days or as specifically noted. Attestation, signature or co-signature of physician indicates approval of certification requirements.    ________________________ ____________ __________  Physician Signature   Date   Time     [place]     Time In:   Time Out: 2521  Minutes: 44  Timed Code Treatment Minutes: 40 Minutes                Date: 9/10/2020  Patient Name: Marya Robin,  Gender:  male        CSN: [id number]   : 1955  (60 y.o.)  Referral Date : 20    Referring Practitioner: Dr. Samayoa Hem      Diagnosis: S/P flat foot reconstruction; fusion    Additional Pertinent Hx: gouty arthritis; HTN                  General:  PT Visit Information  Onset Date: 20  PT Insurance Information: Primary: BC/BS; Secondary: Medical [place]; Deductible and max out of pocket are met; insurance pays at 100%  Total # of Visits to Date: 24  Plan of Care/Certification Expiration Date: 10/10/20  Progress Note Counter: 6/10 for PN. Subjective:  Family / Caregiver Present: No  Comments: 10-     Subjective: Patient reproting pain level 6/10     Pain:  Patient Currently in Pain: Yes  Pain Level: 6    right knee,  and at the ankle joint. Objective             gait-- slow pace and decreased push off the right.       Ankle ROM  DF 12, PF 40 with end range pain   Strength:  Unable to do a single leg toe raise on the right,                 Exercises  Exercise 2: lunges on BOSU x 15 alternating fast. side lunges on BOSU x 15  Exercise 3: RockerBoard (standing) forward/back, side to side x25 each; 30 seconds balance each way. Right leg only F/B x15 with UE's for support - cues to try and let motion come from ankle and not knee  Exercise 4: NK table for R 7.5# knee flexion/extension x 15  Exercise 5: Leg/Toe Press 132# x 20 each. . 52# right leg/toe press x20  Exercise 6: Step ups 4\"  forward x20 each with Right CC - no hands. retro step up on 6' x20 , partial lower down without ER x10  Exercise 7: Calf stretch off steps 15 seconds x 3         Activity Tolerance:  Activity Tolerance: Patient Tolerated treatment well  Activity Tolerance: pt pain limited , especially with WB on the left LE. He has made slow progress with PT does appear to be having some limitations with right hip tightness and weakness along with the stiffness and weakness in the right ankle and foot. He was instructed to contact his family Dr concerning this    Assessment:   slow progress with PT, c/o moderate pain throughout the right LE and back which is limiting his progress with PT. Patient Education:     Stretch frequently. Work on toe raises more frequently. Walk faster                      Plan:  Times per week: 2  Plan weeks: 4  Specific instructions for Next Treatment: cont strengthening, add NK table, remove shoe for  Current Treatment Recommendations: Strengthening, ROM, Balance Training, Manual Therapy - Joint Manipulation, Manual Therapy - Soft Tissue Mobilization, Neuromuscular Re-education, Patient/Caregiver Education & Training, Pain Management, Home Exercise Program, Modalities    Goals:  Patient goals : Improve gait and balance     Short term goals  Time Frame for Short term goals: 4 weeks  Short term goal 1: Yonathan Lou will complete 10 double leg heel raises without cramping   MET  Short term goal 2: Lewis's Tinetti score will improve to 24/28 indicating mild disability related to balance and gait.  GOAL MET-24/28  Short term goal 3: Lewis's SL balance will improve to 20+ sec on right allowing greater ease with dressing ADLs. NOT MET: able to hold for3 sec    Long term goals  Time Frame for Long term goals : 8 weeks will cont for 4 more weeks  Long term goal 1: Lewis's gait will begin to normalize, demonstrating more of a heel/toe gait, to further minimize stress placed on right knee and hip. ONGOING-it is improving  Long term goal 2: Lewis's DF ROM will return to neutral so that he may return to ascending/descending stairs with reciprocal gait PARTIALLY MET: able to ascend with reciprocal gait, descends going backwards  Long term goal 3: Discharge with independent HEP ONGOING  Long term goal 4: Nel Hale will be able to walk on compliant and non-compliant surfaces so that he may eventually return to hunting and hiking.  PARTIALLY MET-he did go fishing and walked on uneven ground and rocks, however he was very hesitant    Ubaldo Larsen, PT

## 2020-09-11 RX ORDER — LISINOPRIL 20 MG/1
TABLET ORAL
Qty: 90 TABLET | Refills: 0 | Status: SHIPPED | OUTPATIENT
Start: 2020-09-11 | End: 2020-12-14 | Stop reason: SDUPTHER

## 2020-09-17 ENCOUNTER — HOSPITAL ENCOUNTER (OUTPATIENT)
Dept: PHYSICAL THERAPY | Age: 65
Setting detail: THERAPIES SERIES
Discharge: HOME | End: 2020-09-17
Payer: COMMERCIAL

## 2020-09-17 PROCEDURE — 97110 THERAPEUTIC EXERCISES: CPT

## 2020-09-17 ASSESSMENT — PAIN DESCRIPTION - PAIN TYPE: TYPE: CHRONIC PAIN

## 2020-09-17 ASSESSMENT — PAIN DESCRIPTION - LOCATION: LOCATION: ANKLE;KNEE

## 2020-09-17 ASSESSMENT — PAIN DESCRIPTION - ORIENTATION: ORIENTATION: RIGHT

## 2020-09-17 ASSESSMENT — PAIN SCALES - GENERAL: PAINLEVEL_OUTOF10: 6

## 2020-09-17 NOTE — PROGRESS NOTES
[place]     Time In: 1132  Time Out: 3078  Minutes: 43  Timed Code Treatment Minutes: 37 Minutes                Date: 2020  Patient Name: Jennye Lesches,  Gender:  male        CSN: [id number]   : 1955  (60 y.o.)  Referral Date : 20    Referring Practitioner: Dr. Karla Grimes      Diagnosis: S/P flat foot reconstruction; fusion   Treatment Diagnosis: difficulty walking; right ankle pain/stiffness; balance and gait dysfunction    Additional Pertinent Hx: gouty arthritis; HTN                  General:  PT Visit Information  Onset Date: 20  PT Insurance Information: Primary: BC/BS; Secondary: Medical [place]; Deductible and max out of pocket are met; insurance pays at 100%  Total # of Visits to Date:   Plan of Care/Certification Expiration Date: 10/10/20  Progress Note Counter: 7/10 for PN. Subjective:  Chart Reviewed: Yes  Patient assessed for rehabilitation services?: Yes  Response To Previous Treatment: Not applicable  Comments: 86-     Subjective: Pt stated he feels like he is getting stronger. Pt stated he cannot go up on tip toes on R foot only. Pain:  Patient Currently in Pain: Yes  Pain Level: 6  Pain Type: Chronic pain  Pain Location: Ankle, Knee  Pain Orientation: Right      Objective                                                                                                                          Exercises  Exercise 2: lunges on BOSU x 15 alternating fast. side lunges on BOSU x 15  Exercise 3: RockerBoard (standing) forward/back, side to side x25 each; 30 seconds balance each way. Right leg only F/B x15 with UE's for support - cues to try and let motion come from ankle and not knee  Exercise 4: NK table for R 7.5# knee flexion/extension x 15  Exercise 5: Leg/Toe Press 132# x 20 each. . 60# right leg/toe press x15  Exercise 6: Step ups 6\"  forward x20 each with Right CC - no hands. retro step up on 6' x20 , partial lower down without ER x15  Exercise 7: Calf stretch off steps 15 seconds x 3         Activity Tolerance:  Activity Tolerance: Patient Tolerated treatment well    Assessment: Body structures, Functions, Activity limitations: Decreased functional mobility , Decreased balance, Decreased ROM, Increased pain, Decreased posture, Decreased strength  Assessment: Pt continues to progress slowly with weights on Nautilus equipment. Increased height on steps with good tolerance. Pain increased slightly in R knee/ankle at end of session but no number given. Pt requires cues to stay on task  Prognosis: Good  Discharge Recommendations: Continue to assess pending progress    Patient Education:  PT Education: PT Role, Goals, Home Exercise Program  Patient Education: move faster with exercises, cont with stretches                      Plan:  Times per week: 2  Plan weeks: 4  Specific instructions for Next Treatment: cont strengthening, add NK table, remove shoe for  Current Treatment Recommendations: Strengthening, ROM, Balance Training, Manual Therapy - Joint Manipulation, Manual Therapy - Soft Tissue Mobilization, Neuromuscular Re-education, Patient/Caregiver Education & Training, Pain Management, Home Exercise Program, Modalities  Plan Comment: Continue with current POC    Goals:  Patient goals : Improve gait and balance     Short term goals  Time Frame for Short term goals: 4 weeks  Short term goal 1: Riddhi Law will complete 10 double leg heel raises without cramping   MET  Short term goal 2: Lewis's Tinetti score will improve to 24/28 indicating mild disability related to balance and gait. GOAL MET-24/28  Short term goal 3: Lewis's SL balance will improve to 20+ sec on right allowing greater ease with dressing ADLs.   NOT MET: able to hold for3 sec    Long term goals  Time Frame for Long term goals : 8 weeks will cont for 4 more weeks  Long term goal 1: Lewis's gait will begin to normalize, demonstrating more of a heel/toe gait, to further minimize stress placed on right knee and hip. ONGOING-it is improving  Long term goal 2: Lewis's DF ROM will return to neutral so that he may return to ascending/descending stairs with reciprocal gait PARTIALLY MET: able to ascend with reciprocal gait, descends going backwards  Long term goal 3: Discharge with independent HEP ONGOING  Long term goal 4: Adonay Reynolds will be able to walk on compliant and non-compliant surfaces so that he may eventually return to hunting and hiking.  PARTIALLY MET-he did go fishing and walked on uneven ground and rocks, however he was very hesitant    Jesus Wall, PTA

## 2020-09-22 ENCOUNTER — HOSPITAL ENCOUNTER (OUTPATIENT)
Dept: PHYSICAL THERAPY | Age: 65
Setting detail: THERAPIES SERIES
Discharge: HOME | End: 2020-09-22
Payer: COMMERCIAL

## 2020-09-22 PROCEDURE — 97110 THERAPEUTIC EXERCISES: CPT

## 2020-09-22 ASSESSMENT — PAIN DESCRIPTION - LOCATION: LOCATION: ANKLE;LEG

## 2020-09-22 ASSESSMENT — PAIN SCALES - GENERAL: PAINLEVEL_OUTOF10: 4

## 2020-09-22 NOTE — PROGRESS NOTES
[place]     Time In: 4937  Time Out: 9714  Minutes: 36  Timed Code Treatment Minutes: 39 Minutes                Date: 2020  Patient Name: Nisa Casillas,  Gender:  male        CSN: [id number]   : 1955  (60 y.o.)  Referral Date : 20    Referring Practitioner: Dr. Dana Grimaldo      Diagnosis: S/P flat foot reconstruction; fusion    Additional Pertinent Hx: gouty arthritis; HTN                  General:  PT Visit Information  Onset Date: 20  PT Insurance Information: Primary: BC/BS; Secondary: Medical [place]; Deductible and max out of pocket are met; insurance pays at 100%  Total # of Visits to Date: 21  Plan of Care/Certification Expiration Date: 10/10/20  Progress Note Counter: 8/10 for PN               Subjective:  Family / Caregiver Present: No  Comments: 10-     Subjective: Patient reporting pain level is 4/10 today and reporting no other  complains. Does feel like he is getting better. Pain:  Patient Currently in Pain: Yes  Pain Assessment: 0-10  Pain Level: 4  Pain Location: Ankle, Leg      Objective  Exercises  Exercise 3: Rocker Board (standing) forward/back, side to side x25 each; 30 seconds balance each way  Exercise 4: NK table for R 7.5# knee flexion/extension x 20  Exercise 5: Leg/Toe Press 132# x 20 each. . 60# right leg/ press x8  single leg press 40#. single toe press 40# x 20  Exercise 6: Step ups 6\"  forward x20 each with Right CC - no hands. retro step up on 6' x20 , partial lower down without ER x20  Exercise 7: Calf stretch off steps 15 seconds x 3  Exercise 8: Treadmill 3 minutes walking working on heel strike and toe off. Speed 0.7 started with both hands, one hand then had pt walk with no UE support and needing more cues then for correct technique with gait. Exercise 9: 4 way ankle with orange band.  x 15-20 reps patient needing cues to avoid rotating leg with IR/EV Activity Tolerance:  Activity Tolerance: Patient Tolerated treatment well    Assessment:  Assessment: Initiated gait on treadmill to better work on toe off with patient able to do with slow pace. Also added 4 way ankle with orange band with patient tolerating well. Patient reporting higher pain level at end of session 9/10 in ankle but reporting no other complains. Prognosis: Good       Patient Education:gave orange and green band for pt to perform 4 way ankle at home. Did also give pt hand out for 4 way ankle. Plan:  Times per week: 2  Plan weeks: 4  Specific instructions for Next Treatment: cont strengthening, add NK table, remove shoe for  Current Treatment Recommendations: Strengthening, ROM, Balance Training, Manual Therapy - Joint Manipulation, Manual Therapy - Soft Tissue Mobilization, Neuromuscular Re-education, Patient/Caregiver Education & Training, Pain Management, Home Exercise Program, Modalities  Plan Comment: Continue with current POC    Goals:  Patient goals : Improve gait and balance     Short term goals  Time Frame for Short term goals: 4 weeks  Short term goal 1: Blessing Rodas will complete 10 double leg heel raises without cramping   MET  Short term goal 2: Subhashs Tinetti score will improve to 24/28 indicating mild disability related to balance and gait. GOAL MET-24/28  Short term goal 3: Subhashs SL balance will improve to 20+ sec on right allowing greater ease with dressing ADLs. NOT MET: able to hold for3 sec    Long term goals  Time Frame for Long term goals : 8 weeks will cont for 4 more weeks  Long term goal 1: Subhashs gait will begin to normalize, demonstrating more of a heel/toe gait, to further minimize stress placed on right knee and hip.  ONGOING-it is improving  Long term goal 2: Subhashs DF ROM will return to neutral so that he may return to ascending/descending stairs with reciprocal gait PARTIALLY MET: able to ascend with reciprocal gait, descends going

## 2020-09-24 ENCOUNTER — HOSPITAL ENCOUNTER (OUTPATIENT)
Dept: PHYSICAL THERAPY | Age: 65
Setting detail: THERAPIES SERIES
Discharge: HOME | End: 2020-09-24
Payer: COMMERCIAL

## 2020-09-24 PROCEDURE — 97110 THERAPEUTIC EXERCISES: CPT

## 2020-09-24 ASSESSMENT — PAIN DESCRIPTION - LOCATION: LOCATION: ANKLE

## 2020-09-24 ASSESSMENT — PAIN DESCRIPTION - ORIENTATION: ORIENTATION: RIGHT

## 2020-09-24 ASSESSMENT — PAIN SCALES - GENERAL: PAINLEVEL_OUTOF10: 5

## 2020-09-24 NOTE — PROGRESS NOTES
[place]     Time In: 5246  Time Out: 0110  Minutes: 35  Timed Code Treatment Minutes: 35 Minutes                Date: 2020  Patient Name: Hernan Fu,  Gender:  male        CSN: [id number]   : 1955  (60 y.o.)  Referral Date : 20    Referring Practitioner: Dr. Ana Paula Arguello      Diagnosis: S/P flat foot reconstruction; fusion   Treatment Diagnosis: difficulty walking; right ankle pain/stiffness; balance and gait dysfunction    Additional Pertinent Hx: gouty arthritis; HTN                  General:  PT Visit Information  Onset Date: 20  PT Insurance Information: Primary: BC/BS; Secondary: Medical [place]; Deductible and max out of pocket are met; insurance pays at 100%  Total # of Visits to Date:   Plan of Care/Certification Expiration Date: 10/10/20  Progress Note Counter: 9/10 for PN. Subjective:  Family / Caregiver Present: No  Comments: 10-     Subjective: Patient reporting ankle pain 5/10 today and knee pain 3/10. Running a few minutes late to therapy and reporting walking faster was bothering him. Pain:  Patient Currently in Pain: Yes  Pain Assessment: 0-10  Pain Level: 5  Pain Location: Ankle  Pain Orientation: Right      Objective  Exercises  Exercise 1: Dynamic Gait:slow marching`15 feet  single leg stance on right 2 seconds and 1 x 9 seconds  Exercise 2: lunges on BOSU x 15 alternating fast. side lunges on BOSU x 15  Exercise 3: RockerBoard (standing) forward/back, side to side x25 each; 30 seconds balance each way. Right leg only F/B x15 with UE's for support - cues to try and let motion come from ankle and not knee  Exercise 4: NK table for R 7.5# knee flexion/extension x 20  Exercise 5: Leg/Toe Press 132# x 20 each. . 60# right leg/toe press 20 and 25 for toe press  Exercise 6: Step ups 6\"  forward x25 each with Right CC - no hands.  retro step up on 6' x25 , partial lower down without ER x15         Activity Tolerance:  Activity Tolerance: Patient Tolerated treatment well    Assessment: Body structures, Functions, Activity limitations: Decreased functional mobility , Decreased balance, Decreased ROM, Increased pain, Decreased posture, Decreased strength  Assessment: Patient complaining more about leg and knee pain today. Did try and have patient single leg stance and do slow marching with patient having difficulty with heel strike to mid stance. Prognosis: Good       Patient Education:work on single leg stance. Plan:  Times per week: 2  Plan weeks: 4  Specific instructions for Next Treatment: cont strengthening, add NK table, remove shoe for  Current Treatment Recommendations: Strengthening, ROM, Balance Training, Manual Therapy - Joint Manipulation, Manual Therapy - Soft Tissue Mobilization, Neuromuscular Re-education, Patient/Caregiver Education & Training, Pain Management, Home Exercise Program, Modalities  Plan Comment: Continue with current POC    Goals:  Patient goals : Improve gait and balance     Short term goals  Time Frame for Short term goals: 4 weeks  Short term goal 1: Jose Wagner will complete 10 double leg heel raises without cramping   MET  Short term goal 2: Subhashs Tinetti score will improve to 24/28 indicating mild disability related to balance and gait. GOAL MET-24/28  Short term goal 3: Subhashs SL balance will improve to 20+ sec on right allowing greater ease with dressing ADLs. NOT MET: able to hold for3 sec    Long term goals  Time Frame for Long term goals : 8 weeks will cont for 4 more weeks  Long term goal 1: Angel gait will begin to normalize, demonstrating more of a heel/toe gait, to further minimize stress placed on right knee and hip.  ONGOING-it is improving  Long term goal 2: Subhashs DF ROM will return to neutral so that he may return to ascending/descending stairs with reciprocal gait PARTIALLY MET: able to

## 2020-09-28 ENCOUNTER — HOSPITAL ENCOUNTER (OUTPATIENT)
Dept: PHYSICAL THERAPY | Age: 65
Setting detail: THERAPIES SERIES
Discharge: HOME | End: 2020-09-28
Payer: COMMERCIAL

## 2020-09-28 PROCEDURE — 97110 THERAPEUTIC EXERCISES: CPT

## 2020-09-28 NOTE — PROGRESS NOTES
[place]     Time In: 1300  Time Out: 1343  Minutes: 43  Timed Code Treatment Minutes: 37 Minutes                Date: 2020  Patient Name: Heather Sosa,  Gender:  male        CSN: [id number]   : 1955  (60 y.o.)  Referral Date : 20    Referring Practitioner: Dr. Harish Mendes      Diagnosis: S/P flat foot reconstruction; fusion   Treatment Diagnosis: difficulty walking; right ankle pain/stiffness; balance and gait dysfunction    Additional Pertinent Hx: gouty arthritis; HTN                  General:  PT Visit Information  Onset Date: 20  PT Insurance Information: Primary: BC/BS; Secondary: Medical [place]; Deductible and max out of pocket are met; insurance pays at 100%  Total # of Visits to Date:   Plan of Care/Certification Expiration Date: 10/10/20  Progress Note Due Date: 10/10/20  Progress Note Counter: 10/10               Subjective:  Family / Caregiver Present: No  Comments: 10-     Subjective: Patient reporting ankle pain 4/10 today and knee pain 3/10. he does take advil for pain at times tries to avoid it. .     Pain:  Patient Currently in Pain: Yes         Objective                           Exercises  Exercise 1: Dynamic Gait:slow marching`15 feet  single leg stance on right 2 seconds and 1 x 9 seconds  Exercise 2: lunges on BOSU x 15 alternating fast. side lunges on BOSU x 15  Exercise 3: RockerBoard (standing) forward/back, side to side x25 each; 30 seconds balance each way. Right leg only F/B x15 with UE's for support - cues to try and let motion come from ankle and not knee  Exercise 4: NK table for R 7.5# knee flexion/extension x 20  Exercise 5: Leg/Toe Press 132# x 20 each. . 60# right leg/toe press 20 and 25 for toe press  Exercise 6: Step ups 6\"  forward x25 each with Right CC - no hands.  retro step up on 6' x25 , partial lower down without ER x15  Exercise 7: calf stretch 3 walk on compliant and non-compliant surfaces so that he may eventually return to hunting and hiking.  PARTIALLY MET-he did go fishing and walked on uneven ground and rocks, however he was very hesitant    Bunny Fernando, PT

## 2020-10-01 ENCOUNTER — HOSPITAL ENCOUNTER (OUTPATIENT)
Dept: PHYSICAL THERAPY | Age: 65
Setting detail: THERAPIES SERIES
Discharge: HOME | End: 2020-10-01
Payer: COMMERCIAL

## 2020-10-01 PROCEDURE — 97110 THERAPEUTIC EXERCISES: CPT

## 2020-10-01 ASSESSMENT — PAIN DESCRIPTION - ORIENTATION: ORIENTATION: RIGHT

## 2020-10-01 ASSESSMENT — PAIN DESCRIPTION - LOCATION: LOCATION: ANKLE;LEG

## 2020-10-01 ASSESSMENT — PAIN DESCRIPTION - PAIN TYPE: TYPE: CHRONIC PAIN

## 2020-10-01 ASSESSMENT — PAIN SCALES - GENERAL: PAINLEVEL_OUTOF10: 4

## 2020-10-01 NOTE — PROGRESS NOTES
down without ER x15  Exercise 7: calf stretch 3 for  15 sec. heel raises off edge of step 10  Exercise 8: Hydro Stick: right SLS f/b, s/s and circles cw/ccw x15  Exercise 9: single leg stance on R tapping 3 cone x 10 cues to go slowly  Exercise 10: retro lunge x 10 bilat         Activity Tolerance:  Activity Tolerance: Patient Tolerated treatment well    Assessment: Body structures, Functions, Activity limitations: Decreased functional mobility , Decreased balance, Decreased ROM, Increased pain, Decreased posture, Decreased strength  Assessment: Continued to make progressions as noted with patient tolerating well. Patient did admit during session that he does not have a lot of confidence in leg yet. Patient reporting having more pain at end of session and reporting pain was 10/10. Asked patient if he needed to go to Emergency Room due to 10/10 pain and then patient reporting that pain was not that bad. Prognosis: Good       Patient Education:try working on single leg stance balance at home. Plan:  Times per week: 2  Plan weeks: 2  Specific instructions for Next Treatment: cont strengthening, add NK table, remove shoe for  Current Treatment Recommendations: Strengthening, ROM, Balance Training, Manual Therapy - Joint Manipulation, Manual Therapy - Soft Tissue Mobilization, Neuromuscular Re-education, Patient/Caregiver Education & Training, Pain Management, Home Exercise Program, Modalities  Plan Comment: Continue with current POC    Goals:  Patient goals : Improve gait and balance     Short term goals  Time Frame for Short term goals: 4 weeks  Short term goal 1: Anastasiia Burton will complete 10 double leg heel raises without cramping   MET  Short term goal 2: Lewis's Tinetti score will improve to 24/28 indicating mild disability related to balance and gait. GOAL MET-24/28  Short term goal 3: Lewis's SL balance will improve to 20+ sec on right allowing greater ease with dressing ADLs.   NOT MET: able to hold for3

## 2020-10-06 ENCOUNTER — HOSPITAL ENCOUNTER (OUTPATIENT)
Dept: PHYSICAL THERAPY | Age: 65
Setting detail: THERAPIES SERIES
Discharge: HOME | End: 2020-10-06
Payer: COMMERCIAL

## 2020-10-06 PROCEDURE — 97110 THERAPEUTIC EXERCISES: CPT

## 2020-10-06 ASSESSMENT — PAIN SCALES - GENERAL: PAINLEVEL_OUTOF10: 5

## 2020-10-06 ASSESSMENT — PAIN DESCRIPTION - ORIENTATION: ORIENTATION: RIGHT

## 2020-10-06 ASSESSMENT — PAIN DESCRIPTION - LOCATION: LOCATION: ANKLE;FOOT;LEG

## 2020-10-06 NOTE — PROGRESS NOTES
[place]     Time In: 9586  Time Out: [place]  Minutes: 56  Timed Code Treatment Minutes: 46 Minutes                Date: 10/6/2020  Patient Name: Austyn Flores,  Gender:  male        CSN: [id number]   : 1955  (60 y.o.)  Referral Date : 20    Referring Practitioner: Dr. Jane Boo      Diagnosis: S/P flat foot reconstruction; fusion   Treatment Diagnosis: difficulty walking; right ankle pain/stiffness; balance and gait dysfunction    Additional Pertinent Hx: gouty arthritis; HTN                  General:  PT Visit Information  Onset Date: 20  PT Insurance Information: Primary: BC/BS; Secondary: Medical [place]; Deductible and max out of pocket are met; insurance pays at 100%  Total # of Visits to Date: 32  Plan of Care/Certification Expiration Date: 10/10/20  Progress Note Counter: 12/10 for PN. Subjective:  Family / Caregiver Present: No  Comments: 10-     Subjective: Patient reportingp ain level 5/10 today. Pain:  Patient Currently in Pain: Yes  Pain Assessment: 0-10  Pain Level: 5  Pain Location: Ankle, Foot, Leg  Pain Orientation: Right      Objective  Exercises  Exercise 1: SLS on right 2-3 attempted for `6 seconds  Exercise 2: lunges on BOSU x 20 alternating fast. side lunges on BOSU x 20  Exercise 3: RockerBoard (standing) forward/back, side to side x30 each; 30 seconds balance each way. Right leg only F/B x15 with UE's for support - cues to try and let motion come from ankle and not knee  Exercise 4: NK table for R 7.5# knee flexion/extension x 20  Exercise 5: Leg/Toe Press 132# 2 x 15  each. . 60# right leg/toe press 20 and 25 for toe press  Exercise 6: Step ups 6\"  forward x 30 each with Right CC - no hands.  retro step up on 6' x30 , partial lower down without ER x30  Exercise 7: calf stretch 3 for  20 sec. heel raises off edge of step 20  Exercise 8: Hydro Stick: right SLS f/b, s/s and circles cw/ccw x15  Exercise 12: standing baps 4 directions x 15 UE support needed  Exercise 13: step stance and tandem stance 1 minute bilat  Exercise 14: Elliptical 4 minutes forward no resistance         Activity Tolerance:  Activity Tolerance: Patient Tolerated treatment well    Assessment: Body structures, Functions, Activity limitations: Decreased functional mobility , Decreased balance, Decreased ROM, Increased pain, Decreased posture, Decreased strength  Assessment: Continued to try and make progressions with patient in single leg stance with patient still having difficulty. Patient slow moving throughout session but having no other complains at end of session. Patient Education:continue to work on single leg stance. Plan:  Times per week: 2  Plan weeks: 2  Specific instructions for Next Treatment: cont strengthening, add NK table, remove shoe for  Current Treatment Recommendations: Strengthening, ROM, Balance Training, Manual Therapy - Joint Manipulation, Manual Therapy - Soft Tissue Mobilization, Neuromuscular Re-education, Patient/Caregiver Education & Training, Pain Management, Home Exercise Program, Modalities  Plan Comment: Continue with current POC    Goals:  Patient goals : Improve gait and balance     Short term goals  Time Frame for Short term goals: 4 weeks  Short term goal 1: Aster Schneider will complete 10 double leg heel raises without cramping   MET  Short term goal 2: Lewis's Tinetti score will improve to 24/28 indicating mild disability related to balance and gait. GOAL MET-24/28  Short term goal 3: Lewis's SL balance will improve to 20+ sec on right allowing greater ease with dressing ADLs.   NOT MET: able to hold for3 sec    Long term goals  Time Frame for Long term goals : 8 weeks will cont for 4 more weeks  Long term goal 1: Lewis's gait will begin to normalize, demonstrating more of a heel/toe gait, to further minimize stress placed on right knee and hip. ONGOING-it is improving  Long term goal 2: Lewis's DF ROM will return to neutral so that he may return to ascending/descending stairs with reciprocal gait PARTIALLY MET: able to ascend with reciprocal gait, descends going backwards  Long term goal 3: Discharge with independent HEP ONGOING  Long term goal 4: Mikaela Ramos will be able to walk on compliant and non-compliant surfaces so that he may eventually return to hunting and hiking.  PARTIALLY MET-he did go fishing and walked on uneven ground and rocks, however he was very hesitant    Roberta Hinds, PTA

## 2020-10-08 ENCOUNTER — HOSPITAL ENCOUNTER (OUTPATIENT)
Dept: PHYSICAL THERAPY | Age: 65
Setting detail: THERAPIES SERIES
Discharge: HOME | End: 2020-10-08
Payer: COMMERCIAL

## 2020-10-08 PROCEDURE — 97110 THERAPEUTIC EXERCISES: CPT

## 2020-10-08 NOTE — FLOWSHEET NOTE
[address]     Time In: 223  Time Out: [place]  Minutes: 49  Timed Code Treatment Minutes: 52 Minutes                Date: 10/8/2020  Patient Name: Karin Aldana,  Gender:  male        CSN: [id number]   : 1955  (60 y.o.)  Referral Date : 20    Referring Practitioner: Dr. Fina Tinsley      Diagnosis: S/P flat foot reconstruction; fusion   Treatment Diagnosis: difficulty walking; right ankle pain/stiffness; balance and gait dysfunction    Additional Pertinent Hx: gouty arthritis; HTN        TInetti         not continuous steps    General:  PT Visit Information  Onset Date: 20  PT Insurance Information: Primary: BC/BS; Secondary: Medical [place]; Deductible and max out of pocket are met; insurance pays at 100%  Total # of Visits to Date:   Plan of Care/Certification Expiration Date: 10/10/20  Progress Note Counter: 12/10 for PN. PN on 10-8-2020               Subjective:  Family / Caregiver Present: No  Comments: 10-     Subjective: Patient reporting pain level 5/10 today. lateral ankle to ant joint line  increases after exercise. Pain:  Patient Currently in Pain: Yes         Objective           Slight increased ankle pronation in stanceon the right compared to the left. Gait without shoes, no  push off medial foot 1st toes, slow speed, and he  avoids weight  shift to the right,   DF 8 degrees, moderate limits with inversion and eversion due to fusion  Single leg stance right 6 sec,  Gait speed     Weakness remains ankle PF on right 3-, other motions 4/5                       Exercises  Exercise 1: SLS on right 2-3 attempted for `6 seconds  Exercise 2: lunges on BOSU x 20 alternating fast. side lunges on BOSU x 20  Exercise 3: RockerBoard (standing) forward/back, dC side to side,; 30 seconds balance each way. Exercise 5: Leg/Toe Press 132# 2 x 15  each. . 60# right leg/toe press 20 and 25 for toe press  Exercise 6: Step ups 6\"  forward x 30 each with Right CC - no hands. retro step up on 6' x30 , partial lower down without ER x30-- previous visits  Exercise 7: calf stretch 3 for  20 sec. heel raises off edge of step 20, eccentric lower on the right  x 10  Exercise 8: Hydro Stick: right SLS f/b, s/s and circles cw/ccw x15  Exercise 12: standing baps 4 directions x 15 UE support needed  previous visits  Exercise 13: step stance and tandem stance 1 minute bilat  Exercise 14: Elliptical 4 minutes forward no resistance-- not today  Exercise 15: HEP  cont single elg toe raise,  marble with toes, increase speed for gait,    Dbl leg -- Toe ups  Neg x 10,  On step x 10    Balance Score: 16  Gait Score: 11  Tinetti Total Score: 27  Activity Tolerance:  Activity Tolerance: Patient limited by pain  Activity Tolerance: increased lateral ankle pain with all weight bearing activites, he also stated he does not have normal feeling at the right forfoot and into the 1st toe. .    Bill Brown  Assessment:he has plateauded  in progress, Pt continue to have moderate pain with all WB on the right. Discussed need  to address pain,and he states he want the \"natural\" recovery, versus covering up his pain. Prognosis: Fair           Plan:  Times per week: hold  Plan Comment: rec work conditioning, further means for pain releif for his lateral ankle and evaluation of his right knee pain which is slowing his abilitites to recover from the right ankle surgery. , If work hardening is recommended, he would need a new order stating work conditioning/work hardening. Goals:  Patient goals : Improve gait and balance     Short term goals  Time Frame for Short term goals: 4 weeks  Short term goal 1: Anastasiia Burton will complete 10 double leg heel raises without cramping   MET  Short term goal 2: Lewis's Tinetti score will improve to 24/28 indicating mild disability related to balance and gait.  GOAL MET-27/28  Short term goal 3: Lewis's SL balance will improve to 20+ sec on right allowing greater ease with dressing ADLs. NOT MET: 6 sec stand    Long term goals  Time Frame for Long term goals : 8 weeks will cont for 4 more weeks  Long term goal 1: Lewis's gait will begin to normalize, demonstrating more of a heel/toe gait, to further minimize stress placed on right knee and hip. NOT MET  Long term goal 2: Lewis's DF ROM will return to neutral so that he may return to ascending/descending stairs with reciprocal gait NOT MET    DF to 8 degrees,able to do reciprotal steps, but less weight shift the left,  Long term goal 3: independent HEP MET  Long term goal 4: Daniel Ortiz will be able to walk on compliant and non-compliant surfaces so that he may eventually return to hunting and hiking.  NOT MET -he did go fishing and walked on uneven ground and rocks, however he was very hesitant    Dave Sena, PT

## 2020-11-19 ENCOUNTER — HOSPITAL ENCOUNTER (OUTPATIENT)
Dept: PHYSICAL THERAPY | Age: 65
Setting detail: THERAPIES SERIES
Discharge: HOME | End: 2020-11-19
Payer: COMMERCIAL

## 2020-11-19 PROCEDURE — 97161 PT EVAL LOW COMPLEX 20 MIN: CPT

## 2020-11-19 NOTE — FLOWSHEET NOTE
** PLEASE SIGN, DATE AND TIME CERTIFICATION BELOW AND RETURN TO [facility] OUTPATIENT REHABILITATION (FAX #: [phone]). ATTEST/CO-SIGN IF ACCESSING VIA INStyleSeat. THANK YOU.**    I certify that I have examined the patient below and determined that Physical Medicine and Rehabilitation service is necessary and that I approve the established plan of care for up to 90 days or as specifically noted. Attestation, signature or co-signature of physician indicates approval of certification requirements.    ________________________ ____________ __________  Physician Signature   Date   Time  [address] [facility]  PHYSICAL THERAPY  [x] EVALUATION  [] DAILY NOTE (LAND) [] DAILY NOTE (AQUATIC ) [] PROGRESS NOTE [] DISCHARGE NOTE    [x] [facility]   [] [place] [address]    [] [facility]   [] [facility]    Date: 2020  Patient Name:  Yaron Osborne  : 1955  MRN: [id number]  CSN: [id number]    Referring Practitioner Johnson, Urszula Macias DPM   Diagnosis rt knee pain  rt foot reconstruction    Treatment Diagnosis Sp right foot fusion    Date of Evaluation 20    Additional Pertinent History No health issues, nothing preventing from motion , HBP       Functional Outcome Measure Used AM-PAC Basic mobility    Functional Outcome Score 44 (20)       Insurance: Primary: Payor: Margaret Humphries 150 /  /  / ,   Secondary:    Authorization Information: No co pay until the first of the year. Visit # 1, 1/10 for progress note   Visits Allowed:     Recertification Date: 5 weeks, dec 24th   Physician Follow-Up: Unknown     Physician Orders: eval and treat,   History of Present Illness: On 2019 he underwent a right ankle reconstruction/fusion. Back in  he started to notice that his foot started to breakdown with his arch completely collapsing. Dr. Zoey Ta initially wanted to do surgery 15 yrs ago, however Corina Osman put it off.  He has been recovering for the Pain:     X in shaded column indicates activity completed today   Modalities Parameters/  Location  Notes                     Manual Therapy Time/Technique  Notes                     Exercise/Intervention   Notes                                                                                  Specific Interventions Next Treatment: aggressive strengthening exericises, gait training , ice at home     Activity/Treatment Tolerance:  [x]  Patient tolerated treatment well  []  Patient limited by fatigue  []  Patient limited by pain   []  Patient limited by medical complications  []  Other:     Assessment: patient will benefit from aggressive strengthening to regain function of the right LE. ,   Body Structures/Functions/Activity Limitations: impaired ROM, impaired strength, pain and abnormal gait  Prognosis: good    GOALS:  Patient Goal: be active  Stronger leg and foot. Get in better shape. Short Term Goals:  Time Frame: see long term goals      Long Term Goals:  Time Frame: 5 weeks   Increase right LE strength to 5/5   Increase right ankle DF to 12   Patient to walk with normal push off at a fast pace 10 min   I HEP     Patient Education:   [x]  HEP/Education Completed: Plan of Care   [address] [place] [address] Access Code:  []  No new Education completed  []  Reviewed Prior HEP      []  Patient verbalized and/or demonstrated understanding of education provided. []  Patient unable to verbalize and/or demonstrate understanding of education provided. Will continue education. []  Barriers to learning: none     PLAN:  Treatment Recommendations: Strengthening, Range of Motion, Balance Training, Functional Mobility Training and Gait Training    [x]  Plan of care initiated. Plan to see patient 2 times per week for 5 weeks to address the treatment planned outlined above.   []  Continue with current plan of care  []  Modify plan of care as follows:    []  Hold pending physician visit  []  Discharge    Time In 1425   Time out [id number] Timed Code Minutes: 0 min   Total Treatment Time: 50  min       Electronically Signed by: Perla Preciado

## 2020-11-23 ENCOUNTER — HOSPITAL ENCOUNTER (OUTPATIENT)
Dept: PHYSICAL THERAPY | Age: 65
Setting detail: THERAPIES SERIES
Discharge: HOME | End: 2020-11-23
Payer: COMMERCIAL

## 2020-11-23 PROCEDURE — 97110 THERAPEUTIC EXERCISES: CPT

## 2020-11-23 NOTE — PROGRESS NOTES
[address] [facility]  PHYSICAL THERAPY  [] EVALUATION  [x] DAILY NOTE (LAND) [] DAILY NOTE (AQUATIC ) [] PROGRESS NOTE [] DISCHARGE NOTE    [x] OUTPATIENT REHABILITATION [facility]   [] [place] [address]    [] [facility]   [] Carmelo Lea    Date: 2020  Patient Name:  Collin Melchor  : 1955  MRN: [id number]  CSN: [id number]    Referring Practitioner Johnson, Cesia Nichols DPM   Diagnosis rt knee pain  rt foot reconstruction    Treatment Diagnosis Sp right foot fusion    Date of Evaluation 20    Additional Pertinent History No health issues, nothing preventing from motion , HBP       Functional Outcome Measure Used AM-PAC Basic mobility    Functional Outcome Score 44 (20)       Insurance: Primary: Payor: Denita Polanco /  /  / ,   Secondary:    Authorization Information: No co pay until the first of the year. Visit # 2, 2/10 for progress note   Visits Allowed:     Recertification Date: 5 weeks, dec 24th   Physician Follow-Up: Unknown     Physician Orders: eval and treat,   History of Present Illness: On 2019 he underwent a right ankle reconstruction/fusion. Back in  he started to notice that his foot started to breakdown with his arch completely collapsing. Dr. Bety Kyle initially wanted to do surgery 15 yrs ago, however Eliud Patel put it off. He has been recovering for the last 6 months with the first 4 months spent on crutches. Since surgery he struggled more with right knee/leg issues versus his actual foot. He complains more of right knee pain and tightness; he had been advised to start therapy months ago, however he was not ready due to increase in right LE pain. SUBJECTIVE:co pain in the whole right LE, at the knee and hip. Right ankle increases in pain with motion.           TREATMENT   Precautions:    Pain: Right foot 0/10 , right knee 4/10     X in shaded column indicates activity completed today   Modalities Parameters/  Location Notes                     Manual Therapy Time/Technique  Notes                     Exercise/Intervention   Notes   sprots art bike  Seat 9  6 min, level 6  x    Hamstring stretch, right only , b gastroc on step, soleus stretch on edge of step  3 15 sec  x Moderate pull in the ankle    Toe raises on step -- B  20 reps   x    Step ups  Right only  For, lateral and side 6 inch 10  x B UE hold, moderate use of hands for step taps down. On blue foam  Heel toe, march , squat  12  x    Rockerboard  AP only , avoid ML due to fusion 15 30 sec balance x No UE assist.           Leg press B, toe raises 120# 20 sec x 2  x                                              Hip flexor stretch  With  Right leg off side of bed    x To do at home -- shown to do at home   Prone prop 2 min,  Then stretch into prone knee flexion 3 for 30 sec,    x HEP    Hip ext off edge of mat, knee extended , knee flexed    x HEP  Needed cues to tense abd muscle to protect his low back    Prone knee flex R95, L 100, hip ext R 5 degrees,   x      Specific Interventions Next Treatment: aggressive strengthening and stretches to the right ankle,  gait training,  ice at home     Activity/Treatment Tolerance:  [x]  Patient tolerated treatment well  []  Patient limited by fatigue  []  Patient limited by pain   []  Patient limited by medical complications  []  Other:     Assessment:very talkative during PT. Moderate tightness noted in the right hip flexors and quad,. Shown stretches for home to address this at home. If he continues to complain of pain in the right hip and knee, will encourage him to see a physician to have this assessed further. Body Structures/Functions/Activity Limitations: impaired ROM, impaired strength, pain and abnormal gait  Prognosis: good    GOALS:  Patient Goal: be active  Stronger leg and foot. Get in better shape.       Short Term Goals:  Time Frame: see long term goals      Long Term Goals:  Time Frame: 5 weeks   Increase right

## 2020-11-25 ENCOUNTER — HOSPITAL ENCOUNTER (OUTPATIENT)
Dept: PHYSICAL THERAPY | Age: 65
Setting detail: THERAPIES SERIES
Discharge: HOME | End: 2020-11-25
Payer: COMMERCIAL

## 2020-11-30 ENCOUNTER — HOSPITAL ENCOUNTER (OUTPATIENT)
Dept: PHYSICAL THERAPY | Age: 65
Setting detail: THERAPIES SERIES
Discharge: HOME | End: 2020-11-30
Payer: COMMERCIAL

## 2020-11-30 PROCEDURE — 97110 THERAPEUTIC EXERCISES: CPT

## 2020-11-30 NOTE — PROGRESS NOTES
7[address] [facility]  PHYSICAL THERAPY  [] EVALUATION  [x] DAILY NOTE (LAND) [] DAILY NOTE (AQUATIC ) [] PROGRESS NOTE [] DISCHARGE NOTE    [x] OUTPATIENT REHABILITATION [facility]   [] [place] [address]    [] [facility]   [] [place]     Date: 2020  Patient Name:  Manoj Torres  : 1955  MRN: [id number]  CSN: [id number]    Referring Practitioner Johnson, Tawanna Parikh DPM   Diagnosis rt knee pain  rt foot reconstruction    Treatment Diagnosis Sp right foot fusion    Date of Evaluation 20    Additional Pertinent History No health issues, nothing preventing from motion , HBP       Functional Outcome Measure Used -PAC Basic mobility    Functional Outcome Score 44 (20)       Insurance: Primary: Payor: Rajinder Regan /  /  / ,   Secondary:    Authorization Information: No co pay until the first of the year. Visit # 3,3/10 for progress note   Visits Allowed:     Recertification Date: 5 weeks, dec 24th   Physician Follow-Up: Unknown     Physician Orders: eval and treat,   History of Present Illness: On 2019 he underwent a right ankle reconstruction/fusion. Back in  he started to notice that his foot started to breakdown with his arch completely collapsing. Dr. Natalie Baeza initially wanted to do surgery 15 yrs ago, however Avelino Akbar put it off. He has been recovering for the last 6 months with the first 4 months spent on crutches. Since surgery he struggled more with right knee/leg issues versus his actual foot. He complains more of right knee pain and tightness; he had been advised to start therapy months ago, however he was not ready due to increase in right LE pain. SUBJECTIVE:sore all over today.        TREATMENT   Precautions:    Pain: Right lateral calf and ankle 4/10 , right knee 4/10     X in shaded column indicates activity completed today   Modalities Parameters/  Location  Notes                     Manual Therapy Time/Technique  Notes Exercise/Intervention   Notes   sports art bike  Seat 9  6 min, level 6  x    Hamstring stretch, right only, b gastroc on step, soleus stretch on edge of step  3 15 sec  x Moderate pull in the ankle    Toe raises on step -- B  20 reps   x    Step ups  Right only  For, lateral and side 6 inch 15  x B UE hold, moderate use of hands for step taps down. On blue foam  Heel toe, march, squat  15  x    Rockerboard  AP only , avoid ML due to fusion, single leg  With UE assist and FWB  15 30 sec balance x No UE assist.    SLS on rockerboard AP  30   x    Leg press B, toe raises 120# 20  x 2  x    rebounder x 10 SLS on the RIght 10   x Frequent touches on right,                                      Hip flexor stretch  With  Right leg off side of bed     To do at home -- shown to do at home   Prone prop 2 min,  Then stretch into prone knee flexion 3 for 30 sec,     HEP    Hip ext off edge of mat, knee extended , knee flexed     HEP  Needed cues to tense abd muscle to protect his low back    Prone knee flex R 95, L 100, hip ext R 5 degrees,         Specific Interventions Next Treatment: aggressive strengthening and stretches to the right ankle,  gait training,  ice at home     Activity/Treatment Tolerance:  [x]  Patient tolerated treatment well  []  Patient limited by fatigue  []  Patient limited by pain   []  Patient limited by medical complications  []  Other:     Assessment:more achy today HE was doing a lot of  activities plus weather    Body Structures/Functions/Activity Limitations: impaired ROM, impaired strength, pain and abnormal gait  Prognosis: good    GOALS:  Patient Goal: be active  Stronger leg and foot. Get in better shape.       Short Term Goals:  Time Frame: see long term goals      Long Term Goals:  Time Frame: 5 weeks   Increase right LE strength to 5/5   Increase right ankle DF to 12   Patient to walk with normal push off at a fast pace 10 min   I HEP     Patient Education:   [x] HEP/Education Completed: Plan of Care   [address] Access Code:  []  No new Education completed  []  Reviewed Prior HEP      []  Patient verbalized and/or demonstrated understanding of education provided. []  Patient unable to verbalize and/or demonstrate understanding of education provided. Will continue education. []  Barriers to learning: none     PLAN:  Treatment Recommendations: Strengthening, Range of Motion, Balance Training, Functional Mobility Training and Gait Training    [x]  Plan of care initiated. Plan to see patient 2 times per week for 5 weeks to address the treatment planned outlined above.   []  Continue with current plan of care  []  Modify plan of care as follows:    []  Hold pending physician visit  []  Discharge    Time In 1300   Time out 1340   Timed Code Minutes: 40   Total Treatment Time: 40       Electronically Signed by: Marylee Estelle

## 2020-12-03 ENCOUNTER — HOSPITAL ENCOUNTER (OUTPATIENT)
Dept: PHYSICAL THERAPY | Age: 65
Setting detail: THERAPIES SERIES
Discharge: HOME | End: 2020-12-03
Payer: COMMERCIAL

## 2020-12-03 PROCEDURE — 97110 THERAPEUTIC EXERCISES: CPT

## 2020-12-03 NOTE — PROGRESS NOTES
7[address] [facility]  PHYSICAL THERAPY  [] EVALUATION  [x] DAILY NOTE (LAND) [] DAILY NOTE (AQUATIC ) [] PROGRESS NOTE [] DISCHARGE NOTE    [x] OUTPATIENT REHABILITATION [facility]   [] [place] [address]    [] [facility]   [] Jacinda Ring    Date: 12/3/2020  Patient Name:  Karin Aldana  : 1955  MRN: [id number]  CSN: [id number]    Referring Practitioner Johnson, Sanjay Palacio DPM   Diagnosis rt knee pain  rt foot reconstruction    Treatment Diagnosis Sp right foot fusion    Date of Evaluation 20    Additional Pertinent History No health issues, nothing preventing from motion , HBP       Functional Outcome Measure Used AM-PAC Basic mobility    Functional Outcome Score 44 (20)       Insurance: Primary: Payor: Margaret Humphries 150 /  /  / ,   Secondary:    Authorization Information: No co pay until the first of the year. Visit # 4,4/10 for progress note   Visits Allowed:     Recertification Date: 5 weeks, dec 24th   Physician Follow-Up: Unknown     Physician Orders: eval and treat,   History of Present Illness: On 2019 he underwent a right ankle reconstruction/fusion. Back in  he started to notice that his foot started to breakdown with his arch completely collapsing. Dr. Fannie Burdick initially wanted to do surgery 15 yrs ago, however Marcelo Alvarez put it off. He has been recovering for the last 6 months with the first 4 months spent on crutches. Since surgery he struggled more with right knee/leg issues versus his actual foot. He complains more of right knee pain and tightness; he had been advised to start therapy months ago, however he was not ready due to increase in right LE pain. SUBJECTIVE: Pt stated R ankle still not feeling correct. Pt stated he can't do his \"normal job because he doesn't have the flexibility that he use to have\".  Pt stated he continues to sensation issues and he can't feel R calf muscle      TREATMENT   Precautions:    Pain: Right lateral long term goals      Long Term Goals:  Time Frame: 5 weeks   Increase right LE strength to 5/5   Increase right ankle DF to 12   Patient to walk with normal push off at a fast pace 10 min   I HEP     Patient Education:   []  HEP/Education Completed: Plan of Care   Kaveh Sousa Access Code:  []  No new Education completed  [x]  Reviewed Prior HEP      [x]  Patient verbalized and/or demonstrated understanding of education provided. []  Patient unable to verbalize and/or demonstrate understanding of education provided. Will continue education. []  Barriers to learning: none     PLAN:  Treatment Recommendations: Strengthening, Range of Motion, Balance Training, Functional Mobility Training and Gait Training    []  Plan of care initiated. Plan to see patient 2 times per week for 5 weeks to address the treatment planned outlined above.   [x]  Continue with current plan of care  []  Modify plan of care as follows:    []  Hold pending physician visit  []  Discharge    Time In 1300   Time out 1340   Timed Code Minutes: 40   Total Treatment Time: 40       Electronically Signed by: Tarun Lopez

## 2020-12-07 ENCOUNTER — HOSPITAL ENCOUNTER (OUTPATIENT)
Dept: PHYSICAL THERAPY | Age: 65
Setting detail: THERAPIES SERIES
Discharge: HOME | End: 2020-12-07
Payer: COMMERCIAL

## 2020-12-07 PROCEDURE — 97110 THERAPEUTIC EXERCISES: CPT

## 2020-12-07 NOTE — PROGRESS NOTES
7[address] [facility]  PHYSICAL THERAPY  [] EVALUATION  [x] DAILY NOTE (LAND) [] DAILY NOTE (AQUATIC ) [] PROGRESS NOTE [] DISCHARGE NOTE    [x] OUTPATIENT REHABILITATION [facility]   [] [place] [address]    [] [facility]   [] Pinky Lefort    Date: 2020  Patient Name:  Zhou Campbell  : 1955  MRN: [id number]  CSN: [id number]    Referring Practitioner Johnson, Erica Ramos DPM   Diagnosis rt knee pain  rt foot reconstruction    Treatment Diagnosis Sp right foot fusion    Date of Evaluation 20    Additional Pertinent History No health issues, nothing preventing from motion , HBP       Functional Outcome Measure Used -PAC Basic mobility    Functional Outcome Score 44 (20)       Insurance: Primary: Payor: Lesley Pink /  /  / ,   Secondary:    Authorization Information: No co pay until the first of the year. Visit # 5,5/10 for progress note   Visits Allowed:     Recertification Date: 5 weeks, dec 24th   Physician Follow-Up: Unknown     Physician Orders: eval and treat,   History of Present Illness: On 2019 he underwent a right ankle reconstruction/fusion. Back in  he started to notice that his foot started to breakdown with his arch completely collapsing. Dr. Lizandro Canales initially wanted to do surgery 15 yrs ago, however Lisa Burden put it off. He has been recovering for the last 6 months with the first 4 months spent on crutches. Since surgery he struggled more with right knee/leg issues versus his actual foot. He complains more of right knee pain and tightness; he had been advised to start therapy months ago, however he was not ready due to increase in right LE pain. SUBJECTIVE: Right knee is sore. Svetlana Money He has been working on stretching at home. TREATMENT   Precautions:    Pain: Right lateral calf and ankle 0/10 , right knee annoying.  3/10     X in shaded column indicates activity completed today   Modalities Parameters/  Location  Notes Manual Therapy Time/Technique  Notes                     Exercise/Intervention   Notes   sports art bike  Seat 9  5 min, level 6  x    Hamstring stretch, right only, b gastroc on step, soleus stretch on edge of step  3 30 sec for g/s 15 for hamstring  x Moderate pull in the ankle    Toe raises on step -- B , then 5 negative on right 20 reps   x    Step ups  Right only  For, lateral, retro 6 inch 20x  x B UE hold, moderate use of hands for step taps down. On blue foam  Heel toe, march, squat  20x  x Pain in the right ant hip with high march   Rockerboard  AP only , avoid ML due to fusion, single leg  With UE assist and FWB  15 30 sec balance x No UE assist.    SLS on rockerboard AP  30   x    Leg press B, toe raises 120# 20  x 2  x Single leg (R) 50# X10 with 2 sets   rebounder x 10 SLS on the RIght x20   x Frequent touches with L LE                                      Hip flexor stretch  With  Right leg off side of bed     To do at home -- shown to do at home   Prone prop 2 min,  Then stretch into prone knee flexion 3 for 30 sec,     HEP    Hip ext off edge of mat, knee extended , knee flexed     HEP  Needed cues to tense abd muscle to protect his low back    Prone knee flex R 95, L 100, hip ext R 5 degrees,         Specific Interventions Next Treatment: aggressive strengthening and stretches to the right ankle,  gait training,  ice at home     Activity/Treatment Tolerance:  [x]  Patient tolerated treatment well  []  Patient limited by fatigue  []  Patient limited by pain   []  Patient limited by medical complications  []  Other:     Assessment:Pt continues to c/o pain and discomfort in R lateral calf with burning from knee to ankle. Pt continues to work on increased R ankle strengthening. Added single leg press this date. GOALS:  Patient Goal: be active  Stronger leg and foot. Get in better shape.       Short Term Goals:  Time Frame: see long term goals      Long Term Goals:  Time Frame: 5

## 2020-12-10 ENCOUNTER — HOSPITAL ENCOUNTER (OUTPATIENT)
Dept: PHYSICAL THERAPY | Age: 65
Setting detail: THERAPIES SERIES
Discharge: HOME | End: 2020-12-10
Payer: COMMERCIAL

## 2020-12-10 PROCEDURE — 97110 THERAPEUTIC EXERCISES: CPT

## 2020-12-10 NOTE — PROGRESS NOTES
DF to 12   Patient to walk with normal push off at a fast pace 10 min   I HEP     Patient Education:   []  HEP/Education Completed: Plan of Care, encouraged to get hip and knee looked at for cause of pain   [employer] Access Code:  []  No new Education completed  [x]  Reviewed Prior HEP      [x]  Patient verbalized and/or demonstrated understanding of education provided. []  Patient unable to verbalize and/or demonstrate understanding of education provided. Will continue education. []  Barriers to learning: none     PLAN:  Treatment Recommendations: Strengthening, Range of Motion, Balance Training, Functional Mobility Training and Gait Training    []  Plan of care initiated. Plan to see patient 2 times per week for 5 weeks to address the treatment planned outlined above.   [x]  Continue with current plan of care  []  Modify plan of care as follows:    []  Hold pending physician visit  []  Discharge    Time In 1300   Time out 1340   Timed Code Minutes: 40   Total Treatment Time: 40       Electronically Signed by: Rosalinda Mcdermott

## 2020-12-14 RX ORDER — LISINOPRIL 20 MG/1
20 TABLET ORAL DAILY
Qty: 30 TABLET | Refills: 0 | Status: SHIPPED | OUTPATIENT
Start: 2020-12-14 | End: 2020-12-29 | Stop reason: SDUPTHER

## 2020-12-14 NOTE — TELEPHONE ENCOUNTER
Received a faxed refill request from [address] [place] [address] for lisinopril 20mg daily. Seen Audrey Nieto for acute visit 3/17/20  Seen ANDRE for Pre-op 12/12/19  Pt due for routine appt. Left a message on pt's machine to call back to be informed. HIPAA is outdated. Will pend 30 days until get appt.

## 2020-12-15 ENCOUNTER — HOSPITAL ENCOUNTER (OUTPATIENT)
Dept: PHYSICAL THERAPY | Age: 65
Setting detail: THERAPIES SERIES
End: 2020-12-15
Payer: COMMERCIAL

## 2020-12-17 ENCOUNTER — HOSPITAL ENCOUNTER (OUTPATIENT)
Dept: PHYSICAL THERAPY | Age: 65
Setting detail: THERAPIES SERIES
Discharge: HOME | End: 2020-12-17
Payer: COMMERCIAL

## 2020-12-17 PROCEDURE — 97110 THERAPEUTIC EXERCISES: CPT

## 2020-12-17 NOTE — PROGRESS NOTES
Hamstring stretch, right only, b gastroc on step, soleus stretch on edge of step  3 30 sec for g/s 15 for hamstring  x Moderate pull in the ankle    Toe raises on step -- B, with hip internal rotation, external rotation 10 reps   x    Step ups  Right only  For, lateral, retro 6 inch 20x  x B UE hold, moderate use of hands for step taps down. On blue foam  Heel toe, march, squat  20x  x Pain in the right ant hip with high march   Rockerboard  AP only , avoid ML due to fusion, single leg  With UE assist and FWB  20 30 sec balance x No UE assist.    Leg press B, toe raises 130# 15  x 2  x Single leg (R) 60# X15   rebounder  Step stance. Right SLS with left toe back x20   x R/L foot back   R foot step ups on BOSU (forward/lateral)  15  x Increased pain in R ankle   Heel raises off edge of // bars  15  x                         Hip flexor stretch  With  Right leg off side of bed     To do at home -- shown to do at home   Prone prop 2 min,  Then stretch into prone knee flexion 3 for 30 sec,     HEP    Hip ext off edge of mat, knee extended , knee flexed     HEP  Needed cues to tense abd muscle to protect his low back    Prone knee flex R 95, L 100, hip ext R 5 degrees,         Specific Interventions Next Treatment: aggressive strengthening and stretches to the right ankle,  gait training,  ice at home     Activity/Treatment Tolerance:  [x]  Patient tolerated treatment well  []  Patient limited by fatigue  []  Patient limited by pain   []  Patient limited by medical complications  []  Other:     Assessment: Continued to try and make progressions as noted with patient having fair tolerance. Patient reporting having more pain at end of session. GOALS:  Patient Goal: be active  Stronger leg and foot. Get in better shape.     Short Term Goals:  Time Frame: see long term goals  Long Term Goals:  Time Frame: 5 weeks   Increase right LE strength to 5/5   Increase right ankle DF to 12   Patient to walk with normal push off at a fast pace 10 min   I HEP     Patient Education:   []  HEP/Education Completed:continue to work on heel raises off step   [address] Access Code:  []  No new Education completed  [x]  Reviewed Prior HEP      [x]  Patient verbalized and/or demonstrated understanding of education provided. []  Patient unable to verbalize and/or demonstrate understanding of education provided. Will continue education. []  Barriers to learning: none     PLAN:  Treatment Recommendations: Strengthening, Range of Motion, Balance Training, Functional Mobility Training and Gait Training      [x]  Continue with current plan of care. 2 times per week for 5 weeks.   []  Modify plan of care as follows:    []  Hold pending physician visit  []  Discharge    Time In 1249   Time out 1338   Timed Code Minutes: 49   Total Treatment Time: 49       Electronically Signed by: Annice Bloch

## 2020-12-21 ENCOUNTER — HOSPITAL ENCOUNTER (OUTPATIENT)
Dept: PHYSICAL THERAPY | Age: 65
Setting detail: THERAPIES SERIES
Discharge: HOME | End: 2020-12-21
Payer: COMMERCIAL

## 2020-12-21 PROCEDURE — 97110 THERAPEUTIC EXERCISES: CPT

## 2020-12-21 NOTE — PROGRESS NOTES
[address] [facility]  PHYSICAL THERAPY  [] DAILY NOTE (LAND) [] DAILY NOTE (AQUATIC ) [x] PROGRESS NOTE [] DISCHARGE NOTE    [x] OUTPATIENT REHABILITATION CENTER [facility]   [] [place] [address]    [] [facility]   [] [facility]    Date: 2020  Patient Name:  Cassandra Prasad  : 1955  MRN: [id number]  CSN: [id number]    Referring Practitioner Johnson, Cheyanne Gill DPM   Diagnosis rt knee pain  rt foot reconstruction    Treatment Diagnosis Sp right foot fusion    Date of Evaluation 20    Additional Pertinent History No health issues, nothing preventing from motion , HBP       Functional Outcome Measure Used AM-PAC Basic mobility    Functional Outcome Score 44 (20)  46 (2020)      Insurance: Primary: Payor: Margaret Humphries 150 /  /  / ,   Secondary:    Authorization Information: No co pay until the first of the year. Visit # 8  8/10 for progress note   Visits Allowed:     Recertification Date: 5 weeks, dec 24th   Physician Follow-Up: Unknown     Physician Orders: eval and treat,   History of Present Illness: On 2019 he underwent a right ankle reconstruction/fusion. Back in  he started to notice that his foot started to breakdown with his arch completely collapsing. Dr. Alexander Chambers initially wanted to do surgery 15 yrs ago, however Charo Najera put it off. He has been recovering for the last 6 months with the first 4 months spent on crutches. Since surgery he struggled more with right knee/leg issues versus his actual foot. He complains more of right knee pain and tightness; he had been advised to start therapy months ago, however he was not ready due to increase in right LE pain. SUBJECTIVE: posterior and medial knee pain. 3/10  Ankle discomfort 3/10  Sees  2020  He states he is hoping to RTW on February.   He is feeling that his whole right leg is not working well  See family   on the -- He has been told to have his family Dr address his hip and knee pain . TREATMENT   Precautions:    Pain:  right knee  3/10     X in shaded column indicates activity completed today   Exercise/Intervention   Notes   sports art bike  Seat 9  5 min, level 7  x    Hamstring stretch, right only, b gastroc on step, soleus stretch on edge of step  3 30 sec for g/s 15 for hamstring  x Moderate pull in the ankle    Toe raises on step -- B, with hip internal rotation, external rotation 15reps   x    Step ups  Right only  For, lateral, retro 6 inch 20x  x B UE hold, moderate use of hands for step taps down. On blue foam  Heel toe, march, squat  20x  x Pain in the right ant hip with high march   Rockerboard  AP only , avoid ML due to fusion, single leg  With UE assist and FWB  20 30 sec balance x No UE assist.    Leg press B, toe raises 130# 15  x 2  x Single leg (R) 60# X15   rebounder  Step stance.  Right SLS with left toe back x20   x R/L foot back   R foot step ups on BOSU (forward/lateral)  15  x Increased pain in R ankle   Heel raises off edge of // bars  15      sls right  32 sec   x                  Hip flexor stretch  With  Right leg off side of bed     To do at home -- shown to do at home   Prone prop 2 min,  Then stretch into prone knee flexion 3 for 30 sec,     HEP    Hip ext off edge of mat, knee extended , knee flexed     HEP  Needed cues to tense abd muscle to protect his low back    Prone knee flex R 95, L 100, hip ext R 5 degrees             Range of Motion PF43R, L 60,  DF  R 10 , L 12,    Strength-- right hip flex 4+, knee ext and flex 4+ , left 4/5,   Right ankle PF with leg extended 3-/5, with knee flexed 3/5   Gait -- normal pace with improved push off       Specific Interventions Next Treatment: aggressive strengthening and stretches to the right ankle,  gait training,  ice at home     Activity/Treatment Tolerance:  [x]  Patient tolerated treatment well  []  Patient limited by fatigue  []  Patient limited by pain   []  Patient limited by medical

## 2020-12-28 ENCOUNTER — HOSPITAL ENCOUNTER (OUTPATIENT)
Dept: PHYSICAL THERAPY | Age: 65
Setting detail: THERAPIES SERIES
Discharge: HOME | End: 2020-12-28
Payer: COMMERCIAL

## 2020-12-28 PROCEDURE — 97035 APP MDLTY 1+ULTRASOUND EA 15: CPT

## 2020-12-28 PROCEDURE — 97110 THERAPEUTIC EXERCISES: CPT

## 2020-12-28 NOTE — FLOWSHEET NOTE
** PLEASE SIGN, DATE AND TIME CERTIFICATION BELOW AND RETURN TO [facility] OUTPATIENT REHABILITATION (FAX #: [phone]). ATTEST/CO-SIGN IF ACCESSING VIA [employer]. THANK YOU.**    I certify that I have examined the patient below and determined that Physical Medicine and Rehabilitation service is necessary and that I approve the established plan of care for up to 90 days or as specifically noted. Attestation, signature or co-signature of physician indicates approval of certification requirements.    ________________________ ____________ __________  Physician Signature   Date   Time    [address] [facility] beds at the [facility]  PHYSICAL THERAPY  [x] DAILY NOTE (LAND) [] DAILY NOTE (AQUATIC ) [] PROGRESS NOTE [] DISCHARGE NOTE    [x] [facility]   [] [facility] 90    [] [address] [facility]   [] Yomi Favors    Date: 2020  Patient Name:  Katalina Lange  : 1955  MRN: [id number]  CSN: [id number]    Referring Practitioner Johnson, Ron Davalos DPM   Diagnosis rt knee pain  rt foot reconstruction  pain sural neuritis    Treatment Diagnosis Sp right foot fusion    Date of Evaluation 20    Additional Pertinent History No health issues, nothing preventing from motion , HBP       Functional Outcome Measure Used -PAC Basic mobility    Functional Outcome Score 44 (20)  46 (2020)      Insurance: Primary: Payor: Bernadette Anderson /  /  / ,   Secondary:    Authorization Information: No co pay until the first of the year. Visit # 9  1/10 for progress note   Visits Allowed:     Recertification Date:    Physician Follow-Up: Unknown     Physician Orders: eval and treat   History of Present Illness: On 2019 he underwent a right ankle reconstruction/fusion. Back in  he started to notice that his foot started to breakdown with his arch completely collapsing. Dr. Olga Lidia Begum initially wanted to do surgery 15 yrs ago, however Autumn Pump put it off.  He has been recovering for the last 6 months with the first 4 months spent on crutches. Since surgery he struggled more with right knee/leg issues versus his actual foot. He complains more of right knee pain and tightness; he had been advised to start therapy months ago, however he was not ready due to increase in right LE pain. SUBJECTIVE: Saw Dr Marbella Diaz last week. New prescription to cont with PT, possibly try dry needling or US  for pain releif. He also did a injection on the  lateral ankle -- which he had relief in pain for 2.5 days. Pt report getting a little depressed due to slow return to function. He stated he may go to [place] for a second opinion     TREATMENT   Precautions:    Pain:  4/10 ankle lateral and ant  Joint, worse as he increased activity    X in shaded column indicates activity completed today   Exercise/Intervention   Notes   sports art bike  Seat 9  5 min, level 7  x    Hamstring stretch, right only, b gastroc on step, soleus stretch on edge of step  3 30 sec for g/s 15 for hamstring  x Moderate pull in the ankle    Toe raises on step -- B, with hip internal rotation, external rotation 15reps   x    Step ups  Right only  For, lateral, retro 6 inch 20x  x B UE hold, moderate use of hands for step taps down. On blue foam  Heel toe, march, squat  20x  x Pain in the right ant hip with high march   Rockerboard  AP only , avoid ML due to fusion, single leg  With UE assist and FWB  20 30 sec balance  No UE assist.    Leg press B, toe raises 130# 15  x 2   Single leg (R) 60# X15   rebounder  Step stance.  Right SLS with left toe back x20    R/L foot back   R foot step ups on BOSU (forward/lateral)  15   Increased pain in R ankle   Heel raises off edge of // bars  15      sls right  32 sec              US to right ant lat ankle joint -- long sitting with head of table elevated--  richmar 1.2 cont  8 min,  x Less pain after    Hip flexor stretch  With  Right leg off side of bed     To do at home -- shown to do at home   Prone prop 2 min,  Then stretch into prone knee flexion 3 for 30 sec,     HEP    Hip ext off edge of mat, knee extended , knee flexed     HEP  Needed cues to tense abd muscle to protect his low back    Prone knee flex R 95, L 100, hip ext R 5 degrees             Specific Interventions Next Treatment: cont US for pain relief. Activity/Treatment Tolerance:  [x]  Patient tolerated treatment well  []  Patient limited by fatigue  []  Patient limited by pain   []  Patient limited by medical complications  []  Other:     Assessment: increased ROM and strength of the right LE. He has plateaued in progress with PT  reccommend work conditioning program if he is going to cont with therapy . GOALS:  Patient Goal: be active  Stronger leg and foot. Get in better shape. Short Term Goals:  Time Frame: see long term goals  Long Term Goals:  Time Frame: 5 weeks   Increase right LE strength to 5/5   Increase right ankle DF to 12    Patient to walk with normal push off at a fast pace 10 min  I HEP  Pain less than 2/10 after activity. Patient Education:   []  HEP/Education Completed:continue to work on heel raises off step, ice more frequently.  [employer] Access Code:  []  No new Education completed  [x]  Reviewed Prior HEP      [x]  Patient verbalized and/or demonstrated understanding of education provided. []  Patient unable to verbalize and/or demonstrate understanding of education provided. Will continue education. []  Barriers to learning: none     PLAN: cont to trial modes for pain relief. Treatment Recommendations: Strengthening, Range of Motion, Balance Training, Functional Mobility Training and Gait Training      [x]  Continue with current plan of care. 2 times per week for 5 weeks.   []  Modify plan of care as follows:    []  Hold pending physician visit rec discharge,   Time In 1045   Time out 1130   Timed Code Minutes: 46   Total Treatment Time: 46       Electronically Signed by: Sharlee Canavan

## 2020-12-29 ENCOUNTER — OFFICE VISIT (OUTPATIENT)
Dept: FAMILY MEDICINE CLINIC | Age: 65
End: 2020-12-29
Payer: COMMERCIAL

## 2020-12-29 VITALS
BODY MASS INDEX: 27.59 KG/M2 | HEIGHT: 74 IN | DIASTOLIC BLOOD PRESSURE: 66 MMHG | HEART RATE: 80 BPM | RESPIRATION RATE: 20 BRPM | TEMPERATURE: 97.2 F | WEIGHT: 215 LBS | SYSTOLIC BLOOD PRESSURE: 138 MMHG

## 2020-12-29 DIAGNOSIS — I10 ESSENTIAL HYPERTENSION: ICD-10-CM

## 2020-12-29 DIAGNOSIS — Z00.00 ROUTINE GENERAL MEDICAL EXAMINATION AT A HEALTH CARE FACILITY: Primary | ICD-10-CM

## 2020-12-29 DIAGNOSIS — Z12.5 SCREENING PSA (PROSTATE SPECIFIC ANTIGEN): ICD-10-CM

## 2020-12-29 DIAGNOSIS — G89.29 CHRONIC FOOT PAIN, UNSPECIFIED LATERALITY: ICD-10-CM

## 2020-12-29 DIAGNOSIS — M79.673 CHRONIC FOOT PAIN, UNSPECIFIED LATERALITY: ICD-10-CM

## 2020-12-29 PROCEDURE — 99396 PREV VISIT EST AGE 40-64: CPT | Performed by: FAMILY MEDICINE

## 2020-12-29 RX ORDER — LISINOPRIL 20 MG/1
20 TABLET ORAL DAILY
Qty: 90 TABLET | Refills: 3 | Status: SHIPPED | OUTPATIENT
Start: 2020-12-29 | End: 2022-01-19

## 2020-12-30 ENCOUNTER — HOSPITAL ENCOUNTER (OUTPATIENT)
Age: 65
Discharge: HOME | End: 2020-12-30
Payer: COMMERCIAL

## 2020-12-30 LAB
ALBUMIN SERPL-MCNC: 4.8 G/DL (ref 3.5–5.1)
ALP BLD-CCNC: 110 U/L (ref 38–126)
ALT SERPL-CCNC: 52 U/L (ref 11–66)
ANION GAP SERPL CALCULATED.3IONS-SCNC: 12 MEQ/L (ref 8–16)
AST SERPL-CCNC: 38 U/L (ref 5–40)
BASOPHILS # BLD: 1.1 %
BASOPHILS ABSOLUTE: 0.1 THOU/MM3 (ref 0–0.1)
BILIRUB SERPL-MCNC: 0.5 MG/DL (ref 0.3–1.2)
BUN BLDV-MCNC: 17 MG/DL (ref 7–22)
CALCIUM SERPL-MCNC: 9.8 MG/DL (ref 8.5–10.5)
CHLORIDE BLD-SCNC: 101 MEQ/L (ref 98–111)
CHOLESTEROL, TOTAL: 240 MG/DL (ref 100–199)
CO2: 27 MEQ/L (ref 23–33)
CREAT SERPL-MCNC: 0.6 MG/DL (ref 0.4–1.2)
EOSINOPHIL # BLD: 2.2 %
EOSINOPHILS ABSOLUTE: 0.2 THOU/MM3 (ref 0–0.4)
ERYTHROCYTE [DISTWIDTH] IN BLOOD BY AUTOMATED COUNT: 13.4 % (ref 11.5–14.5)
ERYTHROCYTE [DISTWIDTH] IN BLOOD BY AUTOMATED COUNT: 42.9 FL (ref 35–45)
GFR SERPL CREATININE-BSD FRML MDRD: > 90 ML/MIN/1.73M2
GLUCOSE BLD-MCNC: 101 MG/DL (ref 70–108)
HCT VFR BLD CALC: 47.3 % (ref 42–52)
HDLC SERPL-MCNC: 53 MG/DL
HEMOGLOBIN: 14.8 GM/DL (ref 14–18)
IMMATURE GRANS (ABS): 0.09 THOU/MM3 (ref 0–0.07)
IMMATURE GRANULOCYTES: 1 %
LDL CHOLESTEROL CALCULATED: 151 MG/DL
LYMPHOCYTES # BLD: 31.6 %
LYMPHOCYTES ABSOLUTE: 2.8 THOU/MM3 (ref 1–4.8)
MCH RBC QN AUTO: 27.4 PG (ref 26–33)
MCHC RBC AUTO-ENTMCNC: 31.3 GM/DL (ref 32.2–35.5)
MCV RBC AUTO: 87.6 FL (ref 80–94)
MONOCYTES # BLD: 6.4 %
MONOCYTES ABSOLUTE: 0.6 THOU/MM3 (ref 0.4–1.3)
NUCLEATED RED BLOOD CELLS: 0 /100 WBC
PLATELET # BLD: 298 THOU/MM3 (ref 130–400)
PMV BLD AUTO: 10 FL (ref 9.4–12.4)
POTASSIUM SERPL-SCNC: 4.7 MEQ/L (ref 3.5–5.2)
PROSTATE SPECIFIC ANTIGEN: 3.43 NG/ML (ref 0–1)
RBC # BLD: 5.4 MILL/MM3 (ref 4.7–6.1)
SEG NEUTROPHILS: 57.7 %
SEGMENTED NEUTROPHILS ABSOLUTE COUNT: 5.2 THOU/MM3 (ref 1.8–7.7)
SODIUM BLD-SCNC: 140 MEQ/L (ref 135–145)
TOTAL PROTEIN: 7.8 G/DL (ref 6.1–8)
TRIGL SERPL-MCNC: 180 MG/DL (ref 0–199)
WBC # BLD: 9 THOU/MM3 (ref 4.8–10.8)

## 2020-12-30 PROCEDURE — 36415 COLL VENOUS BLD VENIPUNCTURE: CPT

## 2020-12-30 PROCEDURE — G0103 PSA SCREENING: HCPCS

## 2020-12-30 PROCEDURE — 85025 COMPLETE CBC W/AUTO DIFF WBC: CPT

## 2020-12-30 PROCEDURE — 80053 COMPREHEN METABOLIC PANEL: CPT

## 2020-12-30 PROCEDURE — 80061 LIPID PANEL: CPT

## 2021-01-03 NOTE — PROGRESS NOTES
[address] [address]  Dept: [phone]  Dept Fax: [phone]  Loc: [phone]  PROGRESS NOTE      VisitDate: 2020    Mick Gill is a 60 y.o. male who presents today for:     Chief Complaint   Patient presents with   [facility] Annual Exam     Wellness Px    Labs Only     Due for labs    Flu Vaccine     declined    Medication Refill         Subjective:  HPI  Here for wellness. Has history of hypertension, has been stable. He continues to have pain and discomfort related to his foot. Said ongoing intervention with podiatry and continues to have pain and discomfort. He is reluctant to use pain medication. Son was trained on habits changing his activity and day-to-day tasks because of this discomfort.       Past Medical History:   Diagnosis Date    Anxiety     Blood circulation, collateral     Gout     Hypertension     Hypotestosteronism 2013    Osteoarthritis     PONV (postoperative nausea and vomiting)       Past Surgical History:   Procedure Laterality Date    APPENDECTOMY      GASTROCNEMIUS RECESSION Right 2019    RIGHT 1ST MET-TARSAL FUSION, GASTROC RECESSION, N-C FUSION, T-N FUSION, STJ FUSION, BMA performed by Yamini Plunkett DPM at [address] [facility]      ABDOMINAL    OTHER SURGICAL HISTORY      excision of scrotal wall lesion     Family History   Problem Relation Age of Onset    Diabetes Maternal Grandmother     Heart Disease Maternal Grandmother         PACEMAKER    Cancer Maternal Grandmother     Breast Cancer Maternal Grandmother     Cancer Mother         liver    High Blood Pressure Neg Hx     Stroke Neg Hx      Social History     Tobacco Use    Smoking status: Former Smoker     Packs/day: 0.25     Years: 7.00     Pack years: 1.75     Types: Cigarettes     Quit date:      Years since quittin.0    Smokeless tobacco: Never Used   Substance Use Topics    Alcohol use: Yes     Comment: occasional      Current Outpatient Medications   Medication Sig Dispense Refill    lisinopril (PRINIVIL;ZESTRIL) 20 MG tablet Take 1 tablet by mouth daily 90 tablet 3    ibuprofen (ADVIL;MOTRIN) 200 MG tablet Take 800 mg by mouth every 6 hours as needed for Pain        No current facility-administered medications for this visit. Allergies   Allergen Reactions    Amoxil [Amoxicillin] Itching    Lactose Diarrhea    Tape [Adhesive Tape] Itching     Health Maintenance   Topic Date Due    HIV screen  06/28/1971    DTaP/Tdap/Td vaccine (1 - Tdap) 06/28/1975    Diabetes screen  06/28/1996    Shingles Vaccine (1 of 2) 06/28/2006    Flu vaccine (1) 09/01/2020    Potassium monitoring  12/30/2021    Creatinine monitoring  12/30/2021    Colon cancer screen colonoscopy  10/14/2023    Lipid screen  12/30/2025    Hepatitis C screen  Completed    Hepatitis A vaccine  Aged Out    Hepatitis B vaccine  Aged Out    Hib vaccine  Aged Out    Meningococcal (ACWY) vaccine  Aged Out    Pneumococcal 0-64 years Vaccine  Aged Out         Objective:     Physical Exam  Vitals signs reviewed. Constitutional:       General: He is not in acute distress. HENT:      Mouth/Throat:      Pharynx: No oropharyngeal exudate. Eyes:      Conjunctiva/sclera: Conjunctivae normal.   Neck:      Thyroid: No thyromegaly. Comments: No carotid bruits  Cardiovascular:      Rate and Rhythm: Normal rate and regular rhythm. Heart sounds: Normal heart sounds. No murmur. Pulmonary:      Breath sounds: Normal breath sounds. No wheezing or rales. Abdominal:      General: Bowel sounds are normal. There is no distension. Palpations: Abdomen is soft. There is no mass. Tenderness: There is no abdominal tenderness. There is no guarding or rebound. Musculoskeletal: Normal range of motion. General: No tenderness. Lymphadenopathy:      Cervical: No cervical adenopathy.    Skin: General: Skin is dry. Findings: No rash. Neurological:      Mental Status: He is alert and oriented to person, place, and time. Cranial Nerves: No cranial nerve deficit. Deep Tendon Reflexes: Reflexes are normal and symmetric. Musculoskeletal exam revealed some pain tenderness and limitation range of motion of his right foot  /66 (Site: Right Upper Arm)   Pulse 80   Temp 97.2 °F (36.2 °C) (Temporal)   Resp 20   Ht 6' 2\" (1.88 m)   Wt 215 lb (97.5 kg)   BMI 27.60 kg/m²       Impression/Plan:  1. Routine general medical examination at a health care facility    2. Essential hypertension    3. Screening PSA (prostate specific antigen)    4. Chronic foot pain, unspecified laterality      Requested Prescriptions     Signed Prescriptions Disp Refills    lisinopril (PRINIVIL;ZESTRIL) 20 MG tablet 90 tablet 3     Sig: Take 1 tablet by mouth daily     Orders Placed This Encounter   Procedures    Lipid Panel     Standing Status:   Future     Number of Occurrences:   1     Standing Expiration Date:   12/29/2021     Order Specific Question:   Is Patient Fasting?/# of Hours     Answer:   yes/12    Comprehensive Metabolic Panel     Standing Status:   Future     Number of Occurrences:   1     Standing Expiration Date:   12/29/2021    CBC Auto Differential     Standing Status:   Future     Number of Occurrences:   1     Standing Expiration Date:   12/29/2021    Psa screening     Standing Status:   Future     Number of Occurrences:   1     Standing Expiration Date:   12/29/2021     Seen today for wellness visit. Discussed the importance of a healthy life style. Balanced diet, nutrition, physical activity,and injury prevention. Also discussed the importance of up to date immunizations and annual screenings. We discussed the use of medicinal marijuana as a pain treatment.   He is open to exploring this and we will provide him with information for Dr. Rhona Javier    Patient giveneducational materials

## 2021-01-04 ENCOUNTER — HOSPITAL ENCOUNTER (OUTPATIENT)
Dept: PHYSICAL THERAPY | Age: 66
Setting detail: THERAPIES SERIES
Discharge: HOME | End: 2021-01-04
Payer: COMMERCIAL

## 2021-01-04 PROCEDURE — 97110 THERAPEUTIC EXERCISES: CPT

## 2021-01-04 PROCEDURE — 97035 APP MDLTY 1+ULTRASOUND EA 15: CPT

## 2021-01-04 NOTE — PROGRESS NOTES
[address] [facility]  PHYSICAL THERAPY  [x] DAILY NOTE (LAND) [] DAILY NOTE (AQUATIC ) [] PROGRESS NOTE [] DISCHARGE NOTE    [x] OUTPATIENT REHABILITATION CENTER [facility]   [] [place] [address]    [] [facility]   [] Carina Tovar    Date: 2021  Patient Name:  Merly Veliz  : 1955  MRN: [id number]  CSN: [id number]    Referring Practitioner Johnson, Romina Justin DPM   Diagnosis rt knee pain  rt foot reconstruction  pain sural neuritis    Treatment Diagnosis Sp right foot fusion    Date of Evaluation 20    Additional Pertinent History No health issues, nothing preventing from motion , HBP       Functional Outcome Measure Used AM-PAC Basic mobility    Functional Outcome Score 44 (20)  46 (2020)      Insurance: Primary: Payor: Darrel Orozco /  /  / ,   Secondary:    Authorization Information:    Visit # 10 2/10 for progress note   Visits Allowed:     Recertification Date:    Physician Follow-Up: Unknown     Physician Orders: eval and treat   History of Present Illness: On 2019 he underwent a right ankle reconstruction/fusion. Back in  he started to notice that his foot started to breakdown with his arch completely collapsing. Dr. Cabrera Herron initially wanted to do surgery 15 yrs ago, however Nerissa Cortés put it off. He has been recovering for the last 6 months with the first 4 months spent on crutches. Since surgery he struggled more with right knee/leg issues versus his actual foot. He complains more of right knee pain and tightness; he had been advised to start therapy months ago, however he was not ready due to increase in right LE pain. SUBJECTIVE: Saw family Dr last week. He stated he wanted him to cont with PT  He did not address his low back pain or hip. He did have pain relief in the ankle for the rest of the day after US last visit.           TREATMENT   Precautions:    Pain:  4/10 ankle lateral and ant  Joint, worse as he increased Increase right LE strength to 5/5   Increase right ankle DF to 12    Patient to walk with normal push off at a fast pace 10 min  I HEP  Pain less than 2/10 after activity. Patient Education:   []  HEP/Education Completed:continue to work on heel raises off step, ice more frequently.  [employer] Access Code:  []  No new Education completed  [x]  Reviewed Prior HEP      [x]  Patient verbalized and/or demonstrated understanding of education provided. []  Patient unable to verbalize and/or demonstrate understanding of education provided. Will continue education. []  Barriers to learning: none     PLAN: cont to trial modes for pain relief. Treatment Recommendations: Strengthening, Range of Motion, Balance Training, Functional Mobility Training and Gait Training      [x]  Continue with current plan of care. 2 times per week for 5 weeks.   []  Modify plan of care as follows:    []  Hold pending physician visit rec discharge,   Time In 1140   Time out 1225   Timed Code Minutes: 45   Total Treatment Time: 45       Electronically Signed by: Genet Haines

## 2021-01-05 ENCOUNTER — TELEPHONE (OUTPATIENT)
Dept: FAMILY MEDICINE CLINIC | Age: 66
End: 2021-01-05

## 2021-01-05 DIAGNOSIS — R97.20 ELEVATED PSA: Primary | ICD-10-CM

## 2021-01-05 DIAGNOSIS — E78.5 HYPERLIPIDEMIA, UNSPECIFIED HYPERLIPIDEMIA TYPE: ICD-10-CM

## 2021-01-05 NOTE — TELEPHONE ENCOUNTER
----- Message from Phan Vuong MD sent at 1/5/2021  7:02 AM EST -----  Bad cholesterol has increased from last cholesterol, low-fat diet increase activity and repeat lipid panel in 3 months. PSA has jumped significantly from previous, previous level 2 years ago 0.55 current level 3.43.   Recommend appointment with urology

## 2021-01-07 ENCOUNTER — APPOINTMENT (OUTPATIENT)
Dept: PHYSICAL THERAPY | Age: 66
End: 2021-01-07
Payer: COMMERCIAL

## 2021-01-11 ENCOUNTER — HOSPITAL ENCOUNTER (OUTPATIENT)
Dept: PHYSICAL THERAPY | Age: 66
Setting detail: THERAPIES SERIES
Discharge: HOME | End: 2021-01-11
Payer: COMMERCIAL

## 2021-01-11 PROCEDURE — 97110 THERAPEUTIC EXERCISES: CPT

## 2021-01-11 NOTE — PROGRESS NOTES
[address] [facility]  PHYSICAL THERAPY  [x] DAILY NOTE (LAND) [] DAILY NOTE (AQUATIC ) [] PROGRESS NOTE [] DISCHARGE NOTE    [x] OUTPATIENT REHABILITATION CENTER [facility]   [] [place] [address]    [] [facility]   [] Carry Minder    Date: 2021  Patient Name:  Mick Gill  : 1955  MRN: [id number]  CSN: [id number]    Referring Practitioner Johnson, Huyen Sanchez DPM   Diagnosis rt knee pain  rt foot reconstruction  pain sural neuritis    Treatment Diagnosis Sp right foot fusion    Date of Evaluation 20    Additional Pertinent History No health issues, nothing preventing from motion , HBP       Functional Outcome Measure Used -PAC Basic mobility    Functional Outcome Score 44 (20)  46 (2020)      Insurance: Primary: Payor: Enoc Quevedo /  /  / ,   Secondary:    Authorization Information:    Visit # 11, 3/10 for progress note   Visits Allowed:     Recertification Date: 2383   Physician Follow-Up: Unknown     Physician Orders: eval and treat   History of Present Illness: On 2019 he underwent a right ankle reconstruction/fusion. Back in  he started to notice that his foot started to breakdown with his arch completely collapsing. Dr. Joaquín Najera initially wanted to do surgery 15 yrs ago, however Burnie Osler put it off. He has been recovering for the last 6 months with the first 4 months spent on crutches. Since surgery he struggled more with right knee/leg issues versus his actual foot. He complains more of right knee pain and tightness; he had been advised to start therapy months ago, however he was not ready due to increase in right LE pain. SUBJECTIVE: Patient reporting ankle does not seem to get getting any better or worse. Patient reporting that he did try to walk a mile last Tuesday and had more pain following that. Patient reporting pain relief from [address] is only temporary.          TREATMENT   Precautions:    Pain:  3/10 ankle lateral and ant Joint, worse as he increased activity    X in shaded column indicates activity completed today   Exercise/Intervention   Notes   Sports art bike  Seat 9  5 min, level 7  x    Hamstring stretch right only, b gastroc on step, soleus stretch on edge of step  3  15  Sec  x Moderate pull in the ankle    Toe raises on step -- B, with hip internal rotation, external rotation 15 reps   x    Step ups  Right only  For, lateral, retro 6 inch 20x  x B UE hold, moderate use of hands for step taps down. On blue foam  Heel toe, march, squat  20x  x Pain in the right ant hip with high march   Rockerboard  AP only , avoid ML due to fusion, single leg  With UE assist and FWB  20 30 sec balance  No UE assist.    Leg press B, toe raises 130# 15    x   Not performed   Single leg (R) 60# X15   rebounder  Step stance. Right SLS with left toe back x20    R/L foot back   R foot step ups on BOSU (forward/lateral)  15   Increased pain in R ankle   Heel raises off edge of // bars  15      sls right  32 sec              US to right ant lat ankle joint -- long sitting with head of table elevated--  [facility] [place] 1.2 cont  8 min,   Less pain after          Specific Interventions Next Treatment: cont US for pain relief. Activity/Treatment Tolerance:  [x]  Patient tolerated treatment well  []  Patient limited by fatigue  []  Patient limited by pain   []  Patient limited by medical complications  []  Other:     Assessment: Patient reporting more pain throughout session today and did decreased reps to leg press. GOALS:  Patient Goal: be active  Stronger leg and foot. Get in better shape. Short Term Goals:  Time Frame: see long term goals  Long Term Goals:  Time Frame: 5 weeks   Increase right LE strength to 5/5   Increase right ankle DF to 12    Patient to walk with normal push off at a fast pace 10 min  I HEP  Pain less than 2/10 after activity.     Patient Education:   []  HEP/Education Completed:continue to work on heel raises off step, ice more frequently.  [employer] Access Code:  [x]  No new Education completed  []  Reviewed Prior HEP      [x]  Patient verbalized and/or demonstrated understanding of education provided. []  Patient unable to verbalize and/or demonstrate understanding of education provided. Will continue education. []  Barriers to learning: none     PLAN: cont to trial modes for pain relief. Treatment Recommendations: Strengthening, Range of Motion, Balance Training, Functional Mobility Training and Gait Training      [x]  Continue with current plan of care. 2 times per week for 5 weeks.   []  Modify plan of care as follows:    []  Hold pending physician visit rec discharge,   Time In [phone]   Time out 1338   Timed Code Minutes: 36   Total Treatment Time: 36       Electronically Signed by: Naomi Woo

## 2021-01-14 ENCOUNTER — HOSPITAL ENCOUNTER (OUTPATIENT)
Dept: PHYSICAL THERAPY | Age: 66
Setting detail: THERAPIES SERIES
Discharge: HOME | End: 2021-01-14
Payer: COMMERCIAL

## 2021-01-14 PROCEDURE — 97110 THERAPEUTIC EXERCISES: CPT

## 2021-01-14 PROCEDURE — 97035 APP MDLTY 1+ULTRASOUND EA 15: CPT

## 2021-01-14 NOTE — PROGRESS NOTES
[address] [facility]  PHYSICAL THERAPY  [] DAILY NOTE (LAND) [] DAILY NOTE (AQUATIC ) [x] PROGRESS NOTE [] DISCHARGE NOTE    [x] OUTPATIENT REHABILITATION CENTER [facility]   [] [place] [address]    [] [facility]   [] Mei Monaco    Date: 2021  Patient Name:  Estrella James  : 1955  MRN: [id number]  CSN: [id number]    Referring Practitioner Johnson, Rosa Lezama DPM   Diagnosis rt knee pain  rt foot reconstruction  pain sural neuritis    Treatment Diagnosis Sp right foot fusion    Date of Evaluation 20    Additional Pertinent History No health issues, nothing preventing from motion , HBP       Functional Outcome Measure Used -PAC Basic mobility    Functional Outcome Score 44 (20)  46 (2020)  33 ( 2021)      Insurance: Primary: Payor: Gary Hendricks /  /  / ,   Secondary:    Authorization Information:    Visit # 12, 4/10 for progress note   Visits Allowed:     Recertification Date:    Physician Follow-Up: Unknown     Physician Orders: eval and treat   History of Present Illness: On 2019 he underwent a right ankle reconstruction/fusion. Back in  he started to notice that his foot started to breakdown with his arch completely collapsing. Dr. Gerri Monge initially wanted to do surgery 15 yrs ago, however Ritika Miller put it off. He has been recovering for the last 6 months with the first 4 months spent on crutches. Since surgery he struggled more with right knee/leg issues versus his actual foot. He complains more of right knee pain and tightness; he had been advised to start therapy months ago, however he was not ready due to increase in right LE pain. SUBJECTIVE: Patient reporting ankle does not seem to get getting any better or worse. only temporary relief with US. Pain along the lateral ankle. More pain with turning foot inward. Pt returns to Dr Gerri Monge tomorrow. ROM  Unchanged  -- DF 5 to 35 PF    Strength;   Unable to perform single leg toe raise on the right, severe pain in the lateral ankle joint when attempting immediately after US. TREATMENT   Precautions:    Pain:  3/10 ankle lateral and ant  Joint, worse as he increased activity    X in shaded column indicates activity completed today   Exercise/Intervention   Notes   Sports art bike  Seat 9  5 min, level 7  x    Hamstring stretch right only, b gastroc on step, soleus stretch on edge of step  3  10  Sec  x Moderate pull in the ankle    Toe raises on step -- B, with hip neutral,  internal rotation, external rotation 10 reps   x Pain limited  Only 10 reps tolerated,    Step ups  Right only  For, lateral, retro 6 inch 20x  x B UE hold, moderate use of hands for step taps down. On blue foam  Heel toe, march, squat  20x  x Pain in the right ant hip with high march   Rockerboard  AP only , avoid ML due to fusion, single leg  With UE assist and FWB  20 30 sec balance  No UE assist.    Leg press B, toe raises 130# 15    x   Not performed   Single leg (R) 60# X15   rebounder  Step stance. Right SLS with left toe back x20    R/L foot back   R foot step ups on BOSU (forward/lateral)  15   Increased pain in R ankle          sls right  32 sec              US to right ant lat ankle joint -- long sitting with head of table elevated--  richmar 1.2 cont  8 min x Did prior to stretches         Specific Interventions Next Treatment: cont 1 more visit, review HEP and check for any long term results with US. Activity/Treatment Tolerance:  []  Patient tolerated treatment well  []  Patient limited by fatigue  [x]  Patient limited by pain   []  Patient limited by medical complications  []  Other:     Assessment: temporary relief with US,  Still painful with all activities of the right LE. He is unable to walk through [employer] without moderate increase in pain and continues to express frustration    GOALS:  Patient Goal: be active  Stronger leg and foot. Get in better shape.     Short Term Goals: Time Frame: see long term goals  Long Term Goals:  Time Frame: 5 weeks   Increase right LE strength to 5/5  Not met   PF 2+-- moderate increase in lateral joint pain, DF 3   Increase right ankle DF to 12  Not met    Patient to walk with normal push off at a fast pace 10 min-- not able to tolerate fast walk , moderate increase in pain ,   I HEP  Ongoing   Pain less than 2/10 after activity. -- not met  2/10 with no activity, 4 after activity , 8/10 during leg press. Patient Education:   []  HEP/Education Completed:continue to work on heel raises off step,talk to Dr about other forms of pain releif,     [employer] Access Code:  [x]  No new Education completed  []  Reviewed Prior HEP      [x]  Patient verbalized and/or demonstrated understanding of education provided. []  Patient unable to verbalize and/or demonstrate understanding of education provided. Will continue education. []  Barriers to learning: none     PLAN: cont to trial modalities for pain relief. Spoke with PT who performs dry needling, will not be very effective due to pain being more along the lateral joint line, and not in a trigger point were dry needling is done. Treatment Recommendations: Strengthening, Range of Motion, Balance Training, Functional Mobility Training and Gait Training      [x]  Continue with current plan of care. Trial US 1 more visit, if no relief , then rec cont with HEP and look for other medical means for pain reduction.     []  Modify plan of care as follows:    []  Hold pending physician    Time In [phone]   Time out 1346   Timed Code Minutes: 45   Total Treatment Time: 45       Electronically Signed by: Nicolas Neal

## 2021-01-18 ENCOUNTER — HOSPITAL ENCOUNTER (OUTPATIENT)
Dept: PHYSICAL THERAPY | Age: 66
Setting detail: THERAPIES SERIES
Discharge: HOME | End: 2021-01-18
Payer: COMMERCIAL

## 2021-01-18 PROCEDURE — 97035 APP MDLTY 1+ULTRASOUND EA 15: CPT

## 2021-01-18 PROCEDURE — 97110 THERAPEUTIC EXERCISES: CPT

## 2021-01-18 NOTE — DISCHARGE SUMMARY
joint with mobs. TREATMENT   Precautions:    Pain:  3/10 ankle lateral and ant  Joint, worse as he increased activity    X in shaded column indicates activity completed today   Exercise/Intervention   Notes   Sports art bike  Seat 9  6 min, level 7  x    Hamstring stretch right only, b gastroc on step, soleus stretch on edge of step  3  10  Sec  x Moderate pull in the ankle    Toe raises on step -- B, with hip neutral,  internal rotation, external rotation 10 reps   x Pain limited  Only 10 reps tolerated,    Step ups  Right only  For, lateral, retro 6 inch 10x  x B UE hold, moderate use of hands for step taps down. On blue foam  Heel toe, march, squat  20x   Pain in the right ant hip with high march   Rockerboard  AP only , avoid ML due to fusion, single leg  With UE assist and FWB  20 30 sec balance  No UE assist.    Leg press B, toe raises 130# 15, 10   x   Not performed   Single leg (R) 60# X15   rebounder  Step stance. Right SLS with left toe back x20    R/L foot back   R foot step ups on BOSU (forward/lateral)  15   Increased pain in R ankle          sls right  32 sec longest , after 4 trial x  x    Joint mobs  AP glides to the right ankle 3 min   X     US to right ant lat ankle joint -- long sitting with head of table elevated--  richmar 1.2 cont  8 min x        Specific Interventions Next Treatment:discharge   Activity/Treatment Tolerance:  []  Patient tolerated treatment well  []  Patient limited by fatigue  [x]  Patient limited by pain   []  Patient limited by medical complications  []  Other:     Assessment: temporary relief with US,  Still painful with all activities of the right LE. He is unable to walk through [place] without moderate increase in pain and continues to express frustration    GOALS:  Patient Goal: be active  Stronger leg and foot. Get in better shape.     Short Term Goals:  Time Frame: see long term goals  Long Term Goals:  Time Frame: 5 weeks   Increase right LE strength to 5/5  Not met   PF 3- with attempting toe raise, 4+in NWB -- moderate increase in lateral joint pain DF 4+/5    Increase right ankle DF to 12  Not met    Patient to walk with normal push off at a fast pace 10 min- inconsistent with gait, I HEP  Ongoing   Pain less than 2/10 after activity. -- not met  2/10 with no activity, 4 after activity , 8/10 during leg press. Patient Education:   []  HEP/Education Completed:continue to work on heel raises off step,talk to Dr about other forms of pain releif,     [employer] Access Code:  [x]  No new Education completed  []  Reviewed Prior HEP      [x]  Patient verbalized and/or demonstrated understanding of education provided. []  Patient unable to verbalize and/or demonstrate understanding of education provided. Will continue education.   []  Barriers to learning: none     PLAN:   Discharge --cont HEP    Time In 1302   Time out 1346   Timed Code Minutes: 45   Total Treatment Time: 45       Electronically Signed by: Maria Ines Dumas

## 2021-02-02 ENCOUNTER — OFFICE VISIT (OUTPATIENT)
Dept: UROLOGY | Age: 66
End: 2021-02-02
Payer: COMMERCIAL

## 2021-02-02 VITALS — HEIGHT: 75 IN | WEIGHT: 213 LBS | BODY MASS INDEX: 26.49 KG/M2 | TEMPERATURE: 97.5 F

## 2021-02-02 DIAGNOSIS — R97.20 ELEVATED PSA: Primary | ICD-10-CM

## 2021-02-02 PROCEDURE — 99202 OFFICE O/P NEW SF 15 MIN: CPT | Performed by: UROLOGY

## 2021-02-02 ASSESSMENT — ENCOUNTER SYMPTOMS
COLOR CHANGE: 0
SHORTNESS OF BREATH: 0
EYE PAIN: 0
VOMITING: 0
CHEST TIGHTNESS: 0
EYE REDNESS: 0
ABDOMINAL PAIN: 0
FACIAL SWELLING: 0

## 2021-02-02 NOTE — PROGRESS NOTES
[place] [address] [address] [address]  Dept: [phone]  Loc: [phone]  Visit Date: 2021    Subjective:      Patient ID: Alfonso Stevens 60 y.o. male 1955    Chief Complaint   Patient presents with    Consultation     New Patient     Elevated PSA       HPI 61-year-old white male comes in today for a PSA of 3.4 NG/mL. He has had multiple PSAs over the last 5 years. Were all less than 1.  2 years ago was 0.5 NG/mL. There is no family history of prostate carcinoma. He denies any new voiding symptoms. His IPSS is 1. His quality of life is 0 which is delighted with the voiding pattern.   Past Medical History:   Diagnosis Date    Anxiety     Blood circulation, collateral     Gout     Hypertension     Hypotestosteronism 2013    Osteoarthritis     PONV (postoperative nausea and vomiting)        Social History     Socioeconomic History    Marital status:      Spouse name: Not on file    Number of children: Not on file    Years of education: Not on file    Highest education level: Not on file   Occupational History    Not on file   Social Needs    Financial resource strain: Not on file    Food insecurity     Worry: Not on file     Inability: Not on file    Transportation needs     Medical: Not on file     Non-medical: Not on file   Tobacco Use    Smoking status: Former Smoker     Packs/day: 0.25     Years: 7.00     Pack years: 1.75     Types: Cigarettes     Quit date:      Years since quittin.1    Smokeless tobacco: Never Used   Substance and Sexual Activity    Alcohol use: Yes     Comment: occasional    Drug use: Yes     Frequency: 7.0 times per week     Types: Marijuana    Sexual activity: Not on file   Lifestyle    Physical activity     Days per week: Not on file     Minutes per session: Not on file    Stress: Not on file   Relationships    Social connections Talks on phone: Not on file     Gets together: Not on file     Attends Orthodox service: Not on file     Active member of club or organization: Not on file     Attends meetings of clubs or organizations: Not on file     Relationship status: Not on file    Intimate partner violence     Fear of current or ex partner: Not on file     Emotionally abused: Not on file     Physically abused: Not on file     Forced sexual activity: Not on file   Other Topics Concern    Not on file   Social History Narrative    Not on file       Family History   Problem Relation Age of Onset    Diabetes Maternal Grandmother     Heart Disease Maternal Grandmother         PACEMAKER    Cancer Maternal Grandmother     Breast Cancer Maternal Grandmother     Cancer Mother         liver    High Blood Pressure Neg Hx     Stroke Neg Hx        Past Surgical History:   Procedure Laterality Date    APPENDECTOMY      GASTROCNEMIUS RECESSION Right 12/27/2019    RIGHT 1ST MET-TARSAL FUSION, GASTROC RECESSION, N-C FUSION, T-N FUSION, STJ FUSION, BMA performed by CHEIKH Turner at [address] [place] [address] ([facility])  2004    ABDOMINAL    OTHER SURGICAL HISTORY  2015    excision of scrotal wall lesion       Allergies   Allergen Reactions    Amoxil [Amoxicillin] Itching    Lactose Diarrhea    Tape Gavino Eth Tape] Itching         Current Outpatient Medications:     lisinopril (PRINIVIL;ZESTRIL) 20 MG tablet, Take 1 tablet by mouth daily, Disp: 90 tablet, Rfl: 3    ibuprofen (ADVIL;MOTRIN) 200 MG tablet, Take 800 mg by mouth every 6 hours as needed for Pain , Disp: , Rfl:     Review of Systems   Constitutional: Negative for chills and fever. HENT: Negative for ear pain and facial swelling. Eyes: Negative for pain and redness. Respiratory: Negative for chest tightness and shortness of breath. Cardiovascular: Negative for chest pain and leg swelling. Gastrointestinal: Negative for abdominal pain and vomiting. Endocrine: Negative for cold intolerance and heat intolerance. Genitourinary: Negative for difficulty urinating, dysuria, frequency, hematuria and urgency. Musculoskeletal: Negative for neck pain and neck stiffness. Skin: Negative for color change and rash. Allergic/Immunologic: Negative for environmental allergies and food allergies. Neurological: Negative for dizziness and light-headedness. Hematological: Does not bruise/bleed easily. Temp 97.5 °F (36.4 °C)   Ht 6' 3\" (1.905 m)   Wt 213 lb (96.6 kg)   BMI 26.62 kg/m²     Objective:   Physical Exam  Vitals signs reviewed. Constitutional:       Appearance: He is well-developed. HENT:      Head: Normocephalic and atraumatic. Right Ear: External ear normal.      Left Ear: External ear normal.   Eyes:      Extraocular Movements: Extraocular movements intact. Conjunctiva/sclera: Conjunctivae normal.      Pupils: Pupils are equal, round, and reactive to light. Abdominal:      General: Bowel sounds are normal. There is no distension. Palpations: Abdomen is soft. Tenderness: There is no abdominal tenderness. Hernia: No hernia is present. There is no hernia in the left inguinal area or right inguinal area. Genitourinary:     Penis: Normal.       Testes: Normal.      Epididymis:      Right: Normal.      Left: Normal.      Prostate: Not tender and no nodules present. Rectum: No mass or tenderness. Normal anal tone. Lymphadenopathy:      Lower Body: No right inguinal adenopathy. No left inguinal adenopathy. Skin:     General: Skin is warm and dry. Neurological:      Mental Status: He is alert and oriented to person, place, and time. Psychiatric:         Mood and Affect: Mood normal.         Behavior: Behavior normal.         Assessment:       Diagnosis Orders   1. Elevated PSA  PSA, Prostatic Specific Antigen       Mr. Ha Lou today in consultation for Elevated PSA [R97.20]. Depending on the laboratory and assay done for PSA will determine the normal parameters. In general, I use age-adjusted PSA values. For a man in his 62s, the upper limit of normal is 4.5 NG/mL. Other laboratory data use a value of anything greater than 1.0 NG/mL with the caveat of a normal/abnormal REG. Finally, his PSA velocity has changed over the last 2 years. Plan:        I am going to have him repeat his PSA in 6 months. Certainly if the PSA is above 4.5 NG/mL then along with the change in the PSA over 2 years, he will need to consider ultrasound-guided prostate biopsies.

## 2021-03-24 ENCOUNTER — OFFICE VISIT (OUTPATIENT)
Dept: PHYSICAL MEDICINE AND REHAB | Age: 66
End: 2021-03-24
Payer: COMMERCIAL

## 2021-03-24 VITALS
WEIGHT: 227 LBS | BODY MASS INDEX: 28.23 KG/M2 | SYSTOLIC BLOOD PRESSURE: 152 MMHG | HEIGHT: 75 IN | DIASTOLIC BLOOD PRESSURE: 80 MMHG

## 2021-03-24 DIAGNOSIS — M25.571 CHRONIC PAIN OF RIGHT ANKLE: ICD-10-CM

## 2021-03-24 DIAGNOSIS — G90.521 COMPLEX REGIONAL PAIN SYNDROME TYPE 1 OF RIGHT LOWER EXTREMITY: Primary | ICD-10-CM

## 2021-03-24 DIAGNOSIS — M79.2 NEUROPATHIC PAIN: ICD-10-CM

## 2021-03-24 DIAGNOSIS — G89.29 CHRONIC PAIN OF RIGHT ANKLE: ICD-10-CM

## 2021-03-24 DIAGNOSIS — G89.29 OTHER CHRONIC PAIN: ICD-10-CM

## 2021-03-24 PROCEDURE — 99204 OFFICE O/P NEW MOD 45 MIN: CPT | Performed by: ANESTHESIOLOGY

## 2021-03-24 NOTE — PROGRESS NOTES
chronicity, Right foot pain, Arthralgia of hip, unspecified laterality.       COMPARISON: 12/3/2019       TECHNIQUE: 4 views of the right foot.        FINDINGS:       There are 2 large screws fusing the talus and calcaneus. Moderate staples fuse the talonavicular joint and two navicular cuneiform joints and the first tarsometatarsal joint. There is also a screw and long K wire in these regions.       Mild spurring at the first MTP joint. The IP joint of the great toe is mildly narrowed.       No acute fracture.        There is mild swelling near the first MTP joint.        There is patchy demineralization. Moderate arthropathy throughout the midfoot.               Impression       Extensive previous surgery as above.          Past Medical History:   Diagnosis Date    Anxiety     Blood circulation, collateral     Gout     Hypertension     Hypotestosteronism 5/24/2013    Osteoarthritis     PONV (postoperative nausea and vomiting)        Past Surgical History:   Procedure Laterality Date    APPENDECTOMY      GASTROCNEMIUS RECESSION Right 12/27/2019    RIGHT 1ST MET-TARSAL FUSION, GASTROC RECESSION, N-C FUSION, T-N FUSION, STJ FUSION, BMA performed by CHEIKH Turner at [address] [place] [address] ([facility])  2004    ABDOMINAL    OTHER SURGICAL HISTORY  2015    excision of scrotal wall lesion       Family History   Problem Relation Age of Onset    Diabetes Maternal Grandmother     Heart Disease Maternal Grandmother         PACEMAKER    Cancer Maternal Grandmother     Breast Cancer Maternal Grandmother     Cancer Mother         liver    High Blood Pressure Neg Hx     Stroke Neg Hx        Social History     Socioeconomic History    Marital status:      Spouse name: Not on file    Number of children: Not on file    Years of education: Not on file    Highest education level: Not on file   Occupational History    Not on file   Social Needs    Financial resource strain: Not on file    Food insecurity Worry: Not on file     Inability: Not on file    Transportation needs     Medical: Not on file     Non-medical: Not on file   Tobacco Use    Smoking status: Former Smoker     Packs/day: 0.25     Years: 7.00     Pack years: 1.75     Types: Cigarettes     Quit date: 37     Years since quittin.2    Smokeless tobacco: Never Used   Substance and Sexual Activity    Alcohol use: Yes     Comment: occasional    Drug use: Yes     Frequency: 7.0 times per week     Types: Marijuana     Comment: medical marijuana    Sexual activity: Not on file   Lifestyle    Physical activity     Days per week: Not on file     Minutes per session: Not on file    Stress: Not on file   Relationships    Social connections     Talks on phone: Not on file     Gets together: Not on file     Attends [employer] service: Not on file     Active member of club or organization: Not on file     Attends meetings of clubs or organizations: Not on file     Relationship status: Not on file    Intimate partner violence     Fear of current or ex partner: Not on file     Emotionally abused: Not on file     Physically abused: Not on file     Forced sexual activity: Not on file   Other Topics Concern    Not on file   Social History Narrative    Not on file       Medications & Allergies:   Current Outpatient Medications   Medication Instructions    ibuprofen (ADVIL;MOTRIN) 800 mg, Oral, EVERY 6 HOURS PRN    lisinopril (PRINIVIL;ZESTRIL) 20 mg, Oral, DAILY       Allergies   Allergen Reactions    Amoxil [Amoxicillin] Itching    Lactose Diarrhea    Tape Tc Limber Tape] Itching       Review of Systems:   Constitutional: negative for weight changes or fevers  Genitourinary: negative for bowel/bladder incontinence   Musculoskeletal: positive for right foot/ankle pain  Neurological:  Positive for right leg weakness and numbness/tingling  Behavioral/Psych: negative for anxiety/depression   All other systems reviewed and are negative    Objective: sympathetically mediated pain. Plan: The following treatment recommendations and plan were discussed in detail with Jim Zelaya. Imaging:   I have reviewed patients imaging of XR right foot and results were discussed with patient today. Analgesics:   Patient is taking Acetaminophen. Patient informed that the maximum amount of acetaminophen taken on a regular basis should only be 4000 mg per day. Patient is taking Ibuprofen. Patient is advised to take as prescribed and not take on an empty stomach. Adjuvants:   None    Interventions: In the presence of right lower limb CRPS related pain and sympathetic mediated pain, we will proceed with a right lumbar sympathetic block (LSB). Patient is in agreement and wants to proceed with the injection. Anticoagulation/NPO Recommendations:   Patient will need to hold Ibuprofen x 2 days prior to the procedure. Patient will need to be NPO x 8 hours prior to the procedure. We will start an IV prior to the procedure. Multidisciplinary Pain Management:   In the presence of complex, chronic, and multi-factorial pain, the importance of a multidisciplinary approach to pain management in the patients management regimen was emphasized and discussed in great detail.      Referrals:  None    Prescriptions Written This Visit:   None    Follow-up: For right LSB      Anthony Andrews, DO  Interventional Pain Management/PM&R   [place]

## 2021-03-24 NOTE — PATIENT INSTRUCTIONS
The following treatment recommendations and plan were discussed in detail with Juan Biswas. Imaging:   I have reviewed patients imaging of XR right foot and results were discussed with patient today. Analgesics:   Patient is taking Acetaminophen. Patient informed that the maximum amount of acetaminophen taken on a regular basis should only be 4000 mg per day. Patient is taking Ibuprofen. Patient is advised to take as prescribed and not take on an empty stomach. Adjuvants:   None    Interventions: In the presence of right lower limb CRPS related pain and sympathetic mediated pain, we will proceed with a right lumbar sympathetic block (LSB). Patient is in agreement and wants to proceed with the injection. Anticoagulation/NPO Recommendations:   Patient will need to hold Ibuprofen x 2 days prior to the procedure. Patient will need to be NPO x 8 hours prior to the procedure. We will start an IV prior to the procedure. Multidisciplinary Pain Management:   In the presence of complex, chronic, and multi-factorial pain, the importance of a multidisciplinary approach to pain management in the patients management regimen was emphasized and discussed in great detail.      Referrals:  None    Prescriptions Written This Visit:   None    Follow-up: For right LSB

## 2021-03-25 ENCOUNTER — TELEPHONE (OUTPATIENT)
Dept: PHYSICAL MEDICINE AND REHAB | Age: 66
End: 2021-03-25

## 2021-03-25 NOTE — TELEPHONE ENCOUNTER
I do not really have much to offer as patient was not interested in even medication management.       Marion Ip, DO  Interventional Pain Management/PM&R   [place]

## 2021-03-25 NOTE — TELEPHONE ENCOUNTER
Pt states he changed his mind and no longer wants the procedures that were ordered on 3/24/2021. Asking if there's an alternative that you could recommend.     Please advise

## 2021-03-26 NOTE — TELEPHONE ENCOUNTER
Returned call to patient. A man answered the phone and yelled \"what! \" I paused and as I started to state who I am, he yelled again, \"what! \" and he hung up. I then checked the number and called back a couple of minutes later. The man anwered again, \"what! \" I stated I'm sorry, I may have the wrong number, and the man yelled, \"I think you do! \" and he hung up again.

## 2021-04-12 ENCOUNTER — TELEPHONE (OUTPATIENT)
Dept: PHYSICAL MEDICINE AND REHAB | Age: 66
End: 2021-04-12

## 2021-04-12 NOTE — TELEPHONE ENCOUNTER
Pt. Contacted to complete Covid screening questions. States that he called less than 24 hours after he scheduled the procedure to cancel. I did inform him, and apologized to him that the surgery schedulers did not get that message. States he wants a \"fix\" to his problem and does not think this is the answer. Offered to keep follow up appointment to discuss options with Dr. Teresa Han. Declined, states he wants to follow up with Dr. Tigre Holbrook, Podiatrist to see if this is the direction he wants him to continue.

## 2021-06-09 ENCOUNTER — OFFICE VISIT (OUTPATIENT)
Dept: FAMILY MEDICINE CLINIC | Age: 66
End: 2021-06-09
Payer: COMMERCIAL

## 2021-06-09 VITALS
SYSTOLIC BLOOD PRESSURE: 126 MMHG | BODY MASS INDEX: 26.24 KG/M2 | WEIGHT: 211 LBS | DIASTOLIC BLOOD PRESSURE: 76 MMHG | HEART RATE: 104 BPM | TEMPERATURE: 98.4 F | RESPIRATION RATE: 20 BRPM | HEIGHT: 75 IN

## 2021-06-09 DIAGNOSIS — M70.32 BURSITIS OF LEFT ELBOW, UNSPECIFIED BURSA: ICD-10-CM

## 2021-06-09 DIAGNOSIS — M10.9 ACUTE GOUT OF LEFT ELBOW, UNSPECIFIED CAUSE: ICD-10-CM

## 2021-06-09 DIAGNOSIS — G89.29 CHRONIC FOOT PAIN, UNSPECIFIED LATERALITY: Primary | ICD-10-CM

## 2021-06-09 DIAGNOSIS — M79.673 CHRONIC FOOT PAIN, UNSPECIFIED LATERALITY: Primary | ICD-10-CM

## 2021-06-09 DIAGNOSIS — F43.21 SITUATIONAL DEPRESSION: ICD-10-CM

## 2021-06-09 PROCEDURE — 99214 OFFICE O/P EST MOD 30 MIN: CPT | Performed by: NURSE PRACTITIONER

## 2021-06-09 PROCEDURE — 96372 THER/PROPH/DIAG INJ SC/IM: CPT | Performed by: NURSE PRACTITIONER

## 2021-06-09 RX ORDER — PREDNISONE 10 MG/1
TABLET ORAL
Qty: 30 TABLET | Refills: 0 | Status: SHIPPED | OUTPATIENT
Start: 2021-06-09 | End: 2021-06-19

## 2021-06-09 RX ORDER — CEPHALEXIN 500 MG/1
500 CAPSULE ORAL 4 TIMES DAILY
Qty: 40 CAPSULE | Refills: 0 | Status: SHIPPED | OUTPATIENT
Start: 2021-06-09 | End: 2021-06-19

## 2021-06-09 RX ORDER — METHYLPREDNISOLONE ACETATE 80 MG/ML
160 INJECTION, SUSPENSION INTRA-ARTICULAR; INTRALESIONAL; INTRAMUSCULAR; SOFT TISSUE ONCE
Status: COMPLETED | OUTPATIENT
Start: 2021-06-09 | End: 2021-06-09

## 2021-06-09 RX ADMIN — METHYLPREDNISOLONE ACETATE 160 MG: 80 INJECTION, SUSPENSION INTRA-ARTICULAR; INTRALESIONAL; INTRAMUSCULAR; SOFT TISSUE at 09:17

## 2021-06-09 SDOH — ECONOMIC STABILITY: FOOD INSECURITY: WITHIN THE PAST 12 MONTHS, YOU WORRIED THAT YOUR FOOD WOULD RUN OUT BEFORE YOU GOT MONEY TO BUY MORE.: NEVER TRUE

## 2021-06-09 SDOH — ECONOMIC STABILITY: FOOD INSECURITY: WITHIN THE PAST 12 MONTHS, THE FOOD YOU BOUGHT JUST DIDN'T LAST AND YOU DIDN'T HAVE MONEY TO GET MORE.: NEVER TRUE

## 2021-06-09 ASSESSMENT — PATIENT HEALTH QUESTIONNAIRE - PHQ9
2. FEELING DOWN, DEPRESSED OR HOPELESS: 3
SUM OF ALL RESPONSES TO PHQ QUESTIONS 1-9: 15
4. FEELING TIRED OR HAVING LITTLE ENERGY: 3
5. POOR APPETITE OR OVEREATING: 3
8. MOVING OR SPEAKING SO SLOWLY THAT OTHER PEOPLE COULD HAVE NOTICED. OR THE OPPOSITE, BEING SO FIGETY OR RESTLESS THAT YOU HAVE BEEN MOVING AROUND A LOT MORE THAN USUAL: 0
1. LITTLE INTEREST OR PLEASURE IN DOING THINGS: 3
7. TROUBLE CONCENTRATING ON THINGS, SUCH AS READING THE NEWSPAPER OR WATCHING TELEVISION: 0
10. IF YOU CHECKED OFF ANY PROBLEMS, HOW DIFFICULT HAVE THESE PROBLEMS MADE IT FOR YOU TO DO YOUR WORK, TAKE CARE OF THINGS AT HOME, OR GET ALONG WITH OTHER PEOPLE: 0
SUM OF ALL RESPONSES TO PHQ QUESTIONS 1-9: 15
9. THOUGHTS THAT YOU WOULD BE BETTER OFF DEAD, OR OF HURTING YOURSELF: 0
3. TROUBLE FALLING OR STAYING ASLEEP: 3
SUM OF ALL RESPONSES TO PHQ QUESTIONS 1-9: 15
SUM OF ALL RESPONSES TO PHQ9 QUESTIONS 1 & 2: 6
6. FEELING BAD ABOUT YOURSELF - OR THAT YOU ARE A FAILURE OR HAVE LET YOURSELF OR YOUR FAMILY DOWN: 0

## 2021-06-09 ASSESSMENT — SOCIAL DETERMINANTS OF HEALTH (SDOH): HOW HARD IS IT FOR YOU TO PAY FOR THE VERY BASICS LIKE FOOD, HOUSING, MEDICAL CARE, AND HEATING?: SOMEWHAT HARD

## 2021-06-09 ASSESSMENT — COLUMBIA-SUICIDE SEVERITY RATING SCALE - C-SSRS
2. HAVE YOU ACTUALLY HAD ANY THOUGHTS OF KILLING YOURSELF?: NO
6. HAVE YOU EVER DONE ANYTHING, STARTED TO DO ANYTHING, OR PREPARED TO DO ANYTHING TO END YOUR LIFE?: NO
1. WITHIN THE PAST MONTH, HAVE YOU WISHED YOU WERE DEAD OR WISHED YOU COULD GO TO SLEEP AND NOT WAKE UP?: NO

## 2021-06-09 NOTE — PATIENT INSTRUCTIONS
Patient Education        Bursitis of the Elbow: Care Instructions  Your Care Instructions  Bursitis is pain and swelling of the bursae. These are sacs of fluid that help your joints move smoothly. Olecranon bursitis is a type of bursitis that affects the back of the elbow. This is sometimes called Franco elbow because the bump that develops looks like the cartoon character Franco's elbow. Injury, overuse, or prolonged pressure on your elbow can cause this form of bursitis. Sometimes it happens when people have arthritis. It also can occur for unknown reasons. Treatment may include draining fluid from the bursa with a needle. If your doctor thought there was infection, he or she may have prescribed antibiotics. You also may get shots of medicine into the bursa to help the swelling go down. Your elbow should get better in a few days or weeks. Follow-up care is a key part of your treatment and safety. Be sure to make and go to all appointments, and call your doctor if you are having problems. It's also a good idea to know your test results and keep a list of the medicines you take. How can you care for yourself at home? · Take pain medicines exactly as directed. ? If the doctor gave you a prescription medicine for pain, take it as prescribed. ? If you are not taking a prescription pain medicine, ask your doctor if you can take an over-the-counter medicine. ? Do not take two or more pain medicines at the same time unless the doctor told you to. Many pain medicines have acetaminophen, which is Tylenol. Too much acetaminophen (Tylenol) can be harmful. · If your doctor prescribed antibiotics, take them as directed. Do not stop taking them just because you feel better. You need to take the full course of antibiotics. · If your doctor gave you a sling, an elastic bandage, or a compression sleeve, wear it exactly as instructed. · Put ice or a cold pack on your elbow for 10 to 20 minutes at a time.  Try to do this every 1 to 2 hours for the next 3 days (when you are awake) or until the swelling goes down. Put a thin cloth between the ice and your skin. · After 3 days, you can try heat, or alternate heat and ice. · Rest your elbow. Try to stop or reduce any activity that causes pain. · Wear elbow pads during physical activity to prevent injury. · Do not lean your elbows on tables or armrests. When should you call for help? Call your doctor now or seek immediate medical care if:    · You have new or worse symptoms of infection, such as:  ? Increased pain, swelling, warmth, or redness. ? Red streaks leading from the area. ? Pus draining from the area. ? A fever. Watch closely for changes in your health, and be sure to contact your doctor if:    · You do not get better as expected. Where can you learn more? Go to https://Deutsche Startupspepiceweb.StrikeIron. org and sign in to your [employer] account. Enter  in the XAircraft box to learn more about \"Bursitis of the Elbow: Care Instructions. \"     If you do not have an account, please click on the \"Sign Up Now\" link. Current as of: November 16, 2020               Content Version: 12.8  © 2006-2021 mention. Care instructions adapted under license by [facility] ([facility]). If you have questions about a medical condition or this instruction, always ask your healthcare professional. [place] [address] any warranty or liability for your use of this information. Patient Education        Elbow Bursitis: Exercises  Introduction  Here are some examples of exercises for you to try. The exercises may be suggested for a condition or for rehabilitation. Start each exercise slowly. Ease off the exercises if you start to have pain. You will be told when to start these exercises and which ones will work best for you. How to do the exercises  Elbow flexion stretch   1. Lift the arm that bothers you, and bend the elbow.  Your palm should face toward you. 2. With your other hand, gently push on the back of your affected forearm. Press your hand toward your shoulder until you feel a stretch in the back of your upper arm. 3. Hold for at least 15 to 30 seconds. 4. Repeat 2 to 4 times. Elbow extension stretch   1. Extend your affected arm in front of you with your palm facing away from you. 2. Bend back your wrist, pointing your hand up toward the ceiling. 3. With your other hand, gently bend your wrist farther until you feel a mild to moderate stretch in your forearm. 4. Hold for at least 15 to 30 seconds. 5. Repeat 2 to 4 times. 6. Repeat steps 1 through 5. But this time extend your affected arm in front of you with your palm facing up. Then bend back your wrist, pointing your hand toward the floor. Pronation and supination stretch   1. Keep your affected elbow at your side, bent at about 90 degrees. Grasp a pen, pencil, or stick, and wrap your hand around it. If you don't have something to hold on to, make a fist instead. 2. Slowly turn your forearm as far as you can back and forth in each direction. Your hand should face up and then down. 3. Hold each position for about 6 seconds. 4. Relax for up to 10 seconds between repetitions. 5. Repeat 8 to 12 times. Hand flips   1. While seated, place your affected forearm on your thigh. Your palm should face down. 2. Flip your hand over so the back of your hand rests on your thigh and your palm is up. Alternate between palm up and palm down while keeping your forearm on your thigh. 3. Repeat 8 to 12 times. Follow-up care is a key part of your treatment and safety. Be sure to make and go to all appointments, and call your doctor if you are having problems. It's also a good idea to know your test results and keep a list of the medicines you take. Where can you learn more? Go to https://chmaicoleb.LearnShark. org and sign in to your LOFT[employer] account.  Enter X779 in the [address] wheat bran, and wheat germ. · Limit very high-purine foods, including:  ? Organ meats, such as liver, kidneys, sweetbreads, and brains. ? Meats, including pierce, beef, pork, and lamb. ? Game meats and any other meats in large amounts. ? Anchovies, sardines, herring, mackerel, and scallops. ? Gravy. ? Beer. Where can you learn more? Go to https://Pluralsight.SetPoint Medical. org and sign in to your [employer] account. Enter F448 in the Kueski box to learn more about \"Purine-Restricted Diet: Care Instructions. \"     If you do not have an account, please click on the \"Sign Up Now\" link. Current as of: December 17, 2020               Content Version: 12.8  © 1987-1644 ev-social. Care instructions adapted under license by [facility] ([facility]). If you have questions about a medical condition or this instruction, always ask your healthcare professional. [place] [address] any warranty or liability for your use of this information. Patient Education        Gout: Care Instructions  Overview     Gout is a form of arthritis caused by a buildup of uric acid crystals in a joint. It causes sudden attacks of pain, swelling, redness, and stiffness, usually in one joint, especially the big toe. Gout usually comes on without a cause. But it can be brought on by drinking alcohol (especially beer), eating or drinking things made with high-fructose corn syrup, or eating seafood or red meat. Taking certain medicines, such as diuretics, can also trigger an attack of gout. Taking your medicines as prescribed and following up with your doctor regularly can help you avoid gout attacks in the future. Follow-up care is a key part of your treatment and safety. Be sure to make and go to all appointments, and call your doctor if you are having problems. It's also a good idea to know your test results and keep a list of the medicines you take. How can you care for yourself at home?   · If the joint is swollen, put ice or a cold pack on the area for 10 to 20 minutes at a time. Put a thin cloth between the ice and your skin. · Prop up the sore limb on a pillow when you ice it or anytime you sit or lie down during the next 3 days. Try to keep it above the level of your heart. This can help reduce swelling. · Rest sore joints. Avoid activities that put weight or strain on the joints for a few days. Take short rest breaks from your regular activities during the day. · Take your medicines exactly as prescribed. Call your doctor if you think you are having a problem with your medicine. · Take pain medicines exactly as directed. ? If the doctor gave you a prescription medicine for pain, take it as prescribed. ? If you are not taking a prescription pain medicine, ask your doctor if you can take an over-the-counter medicine. · Eat less seafood and red meat. · Avoid foods or drinks that are made with high-fructose corn syrup. · Check with your doctor before drinking alcohol. · Losing weight, if you are overweight, may help reduce attacks of gout. But do not go on a diet that causes rapid weight loss. Losing a lot of weight in a short amount of time can cause a gout attack. When should you call for help? Call your doctor now or seek immediate medical care if:    · You have a fever.     · The joint is so painful you cannot use it.     · You have sudden, unexplained swelling, redness, warmth, or severe pain in one or more joints. Watch closely for changes in your health, and be sure to contact your doctor if:    · You have joint pain.     · Your symptoms get worse or are not improving after 2 or 3 days. Where can you learn more? Go to https://TapBlazejenStockezy.AppSlingr. org and sign in to your [employer] account. Enter P039 in the Channel M box to learn more about \"Gout: Care Instructions. \"     If you do not have an account, please click on the \"Sign Up Now\" link.   Current as of: August 5, 2020               Content Version: 12.8  © 0072-5526 Healthwise, Incorporated. Care instructions adapted under license by [facility] ([facility]). If you have questions about a medical condition or this instruction, always ask your healthcare professional. [address] any warranty or liability for your use of this information.

## 2021-06-09 NOTE — PROGRESS NOTES
History     Tobacco Use    Smoking status: Former Smoker     Packs/day: 0.25     Years: 7.00     Pack years: 1.75     Types: Cigarettes     Quit date:      Years since quittin.4    Smokeless tobacco: Never Used   Substance Use Topics    Alcohol use: Yes     Comment: occasional      Current Outpatient Medications   Medication Sig Dispense Refill    predniSONE (DELTASONE) 10 MG tablet 4 po qd for 3 days, then 3 po qd for 3 days, then 2 po qd for 3 days, then 1 po qd for 3 days 30 tablet 0    cephALEXin (KEFLEX) 500 MG capsule Take 1 capsule by mouth 4 times daily for 10 days 40 capsule 0    lisinopril (PRINIVIL;ZESTRIL) 20 MG tablet Take 1 tablet by mouth daily 90 tablet 3    ibuprofen (ADVIL;MOTRIN) 200 MG tablet Take 800 mg by mouth every 6 hours as needed for Pain        No current facility-administered medications for this visit. Allergies   Allergen Reactions    Amoxil [Amoxicillin] Itching    Lactose Diarrhea    Tape David Sers Tape] Itching     Health Maintenance   Topic Date Due    COVID-19 Vaccine (1) Never done    HIV screen  Never done    DTaP/Tdap/Td vaccine (1 - Tdap) Never done    Diabetes screen  Never done    Shingles Vaccine (1 of 2) Never done    Flu vaccine (Season Ended) 2021    Potassium monitoring  2021    Creatinine monitoring  2021    Colon cancer screen colonoscopy  10/14/2023    Lipid screen  2025    Hepatitis C screen  Completed    Hepatitis A vaccine  Aged Out    Hepatitis B vaccine  Aged Out    Hib vaccine  Aged Out    Meningococcal (ACWY) vaccine  Aged Out    Pneumococcal 0-64 years Vaccine  Aged Out         Objective:     Physical Exam  /76   Pulse 104   Temp 98.4 °F (36.9 °C) (Oral)   Resp 20   Ht 6' 3\" (1.905 m)   Wt 211 lb (95.7 kg)   BMI 26.37 kg/m²       Impression/Plan:  1. Chronic foot pain, unspecified laterality    2. Situational depression    3.  Bursitis of left elbow, unspecified bursa    gout left elbow  Requested Prescriptions     Signed Prescriptions Disp Refills    predniSONE (DELTASONE) 10 MG tablet 30 tablet 0     Si po qd for 3 days, then 3 po qd for 3 days, then 2 po qd for 3 days, then 1 po qd for 3 days    cephALEXin (KEFLEX) 500 MG capsule 40 capsule 0     Sig: Take 1 capsule by mouth 4 times daily for 10 days     Orders Placed This Encounter   Procedures    XR ELBOW LEFT (2 VIEWS)     Standing Status:   Future     Standing Expiration Date:   2022    CBC Auto Differential     Standing Status:   Future     Standing Expiration Date:   2022    Uric Acid     Standing Status:   Future     Standing Expiration Date:   2022    Sedimentation Rate     Standing Status:   Future     Standing Expiration Date:   2022       Patient giveneducational materials - see patient instructions. Discussed use, benefit, and side effects of prescribed medications. All patient questions answered. Pt voiced understanding. Reviewed health maintenance. Patient agreedwith treatment plan. Follow up as directed. Continue medications as prescribed. Educational information on above diagnosis  provided per AVS.  CBC, uric acid and sed rate ordered. Patient also instructed to get routine labs as well. X-ray of left elbow ordered. Patient declines any psych consult or counseling. Patient also declines any medication therapy at this time. Low purine diet. Warm compresses. Instructed to use Voltaren gel twice daily left elbow. Depression information provided. Low purine diet recommended.   30 min  Electronically signed by LEIA Crespo CNP on 2021 at 9:29 AM

## 2021-06-11 ENCOUNTER — HOSPITAL ENCOUNTER (OUTPATIENT)
Age: 66
Discharge: HOME | End: 2021-06-11
Payer: COMMERCIAL

## 2021-06-11 ENCOUNTER — HOSPITAL ENCOUNTER (OUTPATIENT)
Dept: GENERAL RADIOLOGY | Age: 66
Discharge: HOME | End: 2021-06-11
Payer: COMMERCIAL

## 2021-06-11 DIAGNOSIS — M70.32 BURSITIS OF LEFT ELBOW, UNSPECIFIED BURSA: ICD-10-CM

## 2021-06-11 LAB
BASOPHILS # BLD: 0.4 %
BASOPHILS ABSOLUTE: 0 THOU/MM3 (ref 0–0.1)
CHOLESTEROL, TOTAL: 188 MG/DL (ref 100–199)
EOSINOPHIL # BLD: 0.4 %
EOSINOPHILS ABSOLUTE: 0 THOU/MM3 (ref 0–0.4)
ERYTHROCYTE [DISTWIDTH] IN BLOOD BY AUTOMATED COUNT: 13.2 % (ref 11.5–14.5)
ERYTHROCYTE [DISTWIDTH] IN BLOOD BY AUTOMATED COUNT: 42.5 FL (ref 35–45)
HCT VFR BLD CALC: 42.9 % (ref 42–52)
HDLC SERPL-MCNC: 67 MG/DL
HEMOGLOBIN: 13.6 GM/DL (ref 14–18)
IMMATURE GRANS (ABS): 0.1 THOU/MM3 (ref 0–0.07)
IMMATURE GRANULOCYTES: 0.9 %
LDL CHOLESTEROL CALCULATED: 111 MG/DL
LYMPHOCYTES # BLD: 14.6 %
LYMPHOCYTES ABSOLUTE: 1.7 THOU/MM3 (ref 1–4.8)
MCH RBC QN AUTO: 27.7 PG (ref 26–33)
MCHC RBC AUTO-ENTMCNC: 31.7 GM/DL (ref 32.2–35.5)
MCV RBC AUTO: 87.4 FL (ref 80–94)
MONOCYTES # BLD: 4.7 %
MONOCYTES ABSOLUTE: 0.5 THOU/MM3 (ref 0.4–1.3)
NUCLEATED RED BLOOD CELLS: 0 /100 WBC
PLATELET # BLD: 322 THOU/MM3 (ref 130–400)
PMV BLD AUTO: 10.1 FL (ref 9.4–12.4)
PROSTATE SPECIFIC ANTIGEN: 0.59 NG/ML (ref 0–1)
RBC # BLD: 4.91 MILL/MM3 (ref 4.7–6.1)
SEDIMENTATION RATE, ERYTHROCYTE: 7 MM/HR (ref 0–10)
SEG NEUTROPHILS: 79 %
SEGMENTED NEUTROPHILS ABSOLUTE COUNT: 9.2 THOU/MM3 (ref 1.8–7.7)
TRIGL SERPL-MCNC: 49 MG/DL (ref 0–199)
URIC ACID: 6.2 MG/DL (ref 3.7–7)
WBC # BLD: 11.6 THOU/MM3 (ref 4.8–10.8)

## 2021-06-11 PROCEDURE — 84550 ASSAY OF BLOOD/URIC ACID: CPT

## 2021-06-11 PROCEDURE — 36415 COLL VENOUS BLD VENIPUNCTURE: CPT

## 2021-06-11 PROCEDURE — 84153 ASSAY OF PSA TOTAL: CPT

## 2021-06-11 PROCEDURE — 85651 RBC SED RATE NONAUTOMATED: CPT

## 2021-06-11 PROCEDURE — 73070 X-RAY EXAM OF ELBOW: CPT

## 2021-06-11 PROCEDURE — 80061 LIPID PANEL: CPT

## 2021-06-11 PROCEDURE — 85025 COMPLETE CBC W/AUTO DIFF WBC: CPT

## 2021-06-28 ENCOUNTER — OFFICE VISIT (OUTPATIENT)
Dept: FAMILY MEDICINE CLINIC | Age: 66
End: 2021-06-28
Payer: COMMERCIAL

## 2021-06-28 VITALS
DIASTOLIC BLOOD PRESSURE: 60 MMHG | WEIGHT: 210 LBS | RESPIRATION RATE: 20 BRPM | HEIGHT: 74 IN | SYSTOLIC BLOOD PRESSURE: 126 MMHG | TEMPERATURE: 98.3 F | BODY MASS INDEX: 26.95 KG/M2 | HEART RATE: 88 BPM

## 2021-06-28 DIAGNOSIS — M70.22 OLECRANON BURSITIS, LEFT ELBOW: ICD-10-CM

## 2021-06-28 DIAGNOSIS — F41.8 SITUATIONAL ANXIETY: ICD-10-CM

## 2021-06-28 DIAGNOSIS — M70.32 BURSITIS OF LEFT ELBOW, UNSPECIFIED BURSA: Primary | ICD-10-CM

## 2021-06-28 PROCEDURE — 99214 OFFICE O/P EST MOD 30 MIN: CPT | Performed by: NURSE PRACTITIONER

## 2021-06-28 RX ORDER — PREDNISONE 10 MG/1
TABLET ORAL
Qty: 30 TABLET | Refills: 0 | Status: SHIPPED | OUTPATIENT
Start: 2021-06-28 | End: 2021-07-28 | Stop reason: SDUPTHER

## 2021-06-28 RX ORDER — CEPHALEXIN 500 MG/1
500 CAPSULE ORAL 4 TIMES DAILY
Qty: 40 CAPSULE | Refills: 0 | Status: SHIPPED | OUTPATIENT
Start: 2021-06-28 | End: 2021-07-28 | Stop reason: SDUPTHER

## 2021-06-28 RX ORDER — ALPRAZOLAM 0.25 MG/1
0.25 TABLET ORAL 3 TIMES DAILY PRN
Qty: 90 TABLET | Refills: 0 | Status: SHIPPED | OUTPATIENT
Start: 2021-06-28 | End: 2021-07-28 | Stop reason: SDUPTHER

## 2021-06-28 RX ORDER — CITALOPRAM 10 MG/1
10 TABLET ORAL DAILY
Qty: 30 TABLET | Refills: 3 | Status: SHIPPED | OUTPATIENT
Start: 2021-06-28 | End: 2021-07-28 | Stop reason: SINTOL

## 2021-06-28 NOTE — PATIENT INSTRUCTIONS
Patient Education        Bursitis of the Elbow: Care Instructions  Your Care Instructions  Bursitis is pain and swelling of the bursae. These are sacs of fluid that help your joints move smoothly. Olecranon bursitis is a type of bursitis that affects the back of the elbow. This is sometimes called Franco elbow because the bump that develops looks like the cartoon character Franco's elbow. Injury, overuse, or prolonged pressure on your elbow can cause this form of bursitis. Sometimes it happens when people have arthritis. It also can occur for unknown reasons. Treatment may include draining fluid from the bursa with a needle. If your doctor thought there was infection, he or she may have prescribed antibiotics. You also may get shots of medicine into the bursa to help the swelling go down. Your elbow should get better in a few days or weeks. Follow-up care is a key part of your treatment and safety. Be sure to make and go to all appointments, and call your doctor if you are having problems. It's also a good idea to know your test results and keep a list of the medicines you take. How can you care for yourself at home? · Take pain medicines exactly as directed. ? If the doctor gave you a prescription medicine for pain, take it as prescribed. ? If you are not taking a prescription pain medicine, ask your doctor if you can take an over-the-counter medicine. ? Do not take two or more pain medicines at the same time unless the doctor told you to. Many pain medicines have acetaminophen, which is Tylenol. Too much acetaminophen (Tylenol) can be harmful. · If your doctor prescribed antibiotics, take them as directed. Do not stop taking them just because you feel better. You need to take the full course of antibiotics. · If your doctor gave you a sling, an elastic bandage, or a compression sleeve, wear it exactly as instructed. · Put ice or a cold pack on your elbow for 10 to 20 minutes at a time.  Try to do this every 1 to 2 hours for the next 3 days (when you are awake) or until the swelling goes down. Put a thin cloth between the ice and your skin. · After 3 days, you can try heat, or alternate heat and ice. · Rest your elbow. Try to stop or reduce any activity that causes pain. · Wear elbow pads during physical activity to prevent injury. · Do not lean your elbows on tables or armrests. When should you call for help? Call your doctor now or seek immediate medical care if:    · You have new or worse symptoms of infection, such as:  ? Increased pain, swelling, warmth, or redness. ? Red streaks leading from the area. ? Pus draining from the area. ? A fever. Watch closely for changes in your health, and be sure to contact your doctor if:    · You do not get better as expected. Where can you learn more? Go to https://Blueheath Holdingspepiceweb.[email or web address] org and sign in to your Huddle account. Enter  in the [employer] box to learn more about \"Bursitis of the Elbow: Care Instructions. \"     If you do not have an account, please click on the \"Sign Up Now\" link. Current as of: November 16, 2020               Content Version: 12.9  © 2006-2021 [employer]. Care instructions adapted under license by [facility] ([facility]). If you have questions about a medical condition or this instruction, always ask your healthcare professional. [place] [address] any warranty or liability for your use of this information. Patient Education        Elbow Bursitis: Exercises  Introduction  Here are some examples of exercises for you to try. The exercises may be suggested for a condition or for rehabilitation. Start each exercise slowly. Ease off the exercises if you start to have pain. You will be told when to start these exercises and which ones will work best for you. How to do the exercises  Elbow flexion stretch   1. Lift the arm that bothers you, and bend the elbow.  Your palm should face toward you. 2. With your other hand, gently push on the back of your affected forearm. Press your hand toward your shoulder until you feel a stretch in the back of your upper arm. 3. Hold for at least 15 to 30 seconds. 4. Repeat 2 to 4 times. Elbow extension stretch   1. Extend your affected arm in front of you with your palm facing away from you. 2. Bend back your wrist, pointing your hand up toward the ceiling. 3. With your other hand, gently bend your wrist farther until you feel a mild to moderate stretch in your forearm. 4. Hold for at least 15 to 30 seconds. 5. Repeat 2 to 4 times. 6. Repeat steps 1 through 5. But this time extend your affected arm in front of you with your palm facing up. Then bend back your wrist, pointing your hand toward the floor. Pronation and supination stretch   1. Keep your affected elbow at your side, bent at about 90 degrees. Grasp a pen, pencil, or stick, and wrap your hand around it. If you don't have something to hold on to, make a fist instead. 2. Slowly turn your forearm as far as you can back and forth in each direction. Your hand should face up and then down. 3. Hold each position for about 6 seconds. 4. Relax for up to 10 seconds between repetitions. 5. Repeat 8 to 12 times. Hand flips   1. While seated, place your affected forearm on your thigh. Your palm should face down. 2. Flip your hand over so the back of your hand rests on your thigh and your palm is up. Alternate between palm up and palm down while keeping your forearm on your thigh. 3. Repeat 8 to 12 times. Follow-up care is a key part of your treatment and safety. Be sure to make and go to all appointments, and call your doctor if you are having problems. It's also a good idea to know your test results and keep a list of the medicines you take. Where can you learn more? Go to https://chmaicoleb.Smackages. org and sign in to your [employer] account.  Enter B798 in the [address] [employer] box to learn more about \"Elbow Bursitis: Exercises. \"     If you do not have an account, please click on the \"Sign Up Now\" link. Current as of: November 16, 2020               Content Version: 12.9  © 2006-2021 [email or web address] ([employer]). Care instructions adapted under license by [facility] ([facility]). If you have questions about a medical condition or this instruction, always ask your healthcare professional. [place] [address] any warranty or liability for your use of this information. Patient Education        Learning About Generalized Anxiety Disorder  What is generalized anxiety disorder? We all worry. It's a normal part of life. But when you have generalized anxiety disorder, you worry about lots of things and have a hard time stopping your worry. This worry or anxiety interferes with your relationships, work, and life. What causes it? The cause of generalized anxiety disorder is not known. Some studies show that it might be passed down through families. Several things can cause symptoms of anxiety. They include some health problems, some medicines, too much caffeine, and illegal drugs such as cocaine. What are the symptoms? Generalized anxiety disorder can make you feel worried and stressed about many things almost every day. You may have a hard time controlling your worry. Symptoms include:  · Feeling tired or cranky. You may have a hard time concentrating. · Having headaches or muscle aches. · Feeling shaky, sweating, or having hot flashes. · Feeling lightheaded, sick to your stomach, or out of breath. · Feeling like you can't relax. Or being startled easily. · Having problems falling or staying asleep. How is it diagnosed? Your doctor will ask about your health and how often you worry or feel anxious. People with generalized anxiety disorder have more worry and stress than normal. They feel worried and stressed about many things almost every day.  And these feelings have lasted for at least 6 months. Your doctor also may ask about other symptoms, like whether you:  · Feel restless. · Feel tired. · Have a hard time thinking or feel that your mind goes blank. · Feel cranky. · Have tense muscles. · Have sleep problems. A physical exam and tests can help make sure that your symptoms aren't caused by a different condition, such as a thyroid problem. How is it treated? Counseling and medicine can both work to treat anxiety. The two are often used along with lifestyle changes. Cognitive-behavioral therapy (CBT) is a type of counseling that's used to help treat anxiety. In CBT, you learn how to notice and replace thoughts that make you feel worried. It also can help you learn how to relax when you worry. Applied relaxation therapy may also be used. Your counselor might ask you to imagine a calming situation. This can help you relax. Medicines can help. These medicines are often also used for depression. Selective serotonin reuptake inhibitors (SSRIs) are often tried first. But there are other medicines that your doctor may use. You may need to try a few medicines to find one that works well. Many people feel better by getting regular exercise, eating healthy meals, and getting good sleep. Mindfulnessfocusing on things in the present momentalso can help reduce your anxiety. What can you expect when you have it? Having anxiety can be upsetting. Some people might feel less worried and stressed after a couple of months of treatment. But for other people, it might take longer to feel better. Reaching out to people for help is important. Try not to isolate yourself. Let your family and friends help you. Find someone you can trust and confide in. Talk to that person. When you know what anxiety isand how you can get help for ityou can start to learn new ways of thinking. This can help you cope and work through your anxiety.   Follow-up care is a key part of your treatment and safety. Be sure to make and go to all appointments, and call your doctor if you are having problems. It's also a good idea to know your test results and keep a list of the medicines you take. Where can you learn more? Go to https://chbilly.Kapitall. org and sign in to your Lastline account. Enter G110 in the [employer] box to learn more about \"Learning About Generalized Anxiety Disorder. \"     If you do not have an account, please click on the \"Sign Up Now\" link. Current as of: September 23, 2020               Content Version: 12.9  © 2006-2021 [employer]. Care instructions adapted under license by [facility] ([facility]). If you have questions about a medical condition or this instruction, always ask your healthcare professional. [address] any warranty or liability for your use of this information. Patient Education        Generalized Anxiety Disorder: Care Instructions  Overview     We all worry. It's a normal part of life. But when you have generalized anxiety disorder, you worry about lots of things. You have a hard time not worrying. This worry or anxiety interferes with your relationships, work or school, and other areas of your life. You may worry most days about things like money, health, work, or friends. That may make you feel tired, tense, or cranky. It can make it hard to think. It may get in the way of healthy sleep. Counseling and medicine can both work to treat anxiety. They are often used together with lifestyle changes, such as getting enough sleep. Treatment can include a type of counseling called cognitive-behavioral therapy, or CBT. It helps you notice and replace thoughts that make you worry. You also might have counseling along with those closest to you so that they can help. Follow-up care is a key part of your treatment and safety.  Be sure to make and go to all appointments, and call your doctor if you are having problems. It's also a good idea to know your test results and keep a list of the medicines you take. How can you care for yourself at home? · Get at least 30 minutes of exercise on most days of the week. Walking is a good choice. You also may want to do other activities, such as running, swimming, cycling, or playing tennis or team sports. · Learn and do relaxation exercises, such as deep breathing. · Go to bed at the same time every night. Try for 8 to 10 hours of sleep a night. · Avoid alcohol, marijuana, and illegal drugs. · Find a counselor who uses cognitive-behavioral therapy (CBT). · Don't isolate yourself. Let those closest to you help you. Find someone you can trust and confide in. Talk to that person. · Be safe with medicines. Take your medicines exactly as prescribed. Call your doctor if you think you are having a problem with your medicine. · Practice healthy thinking. How you think can affect how you feel and act. Ask yourself if your thoughts are helpful or unhelpful. If they are unhelpful, you can learn how to change them. · Recognize and accept your anxiety. When you feel anxious, say to yourself, \"This is not an emergency. I feel uncomfortable, but I am not in danger. I can keep going even if I feel anxious. \"  When should you call for help? Call 911  anytime you think you may need emergency care. For example, call if:    · You feel you can't stop from hurting yourself or someone else. Keep the numbers for these national suicide hotlines: 7-790-562-TALK (0-385.614.7472) and 4-930-POOLWHW (8-899.876.2393). If you or someone you know talks about suicide or feeling hopeless, get help right away. Call your doctor or counselor now or seek immediate medical care if:    · You have new anxiety, or your anxiety gets worse.     · You have been feeling sad, depressed, or hopeless or have lost interest in things that you usually enjoy.     · You do not get better as expected.    Where can you learn more?  Go to https://chpepiceweb.[email or web address] [email or web address] org and sign in to your [address] account. Enter G123 in the [facility] box to learn more about \"Generalized Anxiety Disorder: Care Instructions. \"     If you do not have an account, please click on the \"Sign Up Now\" link. Current as of: November 3, 2020               Content Version: 12.9  © 2006-2021 [facility]. Care instructions adapted under license by [facility] ([facility]). If you have questions about a medical condition or this instruction, always ask your healthcare professional. [place] [address] any warranty or liability for your use of this information.

## 2021-07-02 ASSESSMENT — ENCOUNTER SYMPTOMS
WHEEZING: 0
BACK PAIN: 0
SHORTNESS OF BREATH: 0
ABDOMINAL DISTENTION: 0
CHEST TIGHTNESS: 0
COUGH: 0
ABDOMINAL PAIN: 0

## 2021-07-02 NOTE — PROGRESS NOTES
[address] Zack Alvarez [place] [id number]  Dept: [phone]  Dept Fax: [phone]  Loc: [phone]  PROGRESS NOTE      VisitDate: 6/28/2021    Karin Aldana is a 73 y.o. male who presents today for:     Chief Complaint   Patient presents with    Elbow Pain     6/9 Bursitis of left elbow. Had an xray and took keflex and prednisone. States the pain is mainly at the elbow and swelling on the top of LFA. Subjective: Follow-up on left elbow bursitis 6/9/2021. Finished up Keflex and prednisone. States that symptoms have improved dramatically but are still present. History anxiety, gout, hypertension osteoarthritis. Denies fever. Mood stable. Taking medicine as prescribed        Review of Systems   Constitutional: Positive for fever. Negative for activity change, appetite change, chills and fatigue. Eyes: Negative for visual disturbance. Respiratory: Negative for cough, chest tightness, shortness of breath and wheezing. Cardiovascular: Negative for chest pain, palpitations and leg swelling. Gastrointestinal: Negative for abdominal distention and abdominal pain. Genitourinary: Negative for dysuria. Musculoskeletal: Positive for gait problem. Negative for arthralgias, back pain and neck pain. Skin: Negative. Negative for rash. Neurological: Negative for dizziness, light-headedness and headaches. Hematological: Negative for adenopathy. Psychiatric/Behavioral: Positive for decreased concentration and dysphoric mood. The patient is nervous/anxious. All other systems reviewed and are negative.     Past Medical History:   Diagnosis Date    Anxiety     Blood circulation, collateral     Gout     Hypertension     Hypotestosteronism 5/24/2013    Osteoarthritis     PONV (postoperative nausea and vomiting)       Past Surgical History:   Procedure Laterality Date    APPENDECTOMY      GASTROCNEMIUS RECESSION Right 2019    RIGHT 1ST MET-TARSAL FUSION, GASTROC RECESSION, N-C FUSION, T-N FUSION, STJ FUSION, BMA performed by Rosalinda Tejeda DPM at [address] [place] [address] ([facility])  2004    ABDOMINAL    OTHER SURGICAL HISTORY      excision of scrotal wall lesion     Family History   Problem Relation Age of Onset    Diabetes Maternal Grandmother     Heart Disease Maternal Grandmother         PACEMAKER    Cancer Maternal Grandmother     Breast Cancer Maternal Grandmother     Cancer Mother         liver    High Blood Pressure Neg Hx     Stroke Neg Hx      Social History     Tobacco Use    Smoking status: Former Smoker     Packs/day: 0.25     Years: 7.00     Pack years: 1.75     Types: Cigarettes     Quit date:      Years since quittin.5    Smokeless tobacco: Never Used   Substance Use Topics    Alcohol use: Yes     Comment: occasional      Current Outpatient Medications   Medication Sig Dispense Refill    predniSONE (DELTASONE) 10 MG tablet 4 po qd for 3 days, then 3 po qd for 3 days, then 2 po qd for 3 days, then 1 po qd for 3 days 30 tablet 0    cephALEXin (KEFLEX) 500 MG capsule Take 1 capsule by mouth 4 times daily for 10 days 40 capsule 0    citalopram (CELEXA) 10 MG tablet Take 1 tablet by mouth daily 30 tablet 3    ALPRAZolam (XANAX) 0.25 MG tablet Take 1 tablet by mouth 3 times daily as needed for Anxiety for up to 30 days. 90 tablet 0    lisinopril (PRINIVIL;ZESTRIL) 20 MG tablet Take 1 tablet by mouth daily 90 tablet 3    ibuprofen (ADVIL;MOTRIN) 200 MG tablet Take 600-1,000 mg by mouth every 6 hours as needed for Pain        No current facility-administered medications for this visit.      Allergies   Allergen Reactions    Amoxil [Amoxicillin] Itching    Lactose Diarrhea    Tape Hcris Ashlie Tape] Itching     Health Maintenance   Topic Date Due    AAA screen  Never done    COVID-19 Vaccine (1) Never done    HIV screen  Never done    DTaP/Tdap/Td vaccine (1 - Tdap) Never done    Diabetes screen  Never done    Shingles Vaccine (1 of 2) Never done    Pneumococcal 65+ years Vaccine (1 of 1 - PPSV23) Never done    Flu vaccine (1) 09/01/2021    Potassium monitoring  12/30/2021    Creatinine monitoring  12/30/2021    Colon cancer screen colonoscopy  10/14/2023    Lipid screen  06/11/2026    Hepatitis C screen  Completed    Hepatitis A vaccine  Aged Out    Hepatitis B vaccine  Aged Out    Hib vaccine  Aged Out    Meningococcal (ACWY) vaccine  Aged Out     Component      Latest Ref Rng & Units 6/11/2021   WBC      4.8 - 10.8 thou/mm3 11.6 (H)   RBC      4.70 - 6.10 mill/mm3 4.91   Hemoglobin Quant      14.0 - 18.0 gm/dl 13.6 (L)   Hematocrit      42.0 - 52.0 % 42.9   MCV      80.0 - 94.0 fL 87.4   MCH      26.0 - 33.0 pg 27.7   MCHC      32.2 - 35.5 gm/dl 31.7 (L)   RDW-CV      11.5 - 14.5 % 13.2   RDW-SD      35.0 - 45.0 fL 42.5   Platelet Count      887 - 400 thou/mm3 322   MPV      9.4 - 12.4 fL 10.1   Seg Neutrophils      % 79.0   Lymphocytes      % 14.6   Monocytes      % 4.7   Eosinophils      % 0.4   Basophils      % 0.4   Immature Granulocytes      % 0.9   Segs Absolute      1 - 7 thou/mm3 9.2 (H)   Lymphocytes Absolute      1.0 - 4.8 thou/mm3 1.7   Monocytes Absolute      0.4 - 1.3 thou/mm3 0.5   Eosinophils Absolute      0.0 - 0.4 thou/mm3 0.0   Basophils Absolute      0.0 - 0.1 thou/mm3 0.0   Immature Grans (Abs)      0.00 - 0.07 thou/mm3 0.10 (H)   Nucleated Red Blood Cells      /100 wbc 0   Cholesterol, Total      100 - 199 mg/dL 188   Triglycerides      0 - 199 mg/dL 49   HDL Cholesterol      mg/dL 67   LDL Calculated      mg/dL 111   Uric Acid      3.7 - 7.0 mg/dL 6.2   Sed Rate      0 - 10 mm/hr 7   Prostatic Specific Ag      0.00 - 1.00 ng/mL 0.59   Reviewed with patient     PROCEDURE: XR ELBOW LEFT (2 VIEWS)       CLINICAL INFORMATION: Bursitis of left elbow, unspecified bursa       COMPARISON: No prior study.       TECHNIQUE: AP, lateral, oblique, and radial head views of the elbow performed.           FINDINGS:    POSTOPERATIVE CHANGES: None.       ALIGNMENT: Anatomic.       MINERALIZATION: Normal.       FRACTURE/OSSEOUS DESTRUCTION/PERIOSTITIS: None.       JOINTS: Unremarkable.       JOINT EFFUSION: None.       SOFT TISSUES: There is calcification seen medial to the medial epicondyle consistent with old medial epicondylitis. There is a calcification seen within the distal triceps tendon consistent with old trauma. There is fullness seen within the olecranon    consistent with olecranon bursitis. There is a calcification seen by the conoid process suspicious for degenerative changes.       OTHER: None.               Impression   No evidence of acute fracture.       There is some calcification seen within the triceps tendon consistent with old trauma.       There is calcification seen within the common flexure tendon consistent with a prior medial epicondylitis.       Soft tissue swelling olecranon bursa consistent with olecranon bursitis.             **This report has been created using voice recognition software.  It may contain minor errors which are inherent in voice recognition technology. **       Final report electronically signed by Dr Alen Salazar on 6/11/2021 4:29 PM       Order History    Open Order Details   PACS Images     Show images for XR ELBOW LEFT (2 VIEWS)   Results History Report    View Report   Associated Diagnoses    Bursitis of left elbow, unspecified bursa       External Result Report    External Result Report   Existing Charges    Charge Line Charge Code Status Charge Trigger Charge Type   [id number]  Elbow 2 Views [[id number]] [address] Billing Imaging end exam Technical   [id number] X-RAY ELBOW 2 VW [address] [address] (STR)  Imaging result study Professional   Order Report     Order Details  Implants     Bone/Graft/Tissue/Human/Synth   Wedge Bone Allopure Meneses 10mm - Implanted   (Right)  Inventory item: GRAFT BNE [id number] [facility] [employer] [facility] [employer] FOR Model/Cat number: [id number]   : [address] [employer]-PMM Lot number: [id number]   As of 12/27/2019    Status: Implanted        Wedge Allopure Meneses 8mm - Implanted   (Right)  Inventory item: GRAFT BNE [id number] [facility] [employer] [facility] [employer] FOR OSTEOTMY Model/Cat number: [id number]   : [address] [employer] Lot number: [id number]   As of 12/27/2019    Status: Implanted        Aniket-Graft Bone Augment 3ml Inj - Implanted   (Right)  Inventory item: MAVIS-GRAFT BONE AUGMENT 3ML INJ Model/Cat number: [id number]   : [address] [employer]PMM Lot number: [id number]   As of 12/27/2019    Status: Implanted        Bone Matrix 10cc Vivigen - Implanted   (Right)  Inventory item: GRAFT BNE SUB 10CC DEMIN CELLULAR MTRX VIVIGEN Model/Cat number: [id number]   : [employer]PMM     As of 12/27/2019    Status: Implanted        Bone Matrix 5cc Vivigen - Implanted   (Right)  Inventory item: GRAFT BNE SUB 5ML HCT P IN CORTICOCANCELLOUS CELLULAR BNE Model/Cat number: [id number]   : SYNTHES-PMM     As of 12/27/2019    Status: Implanted        Leg/Ankle/Foot/Toe   Speedtitan: [id number] - Implanted   (Right)  Model/Cat number: [id number] : SY[employer]TH[employer]-PMM   Lot number: [id number]     As of 12/27/2019    Status: Implanted        Impl Bone Cont Comprss [id number] - Implanted   (Right)  Inventory item: STAPLE BNE FIX BRDG L20MM LEG L15MM NIT BREAKTHROUGH TAPR Model/Cat number: [id number]   : [employer]PMM Lot number: [id number]   As of 12/27/2019    Status: Implanted        Impl Bone Cont Comprss [id number] - Implanted   (Right)  Inventory item: STAPLE BNE FIX BRDG L20MM LEG L15MM NIT BREAKTHROUGH TAPR Model/Cat number: [id number]   : [employer]PMM Lot number: [id number]   As of 12/27/2019    Status: Implanted        Impl Speed [id number] - Implanted   (Right)  Inventory item: KIT STPL BRDG 15MM LEG 15MM WIRE 1. 5X1. 5MM HND WRST CONT Model/Cat number: [id number]   : [employer]PMM Lot number: [id number]   As of 12/27/2019    Status: Implanted        Screw/Plate/Nail/Nehemiah   95 Mm Synthese - Implanted   (Right)  Model/Cat number: [id number] : SYNTHES-PMM   As of 12/27/2019    Status: Implanted        90 Mm Synthese - Implanted   (Right)  Model/Cat number: [id number] : SYNTHES-PMM   As of 12/27/2019    Status: Implanted        4.5mm Headless Screw Short Thred 44mm - Implanted   (Right)  Model/Cat number: [id number] : SYNTHES-PMM   As of 12/27/2019    Status: Implanted            Objective:     Physical Exam  Vitals and nursing note reviewed. Constitutional:       Appearance: Normal appearance. He is well-developed. He is not diaphoretic. HENT:      Head: Normocephalic and atraumatic. Not macrocephalic and not microcephalic. Right Ear: Hearing, tympanic membrane, ear canal and external ear normal. No drainage or tenderness. No middle ear effusion. No hemotympanum. Tympanic membrane is not injected, scarred, perforated or bulging. Left Ear: Hearing, tympanic membrane, ear canal and external ear normal. No drainage or tenderness. No middle ear effusion. No hemotympanum. Tympanic membrane is not injected, scarred, perforated or bulging. Nose: No nasal deformity, septal deviation, mucosal edema or rhinorrhea. Right Sinus: No maxillary sinus tenderness or frontal sinus tenderness. Left Sinus: No maxillary sinus tenderness or frontal sinus tenderness. Mouth/Throat:      Mouth: No oral lesions. Dentition: Normal dentition. Does not have dentures. No dental caries or dental abscesses. Pharynx: No oropharyngeal exudate or posterior oropharyngeal erythema. Tonsils: No tonsillar abscesses. Eyes:      General: Lids are normal. No scleral icterus. Extraocular Movements:      Right eye: Normal extraocular motion.       Left eye: Normal extraocular motion. Conjunctiva/sclera: Conjunctivae normal.      Right eye: Right conjunctiva is not injected. Left eye: Left conjunctiva is not injected. Neck:      Thyroid: No thyroid mass or thyromegaly. Vascular: No carotid bruit or JVD. Trachea: Trachea normal.   Cardiovascular:      Rate and Rhythm: Normal rate and regular rhythm. Heart sounds: Normal heart sounds, S1 normal and S2 normal. No murmur heard. No friction rub. No gallop. Pulmonary:      Effort: Pulmonary effort is normal. No respiratory distress. Breath sounds: Normal breath sounds. No wheezing, rhonchi or rales. Chest:      Chest wall: No tenderness. Abdominal:      General: Bowel sounds are normal.      Palpations: Abdomen is soft. There is no hepatomegaly, splenomegaly or mass. Tenderness: There is no guarding or rebound. Hernia: No hernia is present. There is no hernia in the ventral area or left inguinal area. Musculoskeletal:      Left elbow: Swelling and effusion present. Decreased range of motion. Tenderness present. Cervical back: Normal range of motion and neck supple. No edema or erythema. Normal range of motion. Comments: Parents left elbow improved significantly. Mild erythema mild edema noted limited range of motion active and passive. Lymphadenopathy:      Head:      Right side of head: No submental, submandibular, tonsillar, preauricular, posterior auricular or occipital adenopathy. Left side of head: No submental, submandibular, tonsillar, preauricular, posterior auricular or occipital adenopathy. Cervical: No cervical adenopathy. Right cervical: No superficial, deep or posterior cervical adenopathy. Left cervical: No superficial, deep or posterior cervical adenopathy. Upper Body:      Right upper body: No supraclavicular or pectoral adenopathy. Left upper body: No supraclavicular or pectoral adenopathy. Skin:     General: Skin is warm and dry. Coloration: Skin is not pale. Findings: No bruising, ecchymosis, laceration, lesion or rash. Nails: There is no clubbing. Neurological:      General: No focal deficit present. Mental Status: He is alert and oriented to person, place, and time. Cranial Nerves: No cranial nerve deficit. Motor: No abnormal muscle tone. Coordination: Coordination normal.      Deep Tendon Reflexes: Reflexes normal.   Psychiatric:         Speech: Speech normal.         Behavior: Behavior normal.         Thought Content: Thought content normal.         Judgment: Judgment normal.       /60   Pulse 88   Temp 98.3 °F (36.8 °C) (Oral)   Resp 20   Ht 6' 2\" (1.88 m)   Wt 210 lb (95.3 kg)   BMI 26.96 kg/m²       Impression/Plan:  1. Bursitis of left elbow, unspecified bursa    2. Olecranon bursitis, left elbow    3. Situational anxiety      Requested Prescriptions     Signed Prescriptions Disp Refills    predniSONE (DELTASONE) 10 MG tablet 30 tablet 0     Si po qd for 3 days, then 3 po qd for 3 days, then 2 po qd for 3 days, then 1 po qd for 3 days    cephALEXin (KEFLEX) 500 MG capsule 40 capsule 0     Sig: Take 1 capsule by mouth 4 times daily for 10 days    citalopram (CELEXA) 10 MG tablet 30 tablet 3     Sig: Take 1 tablet by mouth daily    ALPRAZolam (XANAX) 0.25 MG tablet 90 tablet 0     Sig: Take 1 tablet by mouth 3 times daily as needed for Anxiety for up to 30 days. No orders of the defined types were placed in this encounter. Patient giveneducational materials - see patient instructions. Discussed use, benefit, and side effects of prescribed medications. All patient questions answered. Pt voiced understanding. Reviewed health maintenance. Patient agreedwith treatment plan. Follow up as directed. Xanax refilled. OARRS report reviewed. Celexa refilled. Repeat prednisone taper and Keflex.   Patient declines Ortho consult at this time follow-up symptoms persist. Electronically signed by LEIA Curiel CNP on 7/2/2021 at 11:03 AM

## 2021-07-28 ENCOUNTER — OFFICE VISIT (OUTPATIENT)
Dept: FAMILY MEDICINE CLINIC | Age: 66
End: 2021-07-28
Payer: COMMERCIAL

## 2021-07-28 VITALS
TEMPERATURE: 99 F | DIASTOLIC BLOOD PRESSURE: 58 MMHG | WEIGHT: 213 LBS | BODY MASS INDEX: 27.35 KG/M2 | HEART RATE: 72 BPM | SYSTOLIC BLOOD PRESSURE: 118 MMHG | RESPIRATION RATE: 16 BRPM

## 2021-07-28 DIAGNOSIS — M25.522 CHRONIC ELBOW PAIN, LEFT: ICD-10-CM

## 2021-07-28 DIAGNOSIS — F41.8 SITUATIONAL ANXIETY: ICD-10-CM

## 2021-07-28 DIAGNOSIS — M70.22 OLECRANON BURSITIS, LEFT ELBOW: Primary | ICD-10-CM

## 2021-07-28 DIAGNOSIS — G89.29 CHRONIC ELBOW PAIN, LEFT: ICD-10-CM

## 2021-07-28 PROCEDURE — 99214 OFFICE O/P EST MOD 30 MIN: CPT | Performed by: NURSE PRACTITIONER

## 2021-07-28 RX ORDER — ALPRAZOLAM 0.25 MG/1
.25-.5 TABLET ORAL 3 TIMES DAILY PRN
Qty: 120 TABLET | Refills: 0 | Status: SHIPPED | OUTPATIENT
Start: 2021-07-28 | End: 2021-09-17 | Stop reason: SDUPTHER

## 2021-07-28 RX ORDER — CEPHALEXIN 500 MG/1
500 CAPSULE ORAL 4 TIMES DAILY
Qty: 40 CAPSULE | Refills: 0 | Status: SHIPPED | OUTPATIENT
Start: 2021-07-28 | End: 2021-08-07

## 2021-07-28 RX ORDER — PREDNISONE 10 MG/1
TABLET ORAL
Qty: 30 TABLET | Refills: 0 | Status: SHIPPED | OUTPATIENT
Start: 2021-07-28 | End: 2021-08-07

## 2021-07-28 RX ORDER — MELOXICAM 7.5 MG/1
TABLET ORAL
COMMUNITY
Start: 2021-07-22 | End: 2022-04-12 | Stop reason: SDUPTHER

## 2021-08-03 ASSESSMENT — ENCOUNTER SYMPTOMS
ABDOMINAL PAIN: 0
COUGH: 0
CHEST TIGHTNESS: 0
SHORTNESS OF BREATH: 0
BACK PAIN: 0
ABDOMINAL DISTENTION: 0
WHEEZING: 0

## 2021-08-03 NOTE — PROGRESS NOTES
[address] [address]  Dept: [phone]  Dept Fax: [phone]  Loc: [phone]  PROGRESS NOTE      VisitDate: 7/28/2021    Seb Tijerina is a 73 y.o. male who presents today for:     Chief Complaint   Patient presents with    1 Month Follow-Up     still having trouble with left elbow         Subjective:  Patient presents for 1 month follow-up regarding left elbow olecranon bursitis. Reports that redness pain has improved significantly. Complains of continued limited range of motion. Denies fever, chest pain, shortness of breath. Patient complains of increased anxiety. requesting refill Xanax. History anxiety, gout, hypertension, osteoarthritis, right gastrocnemius recession. Review of Systems   Constitutional: Negative for activity change, appetite change, chills, fatigue and fever. Eyes: Negative for visual disturbance. Respiratory: Negative for cough, chest tightness, shortness of breath and wheezing. Cardiovascular: Negative for chest pain, palpitations and leg swelling. Gastrointestinal: Negative for abdominal distention and abdominal pain. Genitourinary: Negative for dysuria. Musculoskeletal: Positive for arthralgias and gait problem. Negative for back pain and neck pain. Skin: Negative. Negative for rash. Neurological: Negative for dizziness, light-headedness and headaches. Hematological: Negative for adenopathy. Psychiatric/Behavioral: Negative for decreased concentration and dysphoric mood. The patient is nervous/anxious. All other systems reviewed and are negative.     Past Medical History:   Diagnosis Date    Anxiety     Blood circulation, collateral     Gout     Hypertension     Hypotestosteronism 5/24/2013    Osteoarthritis     PONV (postoperative nausea and vomiting)       Past Surgical History:   Procedure Laterality Date    APPENDECTOMY      GASTROCNEMIUS RECESSION Right 2019    RIGHT 1ST MET-TARSAL FUSION, GASTROC RECESSION, N-C FUSION, T-N FUSION, STJ FUSION, BMA performed by Yanely Walters DPM at [facility]  2004    ABDOMINAL    OTHER SURGICAL HISTORY      excision of scrotal wall lesion     Family History   Problem Relation Age of Onset    Diabetes Maternal Grandmother     Heart Disease Maternal Grandmother         PACEMAKER    Cancer Maternal Grandmother     Breast Cancer Maternal Grandmother     Cancer Mother         liver    High Blood Pressure Neg Hx     Stroke Neg Hx      Social History     Tobacco Use    Smoking status: Former Smoker     Packs/day: 0.25     Years: 7.00     Pack years: 1.75     Types: Cigarettes     Quit date:      Years since quittin.6    Smokeless tobacco: Never Used   Substance Use Topics    Alcohol use: Yes     Comment: occasional      Current Outpatient Medications   Medication Sig Dispense Refill    meloxicam (MOBIC) 7.5 MG tablet take 1 tablet by mouth once daily      ALPRAZolam (XANAX) 0.25 MG tablet Take 1-2 tablets by mouth 3 times daily as needed for Anxiety for up to 30 days. 120 tablet 0    predniSONE (DELTASONE) 10 MG tablet 4 po qd for 3 days, then 3 po qd for 3 days, then 2 po qd for 3 days, then 1 po qd for 3 days 30 tablet 0    cephALEXin (KEFLEX) 500 MG capsule Take 1 capsule by mouth 4 times daily for 10 days 40 capsule 0    lisinopril (PRINIVIL;ZESTRIL) 20 MG tablet Take 1 tablet by mouth daily 90 tablet 3    ibuprofen (ADVIL;MOTRIN) 200 MG tablet Take 600-1,000 mg by mouth every 6 hours as needed for Pain        No current facility-administered medications for this visit.      Allergies   Allergen Reactions    Amoxil [Amoxicillin] Itching    Lactose Diarrhea    Tape Kristina Rice Tape] Itching     Health Maintenance   Topic Date Due    AAA screen  Never done    COVID-19 Vaccine (1) Never done    HIV screen  Never done    DTaP/Tdap/Td vaccine (1 - Tdap) Never done   [facility] Diabetes screen  Never done    Shingles Vaccine (1 of 2) Never done    Pneumococcal 65+ years Vaccine (1 of 1 - PPSV23) Never done    Flu vaccine (1) 09/01/2021    Potassium monitoring  12/30/2021    Creatinine monitoring  12/30/2021    Colon cancer screen colonoscopy  10/14/2023    Lipid screen  06/11/2026    Hepatitis C screen  Completed    Hepatitis A vaccine  Aged Out    Hepatitis B vaccine  Aged Out    Hib vaccine  Aged Out    Meningococcal (ACWY) vaccine  Aged Out         Objective:     Physical Exam  Vitals and nursing note reviewed. Constitutional:       Appearance: Normal appearance. He is normal weight. HENT:      Nose: Nose normal.      Mouth/Throat:      Pharynx: Oropharynx is clear. Eyes:      Conjunctiva/sclera: Conjunctivae normal.   Cardiovascular:      Rate and Rhythm: Normal rate and regular rhythm. Pulses: Normal pulses. Heart sounds: Normal heart sounds. Pulmonary:      Effort: Pulmonary effort is normal.      Breath sounds: Normal breath sounds. Musculoskeletal:      Left elbow: Swelling present. Decreased range of motion. Tenderness present in olecranon process. Cervical back: Normal range of motion. Comments: Edema, tenderness, erythema much improved since previous visit. Continued decreased active and passive range of motion 50%. Nodular formation noted at the olecranon process. Neurological:      General: No focal deficit present. Mental Status: He is alert and oriented to person, place, and time. Psychiatric:         Attention and Perception: Attention normal.         Mood and Affect: Mood normal.         Speech: Speech normal.         Behavior: Behavior normal. Behavior is cooperative. Thought Content:  Thought content normal.         Cognition and Memory: Cognition normal.         Judgment: Judgment normal.       BP (!) 118/58   Pulse 72   Temp 99 °F (37.2 °C) (Oral)   Resp 16   Wt 213 lb (96.6 kg)   BMI 27.35 kg/m² Impression/Plan:  1. Olecranon bursitis, left elbow    2. Situational anxiety    3. Chronic elbow pain, left      Requested Prescriptions     Signed Prescriptions Disp Refills    ALPRAZolam (XANAX) 0.25 MG tablet 120 tablet 0     Sig: Take 1-2 tablets by mouth 3 times daily as needed for Anxiety for up to 30 days.  predniSONE (DELTASONE) 10 MG tablet 30 tablet 0     Si po qd for 3 days, then 3 po qd for 3 days, then 2 po qd for 3 days, then 1 po qd for 3 days    cephALEXin (KEFLEX) 500 MG capsule 40 capsule 0     Sig: Take 1 capsule by mouth 4 times daily for 10 days     Orders Placed This Encounter   Procedures   Katharina Wu MD, Orthopedic Surgery, [facility]     Referral Priority:   Routine     Referral Type:   Eval and Treat     Referral Reason:   Specialty Services Required     Referred to Provider:   Latisha Colin MD     Requested Specialty:   Orthopedic Surgery     Number of Visits Requested:   1       Patient giveneducational materials - see patient instructions. Discussed use, benefit, and side effects of prescribed medications. All patient questions answered. Pt voiced understanding. Reviewed health maintenance. Patient agreedwith treatment plan. Follow up as directed. Xanax refilled. OARRS report reviewed. Keflex and prednisone prescribed. Consult Ortho. Continue medications as prescribed.  Educational information on above diagnosis  provided per AVS.  30 min  Electronically signed by LEIA Cesar CNP on 8/3/2021 at 12:57 PM

## 2021-09-16 DIAGNOSIS — F41.8 SITUATIONAL ANXIETY: ICD-10-CM

## 2021-09-17 RX ORDER — ALPRAZOLAM 0.25 MG/1
.25-.5 TABLET ORAL 3 TIMES DAILY PRN
Qty: 120 TABLET | Refills: 0 | Status: SHIPPED | OUTPATIENT
Start: 2021-09-17 | End: 2021-12-06 | Stop reason: SDUPTHER

## 2021-12-06 ENCOUNTER — OFFICE VISIT (OUTPATIENT)
Dept: FAMILY MEDICINE CLINIC | Age: 66
End: 2021-12-06
Payer: MEDICARE

## 2021-12-06 VITALS
OXYGEN SATURATION: 96 % | RESPIRATION RATE: 16 BRPM | DIASTOLIC BLOOD PRESSURE: 72 MMHG | WEIGHT: 216 LBS | HEIGHT: 74 IN | TEMPERATURE: 98.1 F | SYSTOLIC BLOOD PRESSURE: 132 MMHG | HEART RATE: 66 BPM | BODY MASS INDEX: 27.72 KG/M2

## 2021-12-06 DIAGNOSIS — F41.8 SITUATIONAL ANXIETY: ICD-10-CM

## 2021-12-06 PROCEDURE — G8417 CALC BMI ABV UP PARAM F/U: HCPCS | Performed by: NURSE PRACTITIONER

## 2021-12-06 PROCEDURE — 3017F COLORECTAL CA SCREEN DOC REV: CPT | Performed by: NURSE PRACTITIONER

## 2021-12-06 PROCEDURE — 4040F PNEUMOC VAC/ADMIN/RCVD: CPT | Performed by: NURSE PRACTITIONER

## 2021-12-06 PROCEDURE — 99213 OFFICE O/P EST LOW 20 MIN: CPT | Performed by: NURSE PRACTITIONER

## 2021-12-06 PROCEDURE — G8484 FLU IMMUNIZE NO ADMIN: HCPCS | Performed by: NURSE PRACTITIONER

## 2021-12-06 PROCEDURE — G8427 DOCREV CUR MEDS BY ELIG CLIN: HCPCS | Performed by: NURSE PRACTITIONER

## 2021-12-06 PROCEDURE — 1036F TOBACCO NON-USER: CPT | Performed by: NURSE PRACTITIONER

## 2021-12-06 PROCEDURE — 1123F ACP DISCUSS/DSCN MKR DOCD: CPT | Performed by: NURSE PRACTITIONER

## 2021-12-06 RX ORDER — ALPRAZOLAM 0.25 MG/1
.25-.5 TABLET ORAL 3 TIMES DAILY PRN
Qty: 120 TABLET | Refills: 0 | Status: SHIPPED | OUTPATIENT
Start: 2021-12-06 | End: 2022-04-12 | Stop reason: SDUPTHER

## 2021-12-06 ASSESSMENT — ENCOUNTER SYMPTOMS
WHEEZING: 0
SHORTNESS OF BREATH: 0
ABDOMINAL PAIN: 0
COUGH: 0
CHEST TIGHTNESS: 0
BACK PAIN: 0
ABDOMINAL DISTENTION: 0

## 2021-12-06 NOTE — PROGRESS NOTES
[address] [place] [facility] [address]  Dept: [phone]  Dept Fax: [phone]  Loc: [phone]  PROGRESS NOTE      VisitDate: 12/6/2021    Kyaw Mejias is a 73 y.o. male who presents today for:     Chief Complaint   Patient presents with    3 Month Follow-Up     Medication refill         Subjective:  Routine 3-month Xanax refill. History of situational anxiety. Reports mood stable. History of gout hypertension osteoarthritis chronic right lower leg pain/deformity. Review of Systems   Constitutional: Negative for activity change, appetite change, chills, fatigue and fever. Eyes: Negative for visual disturbance. Respiratory: Negative for cough, chest tightness, shortness of breath and wheezing. Cardiovascular: Negative for chest pain, palpitations and leg swelling. Gastrointestinal: Negative for abdominal distention and abdominal pain. Genitourinary: Negative for dysuria. Musculoskeletal: Positive for arthralgias and gait problem. Negative for back pain and neck pain. Skin: Negative. Negative for rash. Neurological: Negative for dizziness, light-headedness and headaches. Hematological: Negative for adenopathy. Psychiatric/Behavioral: Positive for decreased concentration and dysphoric mood. The patient is nervous/anxious. All other systems reviewed and are negative.     Past Medical History:   Diagnosis Date    Anxiety     Blood circulation, collateral     Gout     Hypertension     Hypotestosteronism 5/24/2013    Osteoarthritis     PONV (postoperative nausea and vomiting)       Past Surgical History:   Procedure Laterality Date    APPENDECTOMY      GASTROCNEMIUS RECESSION Right 12/27/2019    RIGHT 1ST MET-TARSAL FUSION, GASTROC RECESSION, N-C FUSION, T-N FUSION, STJ FUSION, BMA performed by Deborah Sauceda DPM at [facility] ([facility])  2004    [address] [facility] OTHER SURGICAL HISTORY  2015 excision of scrotal wall lesion     Family History   Problem Relation Age of Onset    Diabetes Maternal Grandmother     Heart Disease Maternal Grandmother         PACEMAKER    Cancer Maternal Grandmother     Breast Cancer Maternal Grandmother     Cancer Mother         liver    High Blood Pressure Neg Hx     Stroke Neg Hx      Social History     Tobacco Use    Smoking status: Former Smoker     Packs/day: 0.25     Years: 7.00     Pack years: 1.75     Types: Cigarettes     Quit date:      Years since quittin.9    Smokeless tobacco: Never Used   Substance Use Topics    Alcohol use: Yes     Comment: occasional      Current Outpatient Medications   Medication Sig Dispense Refill    ALPRAZolam (XANAX) 0.25 MG tablet Take 1-2 tablets by mouth 3 times daily as needed for Anxiety for up to 30 days. 120 tablet 0    meloxicam (MOBIC) 7.5 MG tablet take 1 tablet by mouth once daily      lisinopril (PRINIVIL;ZESTRIL) 20 MG tablet Take 1 tablet by mouth daily 90 tablet 3    ibuprofen (ADVIL;MOTRIN) 200 MG tablet Take 600-1,000 mg by mouth every 6 hours as needed for Pain        No current facility-administered medications for this visit.      Allergies   Allergen Reactions    Amoxil [Amoxicillin] Itching    Lactose Diarrhea    Tape Isai Boss Tape] Itching     Health Maintenance   Topic Date Due    AAA screen  Never done    COVID-19 Vaccine (1) Never done    HIV screen  Never done    DTaP/Tdap/Td vaccine (1 - Tdap) Never done    Diabetes screen  Never done    Shingles Vaccine (1 of 2) Never done    Pneumococcal 65+ years Vaccine (1 of 1 - PPSV23) Never done    Flu vaccine (1) Never done    Potassium monitoring  2021    Creatinine monitoring  2021    Colon cancer screen colonoscopy  10/14/2023    Lipid screen  2026    Hepatitis C screen  Completed    Hepatitis A vaccine  Aged Out    Hepatitis B vaccine  Aged Out    Hib vaccine  Aged Out    Meningococcal (ACWY) vaccine  Aged Out         Objective:     Physical Exam  Vitals and nursing note reviewed. Constitutional:       Appearance: Normal appearance. He is well-developed. He is not diaphoretic. HENT:      Head: Normocephalic and atraumatic. Not macrocephalic and not microcephalic. Right Ear: Hearing, tympanic membrane, ear canal and external ear normal. No drainage or tenderness. No middle ear effusion. No hemotympanum. Tympanic membrane is not injected, scarred, perforated or bulging. Left Ear: Hearing, tympanic membrane, ear canal and external ear normal. No drainage or tenderness. No middle ear effusion. No hemotympanum. Tympanic membrane is not injected, scarred, perforated or bulging. Nose: No nasal deformity, septal deviation, mucosal edema or rhinorrhea. Right Sinus: No maxillary sinus tenderness or frontal sinus tenderness. Left Sinus: No maxillary sinus tenderness or frontal sinus tenderness. Mouth/Throat:      Mouth: No oral lesions. Dentition: Normal dentition. Does not have dentures. No dental caries or dental abscesses. Pharynx: No oropharyngeal exudate or posterior oropharyngeal erythema. Tonsils: No tonsillar abscesses. Eyes:      General: Lids are normal. No scleral icterus. Extraocular Movements:      Right eye: Normal extraocular motion. Left eye: Normal extraocular motion. Conjunctiva/sclera: Conjunctivae normal.      Right eye: Right conjunctiva is not injected. Left eye: Left conjunctiva is not injected. Neck:      Thyroid: No thyroid mass or thyromegaly. Vascular: No carotid bruit or JVD. Trachea: Trachea normal.   Cardiovascular:      Rate and Rhythm: Normal rate and regular rhythm. Heart sounds: Normal heart sounds, S1 normal and S2 normal. No murmur heard. No friction rub. No gallop. Pulmonary:      Effort: Pulmonary effort is normal. No respiratory distress. Breath sounds: Normal breath sounds.  No wheezing, rhonchi or rales. Chest:      Chest wall: No tenderness. Breasts:      Right: No supraclavicular adenopathy. Left: No supraclavicular adenopathy. Abdominal:      General: Bowel sounds are normal.      Palpations: Abdomen is soft. There is no hepatomegaly, splenomegaly or mass. Tenderness: There is no guarding or rebound. Hernia: No hernia is present. There is no hernia in the ventral area or left inguinal area. Musculoskeletal:         General: No tenderness. Normal range of motion. Cervical back: Normal range of motion and neck supple. No edema or erythema. Normal range of motion. Lymphadenopathy:      Head:      Right side of head: No submental, submandibular, tonsillar, preauricular, posterior auricular or occipital adenopathy. Left side of head: No submental, submandibular, tonsillar, preauricular, posterior auricular or occipital adenopathy. Cervical: No cervical adenopathy. Right cervical: No superficial, deep or posterior cervical adenopathy. Left cervical: No superficial, deep or posterior cervical adenopathy. Upper Body:      Right upper body: No supraclavicular or pectoral adenopathy. Left upper body: No supraclavicular or pectoral adenopathy. Skin:     General: Skin is warm and dry. Coloration: Skin is not pale. Findings: No bruising, ecchymosis, laceration, lesion or rash. Nails: There is no clubbing. Neurological:      Mental Status: He is alert and oriented to person, place, and time. Cranial Nerves: No cranial nerve deficit. Motor: No abnormal muscle tone. Coordination: Coordination normal.      Deep Tendon Reflexes: Reflexes normal.   Psychiatric:         Attention and Perception: Attention normal.         Mood and Affect: Mood normal.         Speech: Speech normal.         Behavior: Behavior normal.         Thought Content:  Thought content normal.         Judgment: Judgment normal.       /72   Pulse 66 Temp 98.1 °F (36.7 °C) (Oral)   Resp 16   Ht 6' 2\" (1.88 m)   Wt 216 lb (98 kg)   SpO2 96%   BMI 27.73 kg/m²       Impression/Plan:  1. Situational anxiety      Requested Prescriptions     Signed Prescriptions Disp Refills    ALPRAZolam (XANAX) 0.25 MG tablet 120 tablet 0     Sig: Take 1-2 tablets by mouth 3 times daily as needed for Anxiety for up to 30 days. No orders of the defined types were placed in this encounter. Patient giveneducational materials - see patient instructions. Discussed use, benefit, and side effects of prescribed medications. All patient questions answered. Pt voiced understanding. Reviewed health maintenance. Patient agreedwith treatment plan. Follow up as directed. Xanax refilled. OARRS report reviewed. Continue current meds.        Electronically signed by LEIA Mckeon CNP on 12/6/2021 at 1:46 PM

## 2022-01-19 DIAGNOSIS — I10 ESSENTIAL HYPERTENSION: ICD-10-CM

## 2022-01-19 RX ORDER — LISINOPRIL 20 MG/1
TABLET ORAL
Qty: 90 TABLET | Refills: 3 | Status: SHIPPED | OUTPATIENT
Start: 2022-01-19

## 2022-04-12 ENCOUNTER — OFFICE VISIT (OUTPATIENT)
Dept: FAMILY MEDICINE CLINIC | Age: 67
End: 2022-04-12
Payer: MEDICARE

## 2022-04-12 VITALS
SYSTOLIC BLOOD PRESSURE: 134 MMHG | WEIGHT: 218 LBS | RESPIRATION RATE: 18 BRPM | HEART RATE: 69 BPM | TEMPERATURE: 98 F | OXYGEN SATURATION: 97 % | DIASTOLIC BLOOD PRESSURE: 64 MMHG | BODY MASS INDEX: 27.99 KG/M2

## 2022-04-12 DIAGNOSIS — M70.22 OLECRANON BURSITIS, LEFT ELBOW: ICD-10-CM

## 2022-04-12 DIAGNOSIS — L97.511 SKIN ULCER OF RIGHT FOOT, LIMITED TO BREAKDOWN OF SKIN (HCC): ICD-10-CM

## 2022-04-12 DIAGNOSIS — F41.8 SITUATIONAL ANXIETY: ICD-10-CM

## 2022-04-12 DIAGNOSIS — I10 ESSENTIAL HYPERTENSION: ICD-10-CM

## 2022-04-12 DIAGNOSIS — M70.32 BURSITIS OF LEFT ELBOW, UNSPECIFIED BURSA: Primary | ICD-10-CM

## 2022-04-12 PROCEDURE — 4040F PNEUMOC VAC/ADMIN/RCVD: CPT | Performed by: NURSE PRACTITIONER

## 2022-04-12 PROCEDURE — 96372 THER/PROPH/DIAG INJ SC/IM: CPT | Performed by: NURSE PRACTITIONER

## 2022-04-12 PROCEDURE — 99214 OFFICE O/P EST MOD 30 MIN: CPT | Performed by: NURSE PRACTITIONER

## 2022-04-12 PROCEDURE — 1123F ACP DISCUSS/DSCN MKR DOCD: CPT | Performed by: NURSE PRACTITIONER

## 2022-04-12 PROCEDURE — 3017F COLORECTAL CA SCREEN DOC REV: CPT | Performed by: NURSE PRACTITIONER

## 2022-04-12 PROCEDURE — G8417 CALC BMI ABV UP PARAM F/U: HCPCS | Performed by: NURSE PRACTITIONER

## 2022-04-12 PROCEDURE — 1036F TOBACCO NON-USER: CPT | Performed by: NURSE PRACTITIONER

## 2022-04-12 PROCEDURE — G8427 DOCREV CUR MEDS BY ELIG CLIN: HCPCS | Performed by: NURSE PRACTITIONER

## 2022-04-12 RX ORDER — METHYLPREDNISOLONE ACETATE 80 MG/ML
160 INJECTION, SUSPENSION INTRA-ARTICULAR; INTRALESIONAL; INTRAMUSCULAR; SOFT TISSUE ONCE
Status: COMPLETED | OUTPATIENT
Start: 2022-04-12 | End: 2022-04-12

## 2022-04-12 RX ORDER — MELOXICAM 15 MG/1
TABLET ORAL
Qty: 90 TABLET | Refills: 2 | Status: SHIPPED | OUTPATIENT
Start: 2022-04-12

## 2022-04-12 RX ORDER — ALPRAZOLAM 0.25 MG/1
.25-.5 TABLET ORAL 3 TIMES DAILY PRN
Qty: 120 TABLET | Refills: 0 | Status: SHIPPED | OUTPATIENT
Start: 2022-04-12 | End: 2022-07-12 | Stop reason: SDUPTHER

## 2022-04-12 RX ORDER — PREDNISONE 1 MG/1
TABLET ORAL
Qty: 30 TABLET | Refills: 0 | Status: SHIPPED | OUTPATIENT
Start: 2022-04-12 | End: 2022-04-22

## 2022-04-12 RX ADMIN — METHYLPREDNISOLONE ACETATE 160 MG: 80 INJECTION, SUSPENSION INTRA-ARTICULAR; INTRALESIONAL; INTRAMUSCULAR; SOFT TISSUE at 15:15

## 2022-04-12 NOTE — PROGRESS NOTES
Administrations This Visit     methylPREDNISolone acetate (DEPO-MEDROL) injection 160 mg     Admin Date  04/12/2022  15:15 Action  Given Dose  160 mg Route  IntraMUSCular Site  Deltoid Left Administered By  Supriya Matute    Ordering Provider: LEIA Duarte CNP    NDC: [address]-4    Lot#: [id number]    : Zora Cintron    Patient Supplied?: No

## 2022-04-15 ASSESSMENT — ENCOUNTER SYMPTOMS
COUGH: 0
BACK PAIN: 0
ABDOMINAL PAIN: 0
SHORTNESS OF BREATH: 0
CHEST TIGHTNESS: 0
WHEEZING: 0
ABDOMINAL DISTENTION: 0

## 2022-04-15 NOTE — PROGRESS NOTES
[address] [place] [facility] [address]  Dept: [phone]  Dept Fax: [phone]  Loc: [phone]  PROGRESS NOTE      VisitDate: 4/12/2022    Lewis Reinoso is a 73 y.o. male who presents today for:     Chief Complaint   Patient presents with    Elbow Pain     left elbow pain, overuse, req injection    Discuss Medications     previously seen by Glendy Heaton, will you prescribe mobic? Subjective:  Patient presents with complaint of left elbow pain swelling for the past week. history of olecranon bursitis. Reports that he has been fishing daily and will carry fishing tackle long distance. Believes this is caused some inflammation in his left elbow. Patient also requesting a refill of Mobic. Reports that he has been out for several weeks. Patient does complain of steadily worsening joint pain while not taking Mobic. History anxiety, gout, hypertension, arthritis. Patient also requesting refill of Xanax. Mood stable        Review of Systems   Constitutional: Negative for activity change, appetite change, chills, fatigue and fever. Eyes: Negative for visual disturbance. Respiratory: Negative for cough, chest tightness, shortness of breath and wheezing. Cardiovascular: Negative for chest pain, palpitations and leg swelling. Gastrointestinal: Negative for abdominal distention and abdominal pain. Genitourinary: Negative for dysuria. Musculoskeletal: Positive for arthralgias. Negative for back pain and neck pain. Skin: Negative. Negative for rash. Neurological: Negative for dizziness, light-headedness and headaches. Hematological: Negative for adenopathy. Psychiatric/Behavioral: Negative for decreased concentration and dysphoric mood. The patient is nervous/anxious. All other systems reviewed and are negative.     Past Medical History:   Diagnosis Date    Anxiety     Blood circulation, collateral     Gout     Hypertension     Hypotestosteronism 2013    Osteoarthritis     PONV (postoperative nausea and vomiting)       Past Surgical History:   Procedure Laterality Date    APPENDECTOMY      GASTROCNEMIUS RECESSION Right 2019    RIGHT 1ST MET-TARSAL FUSION, GASTROC RECESSION, N-C FUSION, T-N FUSION, STJ FUSION, BMA performed by Alex Salcedo DPM at [address] [place] [address] ([facility])      ABDOMINAL    OTHER SURGICAL HISTORY      excision of scrotal wall lesion     Family History   Problem Relation Age of Onset    Diabetes Maternal Grandmother     Heart Disease Maternal Grandmother         PACEMAKER    Cancer Maternal Grandmother     Breast Cancer Maternal Grandmother     Cancer Mother         liver    High Blood Pressure Neg Hx     Stroke Neg Hx      Social History     Tobacco Use    Smoking status: Former Smoker     Packs/day: 0.25     Years: 7.00     Pack years: 1.75     Types: Cigarettes     Quit date:      Years since quittin.3    Smokeless tobacco: Never Used   Substance Use Topics    Alcohol use: Yes     Comment: occasional      Current Outpatient Medications   Medication Sig Dispense Refill    meloxicam (MOBIC) 15 MG tablet take 1 tablet by mouth once daily 90 tablet 2    ALPRAZolam (XANAX) 0.25 MG tablet Take 1-2 tablets by mouth 3 times daily as needed for Anxiety for up to 30 days. 120 tablet 0    predniSONE (DELTASONE) 5 MG tablet 4 po qd for 3 days, then 3 po qd for 3 days, then 2 po qd for 3 days, then 1 po qd for 3 days 30 tablet 0    lisinopril (PRINIVIL;ZESTRIL) 20 MG tablet take 1 tablet by mouth once daily 90 tablet 3    ibuprofen (ADVIL;MOTRIN) 200 MG tablet Take 600-1,000 mg by mouth every 6 hours as needed for Pain        No current facility-administered medications for this visit.      Allergies   Allergen Reactions    Penicillin G Other (See Comments)    Amoxil [Amoxicillin] Itching    Lactose Diarrhea    Tape Lakesha  Tape] Itching     Health Maintenance Topic Date Due    COVID-19 Vaccine (1) Never done    HIV screen  Never done    DTaP/Tdap/Td vaccine (1 - Tdap) Never done    Diabetes screen  Never done    Shingles Vaccine (1 of 2) Never done    Pneumococcal 65+ years Vaccine (1 - PCV) Never done    AAA screen  Never done   [employer] Visit (AWV)  Never done    Potassium monitoring  12/30/2021    Creatinine monitoring  12/30/2021    Depression Monitoring  06/09/2022    Flu vaccine (Season Ended) 09/01/2022    Colorectal Cancer Screen  10/14/2023    Lipid screen  06/11/2026    Hepatitis C screen  Completed    Hepatitis A vaccine  Aged Out    Hepatitis B vaccine  Aged Out    Hib vaccine  Aged Out    Meningococcal (ACWY) vaccine  Aged Out         Objective:     Physical Exam  Vitals and nursing note reviewed. Constitutional:       Appearance: Normal appearance. He is well-developed. He is not diaphoretic. HENT:      Head: Normocephalic and atraumatic. Not macrocephalic and not microcephalic. Right Ear: Hearing, tympanic membrane, ear canal and external ear normal. No drainage or tenderness. No middle ear effusion. No hemotympanum. Tympanic membrane is not injected, scarred, perforated or bulging. Left Ear: Hearing, tympanic membrane, ear canal and external ear normal. No drainage or tenderness. No middle ear effusion. No hemotympanum. Tympanic membrane is not injected, scarred, perforated or bulging. Nose: No nasal deformity, septal deviation, mucosal edema or rhinorrhea. Right Sinus: No maxillary sinus tenderness or frontal sinus tenderness. Left Sinus: No maxillary sinus tenderness or frontal sinus tenderness. Mouth/Throat:      Mouth: No oral lesions. Dentition: Normal dentition. Does not have dentures. No dental caries or dental abscesses. Pharynx: No oropharyngeal exudate or posterior oropharyngeal erythema. Tonsils: No tonsillar abscesses.    Eyes:      General: Lids are normal. No scleral icterus. Extraocular Movements:      Right eye: Normal extraocular motion. Left eye: Normal extraocular motion. Conjunctiva/sclera: Conjunctivae normal.      Right eye: Right conjunctiva is not injected. Left eye: Left conjunctiva is not injected. Neck:      Thyroid: No thyroid mass or thyromegaly. Vascular: No carotid bruit or JVD. Trachea: Trachea normal.   Cardiovascular:      Rate and Rhythm: Normal rate and regular rhythm. Heart sounds: Normal heart sounds, S1 normal and S2 normal. No murmur heard. No friction rub. No gallop. Pulmonary:      Effort: Pulmonary effort is normal. No respiratory distress. Breath sounds: Normal breath sounds. No wheezing, rhonchi or rales. Chest:      Chest wall: No tenderness. Breasts:      Right: No supraclavicular adenopathy. Left: No supraclavicular adenopathy. Abdominal:      General: Bowel sounds are normal.      Palpations: Abdomen is soft. There is no hepatomegaly, splenomegaly or mass. Tenderness: There is no guarding or rebound. Hernia: No hernia is present. There is no hernia in the ventral area or left inguinal area. Musculoskeletal:         General: Normal range of motion. Left elbow: Swelling present. No effusion. Tenderness present in olecranon process. Cervical back: Normal range of motion and neck supple. No edema or erythema. Normal range of motion. Lymphadenopathy:      Head:      Right side of head: No submental, submandibular, tonsillar, preauricular, posterior auricular or occipital adenopathy. Left side of head: No submental, submandibular, tonsillar, preauricular, posterior auricular or occipital adenopathy. Cervical: No cervical adenopathy. Right cervical: No superficial, deep or posterior cervical adenopathy. Left cervical: No superficial, deep or posterior cervical adenopathy.       Upper Body:      Right upper body: No supraclavicular or pectoral adenopathy. Left upper body: No supraclavicular or pectoral adenopathy. Skin:     General: Skin is warm and dry. Coloration: Skin is not pale. Findings: No bruising, ecchymosis, laceration, lesion or rash. Nails: There is no clubbing. Neurological:      Mental Status: He is alert and oriented to person, place, and time. Cranial Nerves: No cranial nerve deficit. Motor: No abnormal muscle tone. Coordination: Coordination normal.      Deep Tendon Reflexes: Reflexes normal.   Psychiatric:         Speech: Speech normal.         Behavior: Behavior normal.         Thought Content: Thought content normal.         Judgment: Judgment normal.       /64 (Site: Right Upper Arm)   Pulse 69   Temp 98 °F (36.7 °C) (Oral)   Resp 18   Wt 218 lb (98.9 kg)   SpO2 97%   BMI 27.99 kg/m²       Impression/Plan:  1. Bursitis of left elbow, unspecified bursa    2. Situational anxiety    3. Skin ulcer of right foot, limited to breakdown of skin ([address]    4. Olecranon bursitis, left elbow    5. Essential hypertension      Requested Prescriptions     Signed Prescriptions Disp Refills    meloxicam (MOBIC) 15 MG tablet 90 tablet 2     Sig: take 1 tablet by mouth once daily    ALPRAZolam (XANAX) 0.25 MG tablet 120 tablet 0     Sig: Take 1-2 tablets by mouth 3 times daily as needed for Anxiety for up to 30 days.  predniSONE (DELTASONE) 5 MG tablet 30 tablet 0     Si po qd for 3 days, then 3 po qd for 3 days, then 2 po qd for 3 days, then 1 po qd for 3 days     No orders of the defined types were placed in this encounter. Patient giveneducational materials - see patient instructions. Discussed use, benefit, and side effects of prescribed medications. All patient questions answered. Pt voiced understanding. Reviewed health maintenance. Patient agreedwith treatment plan. Follow up as directed. Xanax refilled #120. OARRS report reviewed. Depo-Medrol 160 IM provided in office. Prednisone taper prescribed. Mobic 15 mg 1 p.o. daily refilled. Elbow bursitis information exercises provided.   30 min  Electronically signed by LEIA Benz CNP on 4/15/2022 at 8:25 AM

## 2022-05-27 ENCOUNTER — HOSPITAL ENCOUNTER (EMERGENCY)
Age: 67
Discharge: HOME | End: 2022-05-27
Payer: MEDICARE

## 2022-05-27 VITALS
SYSTOLIC BLOOD PRESSURE: 180 MMHG | DIASTOLIC BLOOD PRESSURE: 85 MMHG | TEMPERATURE: 98 F | HEART RATE: 67 BPM | RESPIRATION RATE: 16 BRPM | OXYGEN SATURATION: 97 %

## 2022-05-27 DIAGNOSIS — S46.002A INJURY OF LEFT ROTATOR CUFF, INITIAL ENCOUNTER: Primary | ICD-10-CM

## 2022-05-27 PROCEDURE — 99213 OFFICE O/P EST LOW 20 MIN: CPT

## 2022-05-27 PROCEDURE — 99212 OFFICE O/P EST SF 10 MIN: CPT | Performed by: EMERGENCY MEDICINE

## 2022-05-27 ASSESSMENT — ENCOUNTER SYMPTOMS
COUGH: 0
SHORTNESS OF BREATH: 0
COLOR CHANGE: 0

## 2022-05-27 ASSESSMENT — PAIN - FUNCTIONAL ASSESSMENT: PAIN_FUNCTIONAL_ASSESSMENT: NONE - DENIES PAIN

## 2022-05-27 NOTE — ED NOTES
To [facility] with complaints of right shoulder pain. States it started last night after he slipped while carrying a bucket. Didn't fall but felt a pop in shoulder. Unable to lift arm up, however if he uses his other hand and lifts arm up he can do that with less pain.       Denice Shane RN  05/27/22 [id number]

## 2022-05-27 NOTE — ED PROVIDER NOTES
[facility]  Urgent Care Encounter       CHIEF COMPLAINT       Chief Complaint   Patient presents with    Shoulder Pain     felt a pop in shoulder, cant lift above head. hurts to move       Nurses Notes reviewed and I agree except as noted in the HPI. HISTORY OF PRESENT ILLNESS   Lili Briscoe is a 73 y.o. male who presents for complaints of left shoulder pain. Patient states that he was walking up a reservoir he will with a bucket of water that had a fish in it. He was going fishing. He stumbled but did not fall. When he lunged forward during the fall, he felt a pulling sensation to his left shoulder. States it felt like he tore something. He has been having pain to the area since. Injury happened yesterday afternoon. Since the injury, the patient has not been able to actively raise his left arm. He could use his other arm to passively raise his left arm, but cannot raise on his own. Complains of pain 6 on a 10 scale. He has been taking Mobic for his pain. HPI    REVIEW OF SYSTEMS     Review of Systems   Constitutional: Negative for chills, fatigue and fever. Respiratory: Negative for cough and shortness of breath. Cardiovascular: Negative for chest pain. Musculoskeletal: Positive for arthralgias (left shoulder). Skin: Negative for color change. PAST MEDICAL HISTORY         Diagnosis Date    Anxiety     Blood circulation, collateral     Gout     Hypertension     Hypotestosteronism 5/24/2013    Osteoarthritis     PONV (postoperative nausea and vomiting)        SURGICALHISTORY     Patient  has a past surgical history that includes Appendectomy; hernia repair (2004); other surgical history (2015); and Gastrocnemius Recession (Right, 12/27/2019).     CURRENT MEDICATIONS       Discharge Medication List as of 5/27/2022  5:19 PM      CONTINUE these medications which have NOT CHANGED    Details   meloxicam (MOBIC) 15 MG tablet take 1 tablet by mouth once daily, Disp-90 tablet, R-2Normal      lisinopril (PRINIVIL;ZESTRIL) 20 MG tablet take 1 tablet by mouth once daily, Disp-90 tablet, R-3Normal      ibuprofen (ADVIL;MOTRIN) 200 MG tablet Take 600-1,000 mg by mouth every 6 hours as needed for Pain Historical Med             ALLERGIES     Patient is is allergic to penicillin g, amoxil [amoxicillin], lactose, and tape [adhesive tape]. Patients   There is no immunization history on file for this patient. FAMILY HISTORY     Patient's family history includes Breast Cancer in his maternal grandmother; Cancer in his maternal grandmother and mother; Diabetes in his maternal grandmother; Heart Disease in his maternal grandmother. SOCIAL HISTORY     Patient  reports that he quit smoking about 42 years ago. His smoking use included cigarettes. He has a 1.75 pack-year smoking history. He has never used smokeless tobacco. He reports current alcohol use. He reports current drug use. Frequency: 7.00 times per week. Drug: Marijuana [place]). PHYSICAL EXAM     ED TRIAGE VITALS  BP: (!) 180/85, Temp: 98 °F (36.7 °C), Heart Rate: 67, Resp: 16, SpO2: 97 %,Estimated body mass index is 27.99 kg/m² as calculated from the following:    Height as of 12/6/21: 6' 2\" (1.88 m). Weight as of 4/12/22: 218 lb (98.9 kg). ,No LMP for male patient. Physical Exam  Constitutional:       General: He is not in acute distress. Appearance: He is normal weight. He is not ill-appearing. HENT:      Mouth/Throat:      Mouth: Mucous membranes are moist.   Cardiovascular:      Rate and Rhythm: Normal rate and regular rhythm. Pulses: Normal pulses. Heart sounds: Normal heart sounds. Pulmonary:      Effort: Pulmonary effort is normal.      Breath sounds: Normal breath sounds. Musculoskeletal:      Right shoulder: Tenderness present. No swelling, deformity or bony tenderness. Decreased strength. Comments: Unable to perform forward flexion. Able to internal and external rotate.   Able to extend left shoulder. Able to abduct to approximately 75 degrees. No significant pain with passive forward flexion   Skin:     General: Skin is warm and dry. Neurological:      General: No focal deficit present. Mental Status: He is alert. Psychiatric:         Mood and Affect: Mood normal.         Behavior: Behavior normal.         DIAGNOSTIC RESULTS     Labs:No results found for this visit on 05/27/22. IMAGING:    No orders to display         EKG:      URGENT CARE COURSE:     Vitals:    05/27/22 1646   BP: (!) 180/85   Pulse: 67   Resp: 16   Temp: 98 °F (36.7 °C)   TempSrc: Temporal   SpO2: 97%       Medications - No data to display         PROCEDURES:  None    FINAL IMPRESSION      1. Injury of left rotator cuff, initial encounter          DISPOSITION/ PLAN     Presents for what is likely a rotator cuff injury of the left shoulder. He is unable to perform forward flexion. I do not palpate any AC separation. There is no swelling of the joint. There was no fall causing significant trauma. There is not appear to be a dislocation. I advised we can perform x-rays but will likely not be diagnostic. Advised he will require evaluation at the [facility]. Advised they may perform x-rays at that appointment. After discussion, patient declines x-ray. He is given exercises to perform at home for rotator cuff injury. Advised to ice the area frequently. Continue Mobic. Tylenol in addition if needed. Follow-up with orthopedic institute next week for reevaluation.       PATIENT REFERRED TO:  MD Wade [address] / [facility] [address]      DISCHARGE MEDICATIONS:  Discharge Medication List as of 5/27/2022  5:19 PM          Discharge Medication List as of 5/27/2022  5:19 PM          Discharge Medication List as of 5/27/2022  5:19 PM          Urbano Libman, APRN - CNP    (Please note that portions of this note were completed with a voice recognition program. Efforts were made to edit the dictations but occasionally words are mis-transcribed.)          LEIA Major - SOCO  05/27/22 [id number]

## 2022-07-11 DIAGNOSIS — F41.8 SITUATIONAL ANXIETY: ICD-10-CM

## 2022-07-11 RX ORDER — ALPRAZOLAM 0.25 MG/1
TABLET ORAL
Qty: 120 TABLET | OUTPATIENT
Start: 2022-07-11

## 2022-07-12 ENCOUNTER — OFFICE VISIT (OUTPATIENT)
Dept: FAMILY MEDICINE CLINIC | Age: 67
End: 2022-07-12
Payer: MEDICARE

## 2022-07-12 VITALS
RESPIRATION RATE: 20 BRPM | OXYGEN SATURATION: 98 % | BODY MASS INDEX: 26.32 KG/M2 | DIASTOLIC BLOOD PRESSURE: 80 MMHG | WEIGHT: 205 LBS | SYSTOLIC BLOOD PRESSURE: 130 MMHG | TEMPERATURE: 98.3 F | HEART RATE: 80 BPM

## 2022-07-12 DIAGNOSIS — U07.1 COVID: ICD-10-CM

## 2022-07-12 DIAGNOSIS — F41.8 SITUATIONAL ANXIETY: ICD-10-CM

## 2022-07-12 DIAGNOSIS — I10 ESSENTIAL HYPERTENSION: Primary | ICD-10-CM

## 2022-07-12 DIAGNOSIS — Z20.822 SUSPECTED COVID-19 VIRUS INFECTION: ICD-10-CM

## 2022-07-12 LAB
Lab: ABNORMAL
QC PASS/FAIL: ABNORMAL
SARS-COV-2 RDRP RESP QL NAA+PROBE: POSITIVE

## 2022-07-12 PROCEDURE — 87635 SARS-COV-2 COVID-19 AMP PRB: CPT | Performed by: NURSE PRACTITIONER

## 2022-07-12 PROCEDURE — 3017F COLORECTAL CA SCREEN DOC REV: CPT | Performed by: NURSE PRACTITIONER

## 2022-07-12 PROCEDURE — 1123F ACP DISCUSS/DSCN MKR DOCD: CPT | Performed by: NURSE PRACTITIONER

## 2022-07-12 PROCEDURE — G8417 CALC BMI ABV UP PARAM F/U: HCPCS | Performed by: NURSE PRACTITIONER

## 2022-07-12 PROCEDURE — 99214 OFFICE O/P EST MOD 30 MIN: CPT | Performed by: NURSE PRACTITIONER

## 2022-07-12 PROCEDURE — 1036F TOBACCO NON-USER: CPT | Performed by: NURSE PRACTITIONER

## 2022-07-12 PROCEDURE — G8427 DOCREV CUR MEDS BY ELIG CLIN: HCPCS | Performed by: NURSE PRACTITIONER

## 2022-07-12 RX ORDER — BENZONATATE 200 MG/1
200 CAPSULE ORAL 3 TIMES DAILY PRN
Qty: 30 CAPSULE | Refills: 0 | Status: SHIPPED | OUTPATIENT
Start: 2022-07-12 | End: 2022-07-19

## 2022-07-12 RX ORDER — ALPRAZOLAM 0.25 MG/1
.25-.5 TABLET ORAL 3 TIMES DAILY PRN
Qty: 120 TABLET | Refills: 0 | Status: SHIPPED | OUTPATIENT
Start: 2022-07-12 | End: 2022-08-11

## 2022-07-12 RX ORDER — PREDNISONE 10 MG/1
10 TABLET ORAL 2 TIMES DAILY
Qty: 10 TABLET | Refills: 0 | Status: SHIPPED | OUTPATIENT
Start: 2022-07-12 | End: 2022-07-17

## 2022-07-12 SDOH — ECONOMIC STABILITY: FOOD INSECURITY: WITHIN THE PAST 12 MONTHS, THE FOOD YOU BOUGHT JUST DIDN'T LAST AND YOU DIDN'T HAVE MONEY TO GET MORE.: NEVER TRUE

## 2022-07-12 SDOH — ECONOMIC STABILITY: FOOD INSECURITY: WITHIN THE PAST 12 MONTHS, YOU WORRIED THAT YOUR FOOD WOULD RUN OUT BEFORE YOU GOT MONEY TO BUY MORE.: NEVER TRUE

## 2022-07-12 ASSESSMENT — PATIENT HEALTH QUESTIONNAIRE - PHQ9
8. MOVING OR SPEAKING SO SLOWLY THAT OTHER PEOPLE COULD HAVE NOTICED. OR THE OPPOSITE, BEING SO FIGETY OR RESTLESS THAT YOU HAVE BEEN MOVING AROUND A LOT MORE THAN USUAL: 0
5. POOR APPETITE OR OVEREATING: 0
2. FEELING DOWN, DEPRESSED OR HOPELESS: 0
SUM OF ALL RESPONSES TO PHQ QUESTIONS 1-9: 1
3. TROUBLE FALLING OR STAYING ASLEEP: 0
SUM OF ALL RESPONSES TO PHQ QUESTIONS 1-9: 1
SUM OF ALL RESPONSES TO PHQ QUESTIONS 1-9: 1
7. TROUBLE CONCENTRATING ON THINGS, SUCH AS READING THE NEWSPAPER OR WATCHING TELEVISION: 0
9. THOUGHTS THAT YOU WOULD BE BETTER OFF DEAD, OR OF HURTING YOURSELF: 0
4. FEELING TIRED OR HAVING LITTLE ENERGY: 1
10. IF YOU CHECKED OFF ANY PROBLEMS, HOW DIFFICULT HAVE THESE PROBLEMS MADE IT FOR YOU TO DO YOUR WORK, TAKE CARE OF THINGS AT HOME, OR GET ALONG WITH OTHER PEOPLE: 0
6. FEELING BAD ABOUT YOURSELF - OR THAT YOU ARE A FAILURE OR HAVE LET YOURSELF OR YOUR FAMILY DOWN: 0
SUM OF ALL RESPONSES TO PHQ QUESTIONS 1-9: 1

## 2022-07-12 ASSESSMENT — SOCIAL DETERMINANTS OF HEALTH (SDOH): HOW HARD IS IT FOR YOU TO PAY FOR THE VERY BASICS LIKE FOOD, HOUSING, MEDICAL CARE, AND HEATING?: NOT VERY HARD

## 2022-07-12 NOTE — PATIENT INSTRUCTIONS
Patient Education        Learning How to Care for Someone Who Has COVID-19  What do you need to know? Most people who get COVID-19 will recover with time and home care. Here aresome things to know if you're caring for someone who's sick.  Treat the symptoms. Common symptoms include a fever, coughing, and feeling short of breath. Urge the person to get extra rest and drink plenty of fluids to replace fluids lostfrom fever. To reduce a fever, offer acetaminophen (Tylenol) or ibuprofen (Advil, Motrin). It may also help with muscle aches. Read and follow all instructions on thelabel.  Watch for signs that the illness is getting worse. The person may need medical care if they're getting sicker (for example, if it's hard to breathe). But call the doctor's office before you go. They cantell you what to do. Call 911 or emergency services if the person has any of these symptoms:  ? Severe trouble breathing or shortness of breath  ? Constant pain or pressure in their chest  ? Confusion, or trouble thinking clearly  ? Pale, gray, or blue-colored skin or lips  Some people are more likely to get very sick and need medical care. Call the doctor as soon as symptoms start or the person tests positive for COVID-19. This is especially important if the person you're caring for is not up to date on their COVID-19 vaccines, is over 72, smokes, or has a serious health problem like asthma, heart disease, diabetes, or an immune system problem. They mayneed medicine to prevent serious illness. How can you protect yourself and others? When you're caring for someone with COVID-19, keep the sick person away from others as much as you can. The virus spreads easily from person to person, so take extra care to avoid catching or spreading the infection. Here are someways to protect yourself and others.  Have the person stay in one room. If you can, give them their own bathroom to use.  Have only one person take care of them. Keep other people--and pets--out of the sickroom.  Have the person wear a well-fitting mask around other people. This includes when anyone is in the room with them or if they leave their room(for example, to go to the bathroom).  Don't share personal items. These include dishes, cups, towels, and bedding.  Wash your hands often and well. Use soap and water, and scrub for at least 20 seconds. This is especially important after you've been around the sick person or touched things they'vetouched. If soap and water aren't handy, use an alcohol-based hand .  Wear a well-fitting mask when caring for someone who is sick. And wear a mask when you're around other people after you've cared for someonewho's sick.  Avoid touching your mouth, nose, and eyes.  Take care with the person's laundry. It's okay to wash the sick person's laundry with yours. If you have them, wear disposable gloves when handling their dirty laundry, and wash your hands well after you touch it. Wash items in the warmest water allowed for the fabrictype, and dry them completely.  Clean high-touch items every day and anytime the sick person touches them. These include doorknobs, light switches, toilets, counters, and remote controls. Use a household disinfectant or a homemade bleach solution. (Follow the directions on the label.) If the sick person has their own room, have themdisinfect it every day.  Avoid having visitors. If you have to have visitors, everyone needs to wear a mask and stay at least 6 feet (2 meters) away from you. And keep the visit as short as possible. To helpprotect family and friends, stay in touch with them only by phone or computer.  If you haven't tested positive for COVID-19 in the last 3 months or aren't up to date on your COVID-19 vaccines, you need to quarantine. This means you need to stay home and away from other people.  If you have questions, go to the . [facility] [facility] website at cdc.gov to check the Quarantine and Isolation Calculator. Where can you learn more? Go to https://chpepiceweb.healthCMGE. org and sign in to your [employer] account. Enter R797 in the [facility] box to learn more about \"Learning How to Care for Someone Who Has COVID-19. \"     If you do not have an account, please click on the \"Sign Up Now\" link. Current as of: May 28, 2022               Content Version: 13.3  © 2006-2022 Healthwise, [employer]. Care instructions adapted under license by [facility] ([facility]). If you have questions about a medical condition or this instruction, always ask your healthcare professional. [place] [address] any warranty or liability for your use of this information. Patient Education        Learning About Being High-Risk for Serious Illness From COVID-19  Who is at high risk? COVID-19 causes a mild illness in many people who have it. But certain thingsmay increase your risk for more serious illness. These include:   Age. ? Babies born premature or who are less than 3year old may be at high risk. ? The risk also increases with age. Older adults are at highest risk.  Asthma, cystic fibrosis, chronic obstructive pulmonary disease (COPD), and other chronic lung diseases.  Vaping or smoking or having a history of smoking.  Serious heart conditions, such as heart failure, coronary artery disease, or high blood pressure.  Tuberculosis (TB).  HIV.  A weakened immune system or taking medicines, such as steroids, that suppress the immune system. This also includes medicines taken because of an organ transplant.  Cancer or getting treatment for cancer.  Neurologic conditions or diseases that involve the nerves and brain, such as stroke, dementia, or cerebral palsy.  Being overweight (obesity).  Diabetes.  Chronic kidney disease.  Liver disease.  Substance use disorders.  Sickle cell disease.  Pregnancy or a recent pregnancy.    Genetic, metabolic, or neurologic problems in children. This includes children who may have many health problems that affect many body systems. These problems may limit how well the child can do routine activities of daily life.  Down syndrome.  Mood disorders, such as depression or schizophrenia. Some people have a higher risk of getting very sick or dying from COVID-19 because of where they live or work. The risk can also be higher if people don't have access to health care. This includes people from certain racial and ethnicminority groups, as well as people with disabilities. This is not a complete list. If you have a chronic health problem, ask your doctor if you should take extra precautions. The more of these things you have, the higher your risk for serious illness. Talk with your doctor about ways tomanage your risk. Should you get the COVID-19 vaccine if you have an underlying health problem? The simple answer is yes. The COVID-19 vaccine is safe and effective for almost everyone. The only people advised not to get it are those who have had a severeallergic reaction to the vaccine's ingredients. Experts recommend getting the vaccine. This is especially true if you have an underlying health problem like diabetes, chronic lung disease, or obesity. Getting infected with COVID-19 can be much worse if you have conditions likethese. If you have a weakened immune system, you may be at higher risk for getting seriously ill with COVID-19. The vaccine may not work as well for you, but itshould still be safe. Talk with your doctor if you have any questions about the vaccine. How can you protect yourself? Follow these guidelines until your doctor tells you it's safe to stop.  Stay up to date on your COVID-19 vaccines.  Talk to your doctor about how many doses you need.  Avoid sick people.    Talk to your doctor to see if you need medicine to lower your risk of getting COVID-19 or getting very sick from COVID-19.  Wear a mask with the best fit, protection, and comfort for you if you go into public areas, especially indoor spaces.  Avoid crowds. And try to stay 6 feet apart from other people. If you have to be in a crowded area, wear a mask. This is important even if you're outside.  Wash your hands often.  Avoid touching your mouth, nose, and eyes.  Improve the airflow when you spend time indoors with people who don't live with you. If you can, open windows and doors. Or you can use a fan to blow air away from people and out a window.  Ask the people you live with or who are in close contact with you to stay up to date on their COVID-19 vaccines.  Ask the people you live with to wear a mask in public areas. This is important even if they're up to date on their COVID-19 vaccines.  Ask people who don't live with you to get a COVID-19 test before visiting with them indoors. If you think you've been exposed, or you're sick, call your doctor as soon asyou can. Your doctor may have you take medicine to help prevent serious illness. Should you get the COVID-19 vaccine if you are planning a pregnancy, are pregnant, were recently pregnant, or are breastfeeding? Experts recommend getting the COVID-19 vaccine if you are pregnant, were recently pregnant, or are breastfeeding. The vaccine is also recommended if you are planning a pregnancy. Talk to your doctor about getting the vaccine. Other vaccines, like the flu vaccine, are safely given in pregnancy and after pregnancy. There is no evidence that vaccines, including the COVID-19 vaccine, cause fertility problems. The risk of problems from the COVID-19 vaccine should be far smaller than the risks to you and your baby from having the infection. Having COVID-19 while pregnant increases your risk of  labor and stillbirth. So gettingthe COVID-19 vaccine is important for you and your baby. Where can you learn more?   Go to https://chpepiceweb.[email or web address] [email or web address] org and sign in to your [employer] account. Enter A131 in the [employer] box to learn more about \"Learning About Being High-Risk for Serious Illness From COVID-19. \"     If you do not have an account, please click on the \"Sign Up Now\" link. Current as of: May 28, 2022               Content Version: 13.3  © 0484-6327 Healthwise, [employer]. Care instructions adapted under license by [facility] ([facility]). If you have questions about a medical condition or this instruction, always ask your healthcare professional. [address] any warranty or liability for your use of this information.

## 2022-07-18 ENCOUNTER — TELEPHONE (OUTPATIENT)
Dept: FAMILY MEDICINE CLINIC | Age: 67
End: 2022-07-18

## 2022-07-18 DIAGNOSIS — Z20.822 ENCOUNTER FOR PREOPERATIVE SCREENING LABORATORY TESTING FOR COVID-19 VIRUS: Primary | ICD-10-CM

## 2022-07-18 DIAGNOSIS — Z01.812 ENCOUNTER FOR PREOPERATIVE SCREENING LABORATORY TESTING FOR COVID-19 VIRUS: Primary | ICD-10-CM

## 2022-07-18 ASSESSMENT — ENCOUNTER SYMPTOMS
ABDOMINAL PAIN: 0
BACK PAIN: 0
WHEEZING: 0
ABDOMINAL DISTENTION: 0
CHEST TIGHTNESS: 0
SHORTNESS OF BREATH: 0
COUGH: 1

## 2022-07-18 NOTE — TELEPHONE ENCOUNTER
Left VM that order for Covid test is put in computer to have done at any [facility] ([facility]) facility.

## 2022-07-18 NOTE — PROGRESS NOTES
[address] [place] [facility] [address]  Dept: [phone]  Dept Fax: [phone]  Loc: [phone]  PROGRESS NOTE      VisitDate: 7/12/2022    Yeni Jacques is a 78 y.o. male who presents today for:     Chief Complaint   Patient presents with    3 Month Follow-Up     Situational Anxiety, HTN. Nasal Congestion     Clear nasal drainage, sneezing, occa cough, denies fever. Sx started 5 days ago. Taking Emerge C and Ensure. Subjective:  3-month routine refill of Xanax history anxiety hypertension. Patient also complains of cough sneezing congestion past 5 days. Aching over-the-counter home remedies minimal relief. Denies chest pain shortness of breath fever        Review of Systems   Constitutional:  Positive for fatigue. Negative for activity change, appetite change, chills and fever. HENT:  Positive for congestion. Eyes:  Negative for visual disturbance. Respiratory:  Positive for cough. Negative for chest tightness, shortness of breath and wheezing. Cardiovascular:  Negative for chest pain, palpitations and leg swelling. Gastrointestinal:  Negative for abdominal distention and abdominal pain. Genitourinary:  Negative for dysuria. Musculoskeletal:  Negative for arthralgias, back pain and neck pain. Skin: Negative. Negative for rash. Neurological:  Negative for dizziness, light-headedness and headaches. Hematological:  Negative for adenopathy. Psychiatric/Behavioral:  Negative for decreased concentration and dysphoric mood. The patient is nervous/anxious. All other systems reviewed and are negative.   Past Medical History:   Diagnosis Date    Anxiety     Blood circulation, collateral     Gout     Hypertension     Hypotestosteronism 5/24/2013    Osteoarthritis     PONV (postoperative nausea and vomiting)       Past Surgical History:   Procedure Laterality Date    APPENDECTOMY      GASTROCNEMIUS RECESSION Right 2019    RIGHT 1ST MET-TARSAL FUSION, GASTROC RECESSION, N-C FUSION, T-N FUSION, STJ FUSION, BMA performed by Morris Dumont DPM at [address]  2004    ABDOMINAL    OTHER SURGICAL HISTORY  2015    excision of scrotal wall lesion     Family History   Problem Relation Age of Onset    Diabetes Maternal Grandmother     Heart Disease Maternal Grandmother         PACEMAKER    Cancer Maternal Grandmother     Breast Cancer Maternal Grandmother     Cancer Mother         liver    High Blood Pressure Neg Hx     Stroke Neg Hx      Social History     Tobacco Use    Smoking status: Former     Packs/day: 0.25     Years: 7.00     Pack years: 1.75     Types: Cigarettes     Quit date:      Years since quittin.5    Smokeless tobacco: Never   Substance Use Topics    Alcohol use: Yes     Comment: occasional      Current Outpatient Medications   Medication Sig Dispense Refill    ALPRAZolam (XANAX) 0.25 MG tablet Take 1-2 tablets by mouth 3 times daily as needed for Anxiety for up to 30 days. 120 tablet 0    benzonatate (TESSALON) 200 MG capsule Take 1 capsule by mouth 3 times daily as needed for Cough 30 capsule 0    meloxicam (MOBIC) 15 MG tablet take 1 tablet by mouth once daily 90 tablet 2    lisinopril (PRINIVIL;ZESTRIL) 20 MG tablet take 1 tablet by mouth once daily 90 tablet 3    ibuprofen (ADVIL;MOTRIN) 200 MG tablet Take 600-1,000 mg by mouth every 6 hours as needed for Pain Prn       No current facility-administered medications for this visit.      Allergies   Allergen Reactions    Penicillin G Other (See Comments)    Amoxil [Amoxicillin] Itching    Lactose Diarrhea    Tape Isai Boss Tape] Itching     Health Maintenance   Topic Date Due    Annual Wellness Visit (AWV)  Never done    COVID-19 Vaccine (1) Never done    DTaP/Tdap/Td vaccine (1 - Tdap) Never done    Diabetes screen  Never done    Shingles vaccine (1 of 2) Never done    Pneumococcal 65+ years Vaccine (1 - PCV) Never done AAA screen  Never done    Prostate Specific Antigen (PSA) Screening or Monitoring  06/11/2022    Flu vaccine (1) 09/01/2022    Depression Monitoring  07/12/2023    Colorectal Cancer Screen  10/14/2023    Lipids  06/11/2026    Hepatitis C screen  Completed    Hepatitis A vaccine  Aged Out    Hepatitis B vaccine  Aged Out    Hib vaccine  Aged Out    Meningococcal (ACWY) vaccine  Aged Out         Objective:     Physical Exam  Vitals and nursing note reviewed. Constitutional:       Appearance: Normal appearance. He is well-developed. He is not diaphoretic. HENT:      Head: Normocephalic and atraumatic. Not macrocephalic and not microcephalic. Right Ear: Hearing, tympanic membrane, ear canal and external ear normal. No drainage or tenderness. No middle ear effusion. No hemotympanum. Tympanic membrane is not injected, scarred, perforated or bulging. Left Ear: Hearing, tympanic membrane, ear canal and external ear normal. No drainage or tenderness. No middle ear effusion. No hemotympanum. Tympanic membrane is not injected, scarred, perforated or bulging. Nose: Mucosal edema and rhinorrhea present. No nasal deformity or septal deviation. Right Sinus: No maxillary sinus tenderness or frontal sinus tenderness. Left Sinus: No maxillary sinus tenderness or frontal sinus tenderness. Mouth/Throat:      Mouth: No oral lesions. Dentition: Normal dentition. Does not have dentures. No dental caries or dental abscesses. Pharynx: No oropharyngeal exudate or posterior oropharyngeal erythema. Tonsils: No tonsillar abscesses. Eyes:      General: Lids are normal. No scleral icterus. Extraocular Movements:      Right eye: Normal extraocular motion. Left eye: Normal extraocular motion. Conjunctiva/sclera: Conjunctivae normal.      Right eye: Right conjunctiva is not injected. Left eye: Left conjunctiva is not injected. Neck:      Thyroid: No thyroid mass or thyromegaly. Vascular: No carotid bruit or JVD. Trachea: Trachea normal.   Cardiovascular:      Rate and Rhythm: Normal rate and regular rhythm. Heart sounds: Normal heart sounds, S1 normal and S2 normal. No murmur heard. No friction rub. No gallop. Pulmonary:      Effort: Pulmonary effort is normal. No respiratory distress. Breath sounds: Normal breath sounds. No wheezing, rhonchi or rales. Chest:      Chest wall: No tenderness. Breasts:     Right: No supraclavicular adenopathy. Left: No supraclavicular adenopathy. Abdominal:      General: Bowel sounds are normal.      Palpations: Abdomen is soft. There is no hepatomegaly, splenomegaly or mass. Tenderness: There is no guarding or rebound. Hernia: No hernia is present. There is no hernia in the ventral area or left inguinal area. Musculoskeletal:         General: No tenderness. Normal range of motion. Cervical back: Normal range of motion and neck supple. No edema or erythema. Normal range of motion. Lymphadenopathy:      Head:      Right side of head: No submental, submandibular, tonsillar, preauricular, posterior auricular or occipital adenopathy. Left side of head: No submental, submandibular, tonsillar, preauricular, posterior auricular or occipital adenopathy. Cervical: No cervical adenopathy. Right cervical: No superficial, deep or posterior cervical adenopathy. Left cervical: No superficial, deep or posterior cervical adenopathy. Upper Body:      Right upper body: No supraclavicular or pectoral adenopathy. Left upper body: No supraclavicular or pectoral adenopathy. Skin:     General: Skin is warm and dry. Coloration: Skin is not pale. Findings: No bruising, ecchymosis, laceration, lesion or rash. Nails: There is no clubbing. Neurological:      Mental Status: He is alert and oriented to person, place, and time. Cranial Nerves: No cranial nerve deficit.       Motor: No abnormal muscle tone. Coordination: Coordination normal.      Deep Tendon Reflexes: Reflexes normal.   Psychiatric:         Speech: Speech normal.         Behavior: Behavior normal.         Thought Content: Thought content normal.         Judgment: Judgment normal.     /80   Pulse 80   Temp 98.3 °F (36.8 °C) (Oral)   Resp 20   Wt 205 lb (93 kg)   SpO2 98%   BMI 26.32 kg/m²   COVID-positive in office. Impression/Plan:  1. Essential hypertension    2. Situational anxiety    3. COVID    4. Suspected COVID-19 virus infection      Requested Prescriptions     Signed Prescriptions Disp Refills    ALPRAZolam (XANAX) 0.25 MG tablet 120 tablet 0     Sig: Take 1-2 tablets by mouth 3 times daily as needed for Anxiety for up to 30 days. benzonatate (TESSALON) 200 MG capsule 30 capsule 0     Sig: Take 1 capsule by mouth 3 times daily as needed for Cough    predniSONE (DELTASONE) 10 MG tablet 10 tablet 0     Sig: Take 1 tablet by mouth 2 times daily for 5 days     Orders Placed This Encounter   Procedures    POCT COVID-19 Rapid, NAAT     Order Specific Question:   Is this test for diagnosis or screening? Answer:   Diagnosis of ill patient     Order Specific Question:   Symptomatic for COVID-19 as defined by CDC? Answer:   Yes     Order Specific Question:   Date of Symptom Onset     Answer:   7/7/2022     Order Specific Question:   Hospitalized for COVID-19? Answer:   No     Order Specific Question:   Admitted to ICU for COVID-19? Answer:   No     Order Specific Question:   Employed in healthcare setting? Answer:   No     Order Specific Question:   Resident in a congregate (group) care setting? Answer:   No     Order Specific Question:   Pregnant: Answer:   No     Order Specific Question:   Previously tested for COVID-19? Answer:   No         Patient giveneducational materials - see patient instructions. Discussed use, benefit, and side effects of prescribed medications.   All patient questions answered. Pt voiced understanding. Reviewed health maintenance. Patient agreedwith treatment plan. Follow up as directed. Xanax 0.92cd9-4 q. 3 times daily #120. OARRS report reviewed. Continue medications as prescribed.  Educational information on above diagnosis  provided per AVS.       30 min  Electronically signed by LEIA Caldwell CNP on 7/18/2022 at 1:37 PM

## 2022-07-18 NOTE — TELEPHONE ENCOUNTER
Patient called stating in order to schedule surgery in [place]- Dr. Christine Solano he needs neg Covid test. He stated he did a home test today and it was negative. Called [facility] and they need official covid test done and negative - not home test.    Melanie Gill for test? Patient needs call back.

## 2022-07-19 ENCOUNTER — HOSPITAL ENCOUNTER (OUTPATIENT)
Age: 67
Discharge: HOME | End: 2022-07-19
Payer: MEDICARE

## 2022-07-19 DIAGNOSIS — Z01.812 ENCOUNTER FOR PREOPERATIVE SCREENING LABORATORY TESTING FOR COVID-19 VIRUS: ICD-10-CM

## 2022-07-19 DIAGNOSIS — Z20.822 ENCOUNTER FOR PREOPERATIVE SCREENING LABORATORY TESTING FOR COVID-19 VIRUS: ICD-10-CM

## 2022-07-19 LAB
INFLUENZA A: NOT DETECTED
INFLUENZA B: NOT DETECTED
SARS-COV-2 RNA, RT PCR: DETECTED

## 2022-07-19 PROCEDURE — 87636 SARSCOV2 & INF A&B AMP PRB: CPT

## 2022-12-21 ENCOUNTER — OFFICE VISIT (OUTPATIENT)
Dept: FAMILY MEDICINE CLINIC | Age: 67
End: 2022-12-21

## 2022-12-21 VITALS
RESPIRATION RATE: 18 BRPM | DIASTOLIC BLOOD PRESSURE: 80 MMHG | BODY MASS INDEX: 27.89 KG/M2 | TEMPERATURE: 97.7 F | OXYGEN SATURATION: 98 % | HEART RATE: 95 BPM | HEIGHT: 74 IN | WEIGHT: 217.3 LBS | SYSTOLIC BLOOD PRESSURE: 138 MMHG

## 2022-12-21 DIAGNOSIS — I10 ESSENTIAL HYPERTENSION: Primary | ICD-10-CM

## 2022-12-21 DIAGNOSIS — F41.8 SITUATIONAL ANXIETY: ICD-10-CM

## 2022-12-21 DIAGNOSIS — Z12.5 SCREENING PSA (PROSTATE SPECIFIC ANTIGEN): ICD-10-CM

## 2022-12-21 DIAGNOSIS — Z00.00 ROUTINE GENERAL MEDICAL EXAMINATION AT A HEALTH CARE FACILITY: ICD-10-CM

## 2022-12-21 RX ORDER — ALPRAZOLAM 0.25 MG/1
0.25 TABLET ORAL 3 TIMES DAILY PRN
Qty: 90 TABLET | Refills: 0 | Status: SHIPPED | OUTPATIENT
Start: 2022-12-21 | End: 2023-01-20

## 2022-12-22 ASSESSMENT — ENCOUNTER SYMPTOMS
CONSTIPATION: 0
COUGH: 0
ABDOMINAL PAIN: 0
SORE THROAT: 0
RHINORRHEA: 0
EYE PAIN: 0
NAUSEA: 0
SINUS PRESSURE: 0
SHORTNESS OF BREATH: 0
DIARRHEA: 0
ABDOMINAL DISTENTION: 0

## 2022-12-22 NOTE — PROGRESS NOTES
[address] [place] Gutiérrez [place] [facility] [address]  Dept: [phone]  Dept Fax: [phone]  Loc: [phone]  PROGRESS NOTE      VisitDate: 12/21/2022    Estela Jung is a 78 y.o. male who presents today for:     Chief Complaint   Patient presents with    Anxiety     Follow up         Subjective:  HPI  Patient comes in follow-up hypertension stable and well-controlled. He has situational anxiety for which he uses as needed Xanax. Prescribing report reviewed he is using it infrequently. Due for annual labs. Review of Systems   Constitutional:  Negative for appetite change and fever. HENT:  Negative for congestion, ear pain, postnasal drip, rhinorrhea, sinus pressure and sore throat. Eyes:  Negative for pain and visual disturbance. Respiratory:  Negative for cough and shortness of breath. Cardiovascular:  Negative for chest pain. Gastrointestinal:  Negative for abdominal distention, abdominal pain, constipation, diarrhea and nausea. Genitourinary:  Negative for dysuria, frequency and urgency. Musculoskeletal:  Negative for arthralgias. Skin:  Negative for rash. Neurological:  Negative for dizziness.    Past Medical History:   Diagnosis Date    Anxiety     Blood circulation, collateral     Gout     Hypertension     Hypotestosteronism 5/24/2013    Osteoarthritis     PONV (postoperative nausea and vomiting)       Past Surgical History:   Procedure Laterality Date    APPENDECTOMY      GASTROCNEMIUS RECESSION Right 12/27/2019    RIGHT 1ST MET-TARSAL FUSION, GASTROC RECESSION, N-C FUSION, T-N FUSION, STJ FUSION, BMA performed by Gregg Guillen DPM at [place] [address]  2004    ABDOMINAL    OTHER SURGICAL HISTORY  2015    excision of scrotal wall lesion     Family History   Problem Relation Age of Onset    Diabetes Maternal Grandmother     Heart Disease Maternal Grandmother         PACEMAKER    Cancer Maternal Grandmother Breast Cancer Maternal Grandmother     Cancer Mother         liver    High Blood Pressure Neg Hx     Stroke Neg Hx      Social History     Tobacco Use    Smoking status: Former     Packs/day: 0.25     Years: 7.00     Pack years: 1.75     Types: Cigarettes     Quit date:      Years since quittin.0    Smokeless tobacco: Never   Substance Use Topics    Alcohol use: Yes     Comment: occasional      Current Outpatient Medications   Medication Sig Dispense Refill    ALPRAZolam (XANAX) 0.25 MG tablet Take 1 tablet by mouth 3 times daily as needed for Anxiety for up to 30 days. Max Daily Amount: 0.75 mg 90 tablet 0    meloxicam (MOBIC) 15 MG tablet take 1 tablet by mouth once daily 90 tablet 2    lisinopril (PRINIVIL;ZESTRIL) 20 MG tablet take 1 tablet by mouth once daily 90 tablet 3     No current facility-administered medications for this visit. Allergies   Allergen Reactions    Penicillin G Other (See Comments)    Amoxil [Amoxicillin] Itching    Lactose Diarrhea    Tape Lourena Main Tape] Itching     Health Maintenance   Topic Date Due    COVID-19 Vaccine (1) Never done    DTaP/Tdap/Td vaccine (1 - Tdap) Never done    Diabetes screen  Never done    Shingles vaccine (1 of 2) Never done    Pneumococcal 65+ years Vaccine (1 - PCV) Never done    AAA screen  Never done    Annual Wellness Visit (AWV)  Never done    Flu vaccine (1) Never done    Depression Monitoring  2023    Colorectal Cancer Screen  10/14/2023    Lipids  2026    Hepatitis C screen  Completed    Hepatitis A vaccine  Aged Out    Hib vaccine  Aged Out    Meningococcal (ACWY) vaccine  Aged Out         Objective:     Physical Exam  Constitutional:       General: He is not in acute distress. Appearance: He is well-developed. He is not diaphoretic. HENT:      Head: Normocephalic and atraumatic.       Right Ear: External ear normal.      Left Ear: External ear normal.   Eyes:      Conjunctiva/sclera: Conjunctivae normal.   Neck: Vascular: No JVD. Cardiovascular:      Rate and Rhythm: Normal rate and regular rhythm. Heart sounds: Normal heart sounds. Pulmonary:      Effort: Pulmonary effort is normal.      Breath sounds: Normal breath sounds. No wheezing or rales. Musculoskeletal:         General: No tenderness. Skin:     General: Skin is warm and dry. Coloration: Skin is not pale. Neurological:      Mental Status: He is alert and oriented to person, place, and time. /80   Pulse 95   Temp 97.7 °F (36.5 °C)   Resp 18   Ht 6' 2\" (1.88 m)   Wt 217 lb 4.8 oz (98.6 kg)   SpO2 98%   BMI 27.90 kg/m²       Impression/Plan:  1. Essential hypertension    2. Situational anxiety    3. Routine general medical examination at a health care facility    4. Screening PSA (prostate specific antigen)      Requested Prescriptions     Signed Prescriptions Disp Refills    ALPRAZolam (XANAX) 0.25 MG tablet 90 tablet 0     Sig: Take 1 tablet by mouth 3 times daily as needed for Anxiety for up to 30 days. Max Daily Amount: 0.75 mg     Orders Placed This Encounter   Procedures    Lipid Panel     Standing Status:   Future     Standing Expiration Date:   12/21/2023     Order Specific Question:   Is Patient Fasting?/# of Hours     Answer:   yes/12    Comprehensive Metabolic Panel     Standing Status:   Future     Standing Expiration Date:   12/21/2023    CBC with Auto Differential     Standing Status:   Future     Standing Expiration Date:   12/21/2023    PSA Screening     Standing Status:   Future     Standing Expiration Date:   12/21/2023       Patient giveneducational materials - see patient instructions. Discussed use, benefit, and side effects of prescribed medications. All patient questions answered. Pt voiced understanding. Reviewed health maintenance. Patient agreedwith treatment plan. Follow up as directed. **This report has been created using voice recognition software.  It may contain minor errorswhich are inherent in voice recognition technology. **       Electronically signed by Shama Richmond MD on 12/22/2022 at 8:43 AM

## 2023-01-16 RX ORDER — MELOXICAM 15 MG/1
TABLET ORAL
Qty: 90 TABLET | Refills: 2 | Status: SHIPPED | OUTPATIENT
Start: 2023-01-16

## 2023-01-23 DIAGNOSIS — I10 ESSENTIAL HYPERTENSION: ICD-10-CM

## 2023-01-23 RX ORDER — LISINOPRIL 20 MG/1
TABLET ORAL
Qty: 90 TABLET | Refills: 3 | Status: SHIPPED | OUTPATIENT
Start: 2023-01-23

## 2023-01-23 NOTE — TELEPHONE ENCOUNTER
Last appointment this department: 12/21/2022  Next appointment this department: Visit date not found

## 2023-04-07 DIAGNOSIS — F41.8 SITUATIONAL ANXIETY: ICD-10-CM

## 2023-04-10 RX ORDER — ALPRAZOLAM 0.25 MG/1
TABLET ORAL
Qty: 90 TABLET | OUTPATIENT
Start: 2023-04-10

## 2023-04-24 ENCOUNTER — OFFICE VISIT (OUTPATIENT)
Dept: FAMILY MEDICINE CLINIC | Age: 68
End: 2023-04-24
Payer: MEDICARE

## 2023-04-24 VITALS
OXYGEN SATURATION: 98 % | BODY MASS INDEX: 28.06 KG/M2 | DIASTOLIC BLOOD PRESSURE: 74 MMHG | WEIGHT: 218.6 LBS | SYSTOLIC BLOOD PRESSURE: 162 MMHG | TEMPERATURE: 98.2 F | HEART RATE: 88 BPM | HEIGHT: 74 IN | RESPIRATION RATE: 16 BRPM

## 2023-04-24 DIAGNOSIS — M10.9 ACUTE GOUT OF RIGHT ELBOW, UNSPECIFIED CAUSE: Primary | ICD-10-CM

## 2023-04-24 DIAGNOSIS — F41.8 SITUATIONAL ANXIETY: ICD-10-CM

## 2023-04-24 DIAGNOSIS — L97.511 SKIN ULCER OF RIGHT FOOT, LIMITED TO BREAKDOWN OF SKIN (HCC): ICD-10-CM

## 2023-04-24 PROCEDURE — 3074F SYST BP LT 130 MM HG: CPT | Performed by: NURSE PRACTITIONER

## 2023-04-24 PROCEDURE — 96372 THER/PROPH/DIAG INJ SC/IM: CPT | Performed by: NURSE PRACTITIONER

## 2023-04-24 PROCEDURE — G8427 DOCREV CUR MEDS BY ELIG CLIN: HCPCS | Performed by: NURSE PRACTITIONER

## 2023-04-24 PROCEDURE — G8417 CALC BMI ABV UP PARAM F/U: HCPCS | Performed by: NURSE PRACTITIONER

## 2023-04-24 PROCEDURE — 3078F DIAST BP <80 MM HG: CPT | Performed by: NURSE PRACTITIONER

## 2023-04-24 PROCEDURE — 3017F COLORECTAL CA SCREEN DOC REV: CPT | Performed by: NURSE PRACTITIONER

## 2023-04-24 PROCEDURE — 99214 OFFICE O/P EST MOD 30 MIN: CPT | Performed by: NURSE PRACTITIONER

## 2023-04-24 PROCEDURE — 1036F TOBACCO NON-USER: CPT | Performed by: NURSE PRACTITIONER

## 2023-04-24 PROCEDURE — 1123F ACP DISCUSS/DSCN MKR DOCD: CPT | Performed by: NURSE PRACTITIONER

## 2023-04-24 RX ORDER — CEPHALEXIN 500 MG/1
500 CAPSULE ORAL 4 TIMES DAILY
Qty: 40 CAPSULE | Refills: 0 | Status: SHIPPED | OUTPATIENT
Start: 2023-04-24 | End: 2023-05-04

## 2023-04-24 RX ORDER — ALPRAZOLAM 0.25 MG/1
0.25 TABLET ORAL 3 TIMES DAILY PRN
Qty: 90 TABLET | Refills: 0 | Status: SHIPPED | OUTPATIENT
Start: 2023-04-24 | End: 2023-05-24

## 2023-04-24 RX ORDER — METHYLPREDNISOLONE ACETATE 80 MG/ML
120 INJECTION, SUSPENSION INTRA-ARTICULAR; INTRALESIONAL; INTRAMUSCULAR; SOFT TISSUE ONCE
Status: COMPLETED | OUTPATIENT
Start: 2023-04-24 | End: 2023-04-24

## 2023-04-24 RX ORDER — PREDNISONE 10 MG/1
TABLET ORAL
Qty: 30 TABLET | Refills: 0 | Status: SHIPPED | OUTPATIENT
Start: 2023-04-24 | End: 2023-05-04

## 2023-04-24 RX ADMIN — METHYLPREDNISOLONE ACETATE 120 MG: 80 INJECTION, SUSPENSION INTRA-ARTICULAR; INTRALESIONAL; INTRAMUSCULAR; SOFT TISSUE at 12:21

## 2023-04-24 SDOH — ECONOMIC STABILITY: FOOD INSECURITY: WITHIN THE PAST 12 MONTHS, THE FOOD YOU BOUGHT JUST DIDN'T LAST AND YOU DIDN'T HAVE MONEY TO GET MORE.: NEVER TRUE

## 2023-04-24 SDOH — ECONOMIC STABILITY: HOUSING INSECURITY
IN THE LAST 12 MONTHS, WAS THERE A TIME WHEN YOU DID NOT HAVE A STEADY PLACE TO SLEEP OR SLEPT IN A SHELTER (INCLUDING NOW)?: NO

## 2023-04-24 SDOH — ECONOMIC STABILITY: FOOD INSECURITY: WITHIN THE PAST 12 MONTHS, YOU WORRIED THAT YOUR FOOD WOULD RUN OUT BEFORE YOU GOT MONEY TO BUY MORE.: NEVER TRUE

## 2023-04-24 SDOH — ECONOMIC STABILITY: INCOME INSECURITY: HOW HARD IS IT FOR YOU TO PAY FOR THE VERY BASICS LIKE FOOD, HOUSING, MEDICAL CARE, AND HEATING?: NOT HARD AT ALL

## 2023-04-24 ASSESSMENT — PATIENT HEALTH QUESTIONNAIRE - PHQ9
SUM OF ALL RESPONSES TO PHQ QUESTIONS 1-9: 0
10. IF YOU CHECKED OFF ANY PROBLEMS, HOW DIFFICULT HAVE THESE PROBLEMS MADE IT FOR YOU TO DO YOUR WORK, TAKE CARE OF THINGS AT HOME, OR GET ALONG WITH OTHER PEOPLE: 0
SUM OF ALL RESPONSES TO PHQ QUESTIONS 1-9: 0
9. THOUGHTS THAT YOU WOULD BE BETTER OFF DEAD, OR OF HURTING YOURSELF: 0
SUM OF ALL RESPONSES TO PHQ QUESTIONS 1-9: 0
SUM OF ALL RESPONSES TO PHQ9 QUESTIONS 1 & 2: 0
5. POOR APPETITE OR OVEREATING: 0
2. FEELING DOWN, DEPRESSED OR HOPELESS: 0
6. FEELING BAD ABOUT YOURSELF - OR THAT YOU ARE A FAILURE OR HAVE LET YOURSELF OR YOUR FAMILY DOWN: 0
1. LITTLE INTEREST OR PLEASURE IN DOING THINGS: 0
4. FEELING TIRED OR HAVING LITTLE ENERGY: 0
7. TROUBLE CONCENTRATING ON THINGS, SUCH AS READING THE NEWSPAPER OR WATCHING TELEVISION: 0
3. TROUBLE FALLING OR STAYING ASLEEP: 0
SUM OF ALL RESPONSES TO PHQ QUESTIONS 1-9: 0
8. MOVING OR SPEAKING SO SLOWLY THAT OTHER PEOPLE COULD HAVE NOTICED. OR THE OPPOSITE, BEING SO FIGETY OR RESTLESS THAT YOU HAVE BEEN MOVING AROUND A LOT MORE THAN USUAL: 0

## 2023-04-25 ASSESSMENT — ENCOUNTER SYMPTOMS
ABDOMINAL DISTENTION: 0
COUGH: 0
BACK PAIN: 0
SHORTNESS OF BREATH: 0
CHEST TIGHTNESS: 0
ABDOMINAL PAIN: 0
WHEEZING: 0

## 2023-04-25 NOTE — PROGRESS NOTES
Administrations This Visit       methylPREDNISolone acetate (DEPO-MEDROL) injection 120 mg       Admin Date  04/24/2023  12:21 Action  Given Dose  120 mg Route  IntraMUSCular Site  Deltoid Right Administered By  Quinn Rodriguez    Ordering Provider: LEIA Espino CNP    NDC: 1586-4851-62    Lot#: [id number]    : Nabor Mohamud.     Patient Supplied?: No
oriented to person, place, and time. Cranial Nerves: No cranial nerve deficit. Motor: No abnormal muscle tone. Coordination: Coordination normal.      Deep Tendon Reflexes: Reflexes normal.   Psychiatric:         Mood and Affect: Mood normal.         Speech: Speech normal.         Behavior: Behavior normal.         Thought Content: Thought content normal.         Judgment: Judgment normal.     BP (!) 162/74 (Site: Left Upper Arm)   Pulse 88   Temp 98.2 °F (36.8 °C) (Oral)   Resp 16   Ht 6' 2\" (1.88 m)   Wt 218 lb 9.6 oz (99.2 kg)   SpO2 98%   BMI 28.07 kg/m²       Impression/Plan:  1. Acute gout of right elbow, unspecified cause    2. Situational anxiety    3. Skin ulcer of right foot, limited to breakdown of skin (HCC)      Requested Prescriptions     Signed Prescriptions Disp Refills    ALPRAZolam (XANAX) 0.25 MG tablet 90 tablet 0     Sig: Take 1 tablet by mouth 3 times daily as needed for Anxiety for up to 30 days. Max Daily Amount: 0.75 mg    predniSONE (DELTASONE) 10 MG tablet 30 tablet 0     Si po qd for 3 days, then 3 po qd for 3 days, then 2 po qd for 3 days, then 1 po qd for 3 days    cephALEXin (KEFLEX) 500 MG capsule 40 capsule 0     Sig: Take 1 capsule by mouth 4 times daily for 10 days     Orders Placed This Encounter   Procedures    Uric Acid     Standing Status:   Future     Standing Expiration Date:   2024   Xanax refilled 0.25 3 times daily as needed #90. OARRS report reviewed. Prednisone taper prescribed Keflex 500 4 times daily for 10 days prescribed . Depo-Medrol 120 IM provided in office today. Low purine diet    Patient giveneducational materials - see patient instructions. Discussed use, benefit, and side effects of prescribed medications. All patient questions answered. Pt voiced understanding. Reviewed health maintenance. Patient agreedwith treatment plan. Follow up as directed.   Uric acid ordered as well as previous labs  30 min       Electronically

## 2023-11-06 ENCOUNTER — OFFICE VISIT (OUTPATIENT)
Dept: FAMILY MEDICINE CLINIC | Age: 68
End: 2023-11-06
Payer: MEDICARE

## 2023-11-06 VITALS
DIASTOLIC BLOOD PRESSURE: 80 MMHG | BODY MASS INDEX: 27.46 KG/M2 | HEIGHT: 74 IN | WEIGHT: 214 LBS | SYSTOLIC BLOOD PRESSURE: 140 MMHG | RESPIRATION RATE: 16 BRPM | HEART RATE: 105 BPM | OXYGEN SATURATION: 96 %

## 2023-11-06 DIAGNOSIS — M10.9 GOUTY ARTHRITIS OF LEFT HAND: ICD-10-CM

## 2023-11-06 DIAGNOSIS — I10 ESSENTIAL HYPERTENSION: Primary | ICD-10-CM

## 2023-11-06 DIAGNOSIS — M10.9 GOUTY ARTHRITIS OF LEFT GREAT TOE: ICD-10-CM

## 2023-11-06 DIAGNOSIS — M70.21 OLECRANON BURSITIS OF RIGHT ELBOW: ICD-10-CM

## 2023-11-06 DIAGNOSIS — F41.8 SITUATIONAL ANXIETY: ICD-10-CM

## 2023-11-06 DIAGNOSIS — M10.9 ACUTE GOUT OF LEFT FOOT, UNSPECIFIED CAUSE: ICD-10-CM

## 2023-11-06 PROCEDURE — G8417 CALC BMI ABV UP PARAM F/U: HCPCS | Performed by: NURSE PRACTITIONER

## 2023-11-06 PROCEDURE — 96372 THER/PROPH/DIAG INJ SC/IM: CPT | Performed by: NURSE PRACTITIONER

## 2023-11-06 PROCEDURE — 3078F DIAST BP <80 MM HG: CPT | Performed by: NURSE PRACTITIONER

## 2023-11-06 PROCEDURE — 1123F ACP DISCUSS/DSCN MKR DOCD: CPT | Performed by: NURSE PRACTITIONER

## 2023-11-06 PROCEDURE — 3074F SYST BP LT 130 MM HG: CPT | Performed by: NURSE PRACTITIONER

## 2023-11-06 PROCEDURE — 3017F COLORECTAL CA SCREEN DOC REV: CPT | Performed by: NURSE PRACTITIONER

## 2023-11-06 PROCEDURE — G8484 FLU IMMUNIZE NO ADMIN: HCPCS | Performed by: NURSE PRACTITIONER

## 2023-11-06 PROCEDURE — G8427 DOCREV CUR MEDS BY ELIG CLIN: HCPCS | Performed by: NURSE PRACTITIONER

## 2023-11-06 PROCEDURE — 1036F TOBACCO NON-USER: CPT | Performed by: NURSE PRACTITIONER

## 2023-11-06 PROCEDURE — 99214 OFFICE O/P EST MOD 30 MIN: CPT | Performed by: NURSE PRACTITIONER

## 2023-11-06 RX ORDER — ALPRAZOLAM 0.25 MG/1
0.25 TABLET ORAL 3 TIMES DAILY PRN
Qty: 90 TABLET | Refills: 0 | Status: SHIPPED | OUTPATIENT
Start: 2023-11-06 | End: 2023-12-06

## 2023-11-06 RX ORDER — CEPHALEXIN 500 MG/1
500 CAPSULE ORAL 4 TIMES DAILY
Qty: 40 CAPSULE | Refills: 0 | Status: SHIPPED | OUTPATIENT
Start: 2023-11-06 | End: 2023-11-16

## 2023-11-06 RX ORDER — METHYLPREDNISOLONE ACETATE 80 MG/ML
120 INJECTION, SUSPENSION INTRA-ARTICULAR; INTRALESIONAL; INTRAMUSCULAR; SOFT TISSUE ONCE
Status: COMPLETED | OUTPATIENT
Start: 2023-11-06 | End: 2023-11-06

## 2023-11-06 RX ORDER — PREDNISONE 10 MG/1
TABLET ORAL
Qty: 30 TABLET | Refills: 0 | Status: SHIPPED | OUTPATIENT
Start: 2023-11-06 | End: 2023-11-16

## 2023-11-06 RX ORDER — CELECOXIB 100 MG/1
100 CAPSULE ORAL 2 TIMES DAILY
Qty: 60 CAPSULE | Refills: 5 | Status: SHIPPED | OUTPATIENT
Start: 2023-11-06

## 2023-11-06 RX ADMIN — METHYLPREDNISOLONE ACETATE 120 MG: 80 INJECTION, SUSPENSION INTRA-ARTICULAR; INTRALESIONAL; INTRAMUSCULAR; SOFT TISSUE at 15:19

## 2023-11-06 NOTE — PROGRESS NOTES
Administrations This Visit       methylPREDNISolone acetate (DEPO-MEDROL) injection 120 mg       Admin Date  11/06/2023  15:19 Action  Given Dose  120 mg Route  IntraMUSCular Site  Ventrogluteal Right Administered By  Mary Gilbert CMA ([facility])    Ordering Provider: LEIA Taylor CNP    NDC: 3398-9496-10    Lot#: [id number]    : Maxi Vero.     Patient Supplied?: No

## 2023-11-07 NOTE — PROGRESS NOTES
[address] MEDICINE  [address] [facility] [address]  Dept: [phone]  Dept Fax: [phone]  Loc: [phone]  PROGRESS NOTE      VisitDate: 11/6/2023    Chance Segal is a 80 y.o. male who presents today for:     Chief Complaint   Patient presents with    Arthritis     Right hand is flaring up/right arm and elbow. Started about 1 1/2 weeks ago. Gout     Gouty arthritis of left great toe. Subjective:  Patient presents with complaint of gouty arthritis gout flare right hand and elbow as well as left great toe past week and a half. Denies any injury. History of anxiety gout hypertension arthritis. Patient did not get labs previously ordered. Requesting steroid therapy. Review of Systems   Musculoskeletal:  Positive for arthralgias. Psychiatric/Behavioral:  The patient is nervous/anxious. All other systems reviewed and are negative.     Past Medical History:   Diagnosis Date    Anxiety     Blood circulation, collateral     Gout     Hypertension     Hypotestosteronism 5/24/2013    Osteoarthritis     PONV (postoperative nausea and vomiting)       Past Surgical History:   Procedure Laterality Date    APPENDECTOMY      GASTROCNEMIUS RECESSION Right 12/27/2019    RIGHT 1ST MET-TARSAL FUSION, GASTROC RECESSION, N-C FUSION, T-N FUSION, STJ FUSION, BMA performed by Bolisa Mitchell DPM at [address] [place] [address]  2004    ABDOMINAL    OTHER SURGICAL HISTORY  2015    excision of scrotal wall lesion     Family History   Problem Relation Age of Onset    Diabetes Maternal Grandmother     Heart Disease Maternal Grandmother         PACEMAKER    Cancer Maternal Grandmother     Breast Cancer Maternal Grandmother     Cancer Mother         liver    High Blood Pressure Neg Hx     Stroke Neg Hx      Social History     Tobacco Use    Smoking status: Former     Packs/day: 0.25     Years: 7.00     Additional pack years: 0.00     Total pack years: 1.75

## 2023-11-20 ENCOUNTER — HOSPITAL ENCOUNTER (OUTPATIENT)
Age: 68
Discharge: HOME | End: 2023-11-20
Payer: MEDICARE

## 2023-11-20 DIAGNOSIS — M10.9 GOUTY ARTHRITIS OF LEFT GREAT TOE: ICD-10-CM

## 2023-11-20 DIAGNOSIS — M10.9 ACUTE GOUT OF LEFT FOOT, UNSPECIFIED CAUSE: ICD-10-CM

## 2023-11-20 LAB
CRP SERPL-MCNC: < 0.3 MG/DL (ref 0–1)
ERYTHROCYTE [SEDIMENTATION RATE] IN BLOOD BY WESTERGREN METHOD: 3 MM/HR (ref 0–10)
URATE SERPL-MCNC: 8.1 MG/DL (ref 3.7–7)

## 2023-11-20 PROCEDURE — 86140 C-REACTIVE PROTEIN: CPT

## 2023-11-20 PROCEDURE — 85651 RBC SED RATE NONAUTOMATED: CPT

## 2023-11-20 PROCEDURE — 84550 ASSAY OF BLOOD/URIC ACID: CPT

## 2023-11-20 PROCEDURE — 36415 COLL VENOUS BLD VENIPUNCTURE: CPT

## 2024-01-11 ENCOUNTER — HOSPITAL ENCOUNTER (OUTPATIENT)
Dept: GENERAL RADIOLOGY | Age: 69
Discharge: HOME | End: 2024-01-11
Payer: MEDICARE

## 2024-01-11 ENCOUNTER — HOSPITAL ENCOUNTER (OUTPATIENT)
Age: 69
Discharge: HOME | End: 2024-01-11
Payer: MEDICARE

## 2024-01-11 ENCOUNTER — OFFICE VISIT (OUTPATIENT)
Dept: FAMILY MEDICINE CLINIC | Age: 69
End: 2024-01-11

## 2024-01-11 VITALS
DIASTOLIC BLOOD PRESSURE: 82 MMHG | WEIGHT: 225.9 LBS | SYSTOLIC BLOOD PRESSURE: 134 MMHG | HEART RATE: 78 BPM | OXYGEN SATURATION: 98 % | TEMPERATURE: 99 F | BODY MASS INDEX: 29 KG/M2

## 2024-01-11 DIAGNOSIS — M65.9 SYNOVITIS OF FINGER: ICD-10-CM

## 2024-01-11 DIAGNOSIS — M25.50 POLYARTHRALGIA: ICD-10-CM

## 2024-01-11 DIAGNOSIS — M25.50 POLYARTHRALGIA: Primary | ICD-10-CM

## 2024-01-11 DIAGNOSIS — F41.8 SITUATIONAL ANXIETY: ICD-10-CM

## 2024-01-11 DIAGNOSIS — R52 PAIN: ICD-10-CM

## 2024-01-11 LAB
ALBUMIN SERPL BCG-MCNC: 4.4 G/DL (ref 3.5–5.1)
ALP SERPL-CCNC: 153 U/L (ref 38–126)
ALT SERPL W/O P-5'-P-CCNC: 27 U/L (ref 11–66)
ANION GAP SERPL CALC-SCNC: 11 MEQ/L (ref 8–16)
AST SERPL-CCNC: 22 U/L (ref 5–40)
BASOPHILS ABSOLUTE: 0.1 THOU/MM3 (ref 0–0.1)
BASOPHILS NFR BLD AUTO: 0.9 %
BILIRUB SERPL-MCNC: 0.8 MG/DL (ref 0.3–1.2)
BUN SERPL-MCNC: 13 MG/DL (ref 7–22)
CALCIUM SERPL-MCNC: 10.2 MG/DL (ref 8.5–10.5)
CHLORIDE SERPL-SCNC: 101 MEQ/L (ref 98–111)
CO2 SERPL-SCNC: 26 MEQ/L (ref 23–33)
CREAT SERPL-MCNC: 0.6 MG/DL (ref 0.4–1.2)
CRP SERPL-MCNC: 2.25 MG/DL (ref 0–1)
DEPRECATED RDW RBC AUTO: 43.2 FL (ref 35–45)
EOSINOPHIL NFR BLD AUTO: 1.2 %
EOSINOPHILS ABSOLUTE: 0.1 THOU/MM3 (ref 0–0.4)
ERYTHROCYTE [DISTWIDTH] IN BLOOD BY AUTOMATED COUNT: 13.8 % (ref 11.5–14.5)
ERYTHROCYTE [SEDIMENTATION RATE] IN BLOOD BY WESTERGREN METHOD: 15 MM/HR (ref 0–10)
GFR SERPL CREATININE-BSD FRML MDRD: > 60 ML/MIN/1.73M2
GLUCOSE SERPL-MCNC: 99 MG/DL (ref 70–108)
HCT VFR BLD AUTO: 43.7 % (ref 42–52)
HGB BLD-MCNC: 13.9 GM/DL (ref 14–18)
IMM GRANULOCYTES # BLD AUTO: 0.05 THOU/MM3 (ref 0–0.07)
IMM GRANULOCYTES NFR BLD AUTO: 0.4 %
LYMPHOCYTES ABSOLUTE: 2.7 THOU/MM3 (ref 1–4.8)
LYMPHOCYTES NFR BLD AUTO: 23.5 %
MCH RBC QN AUTO: 27.3 PG (ref 26–33)
MCHC RBC AUTO-ENTMCNC: 31.8 GM/DL (ref 32.2–35.5)
MCV RBC AUTO: 85.7 FL (ref 80–94)
MONOCYTES ABSOLUTE: 0.7 THOU/MM3 (ref 0.4–1.3)
MONOCYTES NFR BLD AUTO: 6.6 %
NEUTROPHILS NFR BLD AUTO: 67.4 %
NRBC BLD AUTO-RTO: 0 /100 WBC
PLATELET # BLD AUTO: 333 THOU/MM3 (ref 130–400)
PMV BLD AUTO: 9.8 FL (ref 9.4–12.4)
POTASSIUM SERPL-SCNC: 4.3 MEQ/L (ref 3.5–5.2)
PROT SERPL-MCNC: 8.1 G/DL (ref 6.1–8)
RBC # BLD AUTO: 5.1 MILL/MM3 (ref 4.7–6.1)
SEGMENTED NEUTROPHILS ABSOLUTE COUNT: 7.6 THOU/MM3 (ref 1.8–7.7)
SODIUM SERPL-SCNC: 138 MEQ/L (ref 135–145)
URATE SERPL-MCNC: 6.8 MG/DL (ref 3.7–7)
WBC # BLD AUTO: 11.3 THOU/MM3 (ref 4.8–10.8)

## 2024-01-11 PROCEDURE — 85025 COMPLETE CBC W/AUTO DIFF WBC: CPT

## 2024-01-11 PROCEDURE — 86039 ANTINUCLEAR ANTIBODIES (ANA): CPT

## 2024-01-11 PROCEDURE — 84550 ASSAY OF BLOOD/URIC ACID: CPT

## 2024-01-11 PROCEDURE — 73130 X-RAY EXAM OF HAND: CPT

## 2024-01-11 PROCEDURE — 36415 COLL VENOUS BLD VENIPUNCTURE: CPT

## 2024-01-11 PROCEDURE — 86038 ANTINUCLEAR ANTIBODIES: CPT

## 2024-01-11 PROCEDURE — 80053 COMPREHEN METABOLIC PANEL: CPT

## 2024-01-11 PROCEDURE — 85651 RBC SED RATE NONAUTOMATED: CPT

## 2024-01-11 PROCEDURE — 86140 C-REACTIVE PROTEIN: CPT

## 2024-01-11 RX ORDER — PREDNISONE 10 MG/1
TABLET ORAL
Qty: 30 TABLET | Refills: 0 | Status: SHIPPED | OUTPATIENT
Start: 2024-01-11 | End: 2024-01-21

## 2024-01-11 RX ORDER — METHYLPREDNISOLONE ACETATE 80 MG/ML
160 INJECTION, SUSPENSION INTRA-ARTICULAR; INTRALESIONAL; INTRAMUSCULAR; SOFT TISSUE ONCE
Status: COMPLETED | OUTPATIENT
Start: 2024-01-11 | End: 2024-01-11

## 2024-01-11 RX ORDER — HYDROCODONE BITARTRATE AND ACETAMINOPHEN 5; 325 MG/1; MG/1
1 TABLET ORAL EVERY 6 HOURS PRN
Qty: 28 TABLET | Refills: 0 | Status: SHIPPED | OUTPATIENT
Start: 2024-01-11 | End: 2024-01-18

## 2024-01-11 RX ORDER — ALPRAZOLAM 0.25 MG/1
0.25 TABLET ORAL 3 TIMES DAILY PRN
Qty: 90 TABLET | Refills: 0 | Status: SHIPPED | OUTPATIENT
Start: 2024-01-11 | End: 2024-02-10

## 2024-01-11 RX ADMIN — METHYLPREDNISOLONE ACETATE 160 MG: 80 INJECTION, SUSPENSION INTRA-ARTICULAR; INTRALESIONAL; INTRAMUSCULAR; SOFT TISSUE at 11:01

## 2024-01-11 ASSESSMENT — PATIENT HEALTH QUESTIONNAIRE - PHQ9
1. LITTLE INTEREST OR PLEASURE IN DOING THINGS: 1
SUM OF ALL RESPONSES TO PHQ QUESTIONS 1-9: 1
SUM OF ALL RESPONSES TO PHQ QUESTIONS 1-9: 1
2. FEELING DOWN, DEPRESSED OR HOPELESS: 0
SUM OF ALL RESPONSES TO PHQ QUESTIONS 1-9: 1
SUM OF ALL RESPONSES TO PHQ9 QUESTIONS 1 & 2: 1
SUM OF ALL RESPONSES TO PHQ QUESTIONS 1-9: 1

## 2024-01-12 ENCOUNTER — TELEPHONE (OUTPATIENT)
Dept: FAMILY MEDICINE CLINIC | Age: 69
End: 2024-01-12

## 2024-01-12 NOTE — TELEPHONE ENCOUNTER
----- Message from LEIA Paulino CNP sent at 1/12/2024  8:55 AM EST -----  Uric acid 6.8 lowered from 8.1-month ago.  Continue current meds

## 2024-01-13 LAB — NUCLEAR IGG SER QL IA: DETECTED

## 2024-01-13 ASSESSMENT — ENCOUNTER SYMPTOMS
EYE PAIN: 0
SHORTNESS OF BREATH: 0
CONSTIPATION: 0
RHINORRHEA: 0
SORE THROAT: 0
SINUS PRESSURE: 0
DIARRHEA: 0
COUGH: 0
ABDOMINAL DISTENTION: 0
ABDOMINAL PAIN: 0
NAUSEA: 0

## 2024-01-13 NOTE — PROGRESS NOTES
Administrations This Visit       methylPREDNISolone acetate (DEPO-MEDROL) injection 160 mg       Admin Date  01/11/2024  11:01 Action  Given Dose  160 mg Route  IntraMUSCular Site  Dorsogluteal Right Administered By  Lisa Duarte CMA ([facility])    Ordering Provider: Adarsh Smyth MD    NDC: [address]-8    Lot#: [id number]    : AMNEAL [employer]    Patient Supplied?: No                  
20 MG tablet take 1 tablet by mouth once daily 90 tablet 3    celecoxib (CELEBREX) 100 MG capsule Take 1 capsule by mouth 2 times daily (Patient not taking: Reported on 1/11/2024) 60 capsule 5     No current facility-administered medications for this visit.     Allergies   Allergen Reactions    Penicillin G Other (See Comments)    Amoxil [Amoxicillin] Itching    Lactose Diarrhea    Tape [Adhesive Tape] Itching     Health Maintenance   Topic Date Due    COVID-19 Vaccine (1) Never done    DTaP/Tdap/Td vaccine (1 - Tdap) Never done    Shingles vaccine (1 of 2) Never done    Respiratory Syncytial Virus (RSV) Pregnant or age 60 yrs+ (1 - 1-dose 60+ series) Never done    Pneumococcal 65+ years Vaccine (1 - PCV) Never done    AAA screen  Never done    Flu vaccine (1) Never done    Colorectal Cancer Screen  10/14/2023    Annual Wellness Visit (Medicare)  Never done    Depression Screen  01/11/2025    Lipids  06/11/2026    Hepatitis C screen  Completed    Hepatitis A vaccine  Aged Out    Hepatitis B vaccine  Aged Out    Hib vaccine  Aged Out    Polio vaccine  Aged Out    Meningococcal (ACWY) vaccine  Aged Out    Depression Monitoring  Discontinued    Prostate Specific Antigen (PSA) Screening or Monitoring  Discontinued         Objective:     Physical Exam  Constitutional:       General: He is not in acute distress.     Appearance: He is well-developed. He is not diaphoretic.   HENT:      Head: Normocephalic and atraumatic.      Right Ear: External ear normal.      Left Ear: External ear normal.   Eyes:      Conjunctiva/sclera: Conjunctivae normal.   Neck:      Vascular: No JVD.   Cardiovascular:      Rate and Rhythm: Normal rate and regular rhythm.      Heart sounds: Normal heart sounds.   Pulmonary:      Effort: Pulmonary effort is normal.      Breath sounds: Normal breath sounds. No wheezing or rales.   Musculoskeletal:         General: Swelling, tenderness and deformity present.      Comments: Redness and swelling of the

## 2024-01-14 LAB
ANA PAT SER IF-IMP: ABNORMAL
ANA PAT SER IF-IMP: ABNORMAL
NUCLEAR IGG SER QL IF: DETECTED
NUCLEAR IGG TITR SER IF: ABNORMAL {TITER}

## 2024-01-28 DIAGNOSIS — I10 ESSENTIAL HYPERTENSION: ICD-10-CM

## 2024-01-29 RX ORDER — LISINOPRIL 20 MG/1
TABLET ORAL
Qty: 90 TABLET | Refills: 3 | Status: SHIPPED | OUTPATIENT
Start: 2024-01-29

## 2024-03-29 DIAGNOSIS — F41.8 SITUATIONAL ANXIETY: ICD-10-CM

## 2024-04-01 RX ORDER — ALPRAZOLAM 0.25 MG/1
0.25 TABLET ORAL 3 TIMES DAILY PRN
Qty: 90 TABLET | Refills: 0 | Status: SHIPPED | OUTPATIENT
Start: 2024-04-01 | End: 2024-04-04 | Stop reason: SDUPTHER

## 2024-04-04 ENCOUNTER — OFFICE VISIT (OUTPATIENT)
Dept: FAMILY MEDICINE CLINIC | Age: 69
End: 2024-04-04

## 2024-04-04 VITALS
DIASTOLIC BLOOD PRESSURE: 76 MMHG | BODY MASS INDEX: 28.49 KG/M2 | HEART RATE: 84 BPM | HEIGHT: 74 IN | RESPIRATION RATE: 16 BRPM | SYSTOLIC BLOOD PRESSURE: 126 MMHG | WEIGHT: 222 LBS

## 2024-04-04 DIAGNOSIS — M25.50 POLYARTHRALGIA: ICD-10-CM

## 2024-04-04 DIAGNOSIS — M10.9 ACUTE GOUT OF LEFT ANKLE, UNSPECIFIED CAUSE: ICD-10-CM

## 2024-04-04 DIAGNOSIS — R70.0 ELEVATED SED RATE: ICD-10-CM

## 2024-04-04 DIAGNOSIS — M10.9 GOUTY ARTHRITIS OF LEFT ANKLE: Primary | ICD-10-CM

## 2024-04-04 DIAGNOSIS — R79.82 CRP ELEVATED: ICD-10-CM

## 2024-04-04 DIAGNOSIS — R76.8 ANA POSITIVE: ICD-10-CM

## 2024-04-04 DIAGNOSIS — L97.511 SKIN ULCER OF RIGHT FOOT, LIMITED TO BREAKDOWN OF SKIN (HCC): ICD-10-CM

## 2024-04-04 DIAGNOSIS — F41.8 SITUATIONAL ANXIETY: ICD-10-CM

## 2024-04-04 RX ORDER — ALPRAZOLAM 0.25 MG/1
0.25 TABLET ORAL 3 TIMES DAILY PRN
Qty: 90 TABLET | Refills: 0 | Status: SHIPPED | OUTPATIENT
Start: 2024-04-04 | End: 2024-05-04

## 2024-04-04 RX ORDER — METHYLPREDNISOLONE ACETATE 80 MG/ML
160 INJECTION, SUSPENSION INTRA-ARTICULAR; INTRALESIONAL; INTRAMUSCULAR; SOFT TISSUE ONCE
Status: COMPLETED | OUTPATIENT
Start: 2024-04-04 | End: 2024-04-04

## 2024-04-04 RX ORDER — CEPHALEXIN 500 MG/1
500 CAPSULE ORAL 4 TIMES DAILY
Qty: 40 CAPSULE | Refills: 0 | Status: SHIPPED | OUTPATIENT
Start: 2024-04-04 | End: 2024-04-14

## 2024-04-04 RX ORDER — PREDNISONE 10 MG/1
TABLET ORAL
Qty: 30 TABLET | Refills: 0 | Status: SHIPPED | OUTPATIENT
Start: 2024-04-04 | End: 2024-04-14

## 2024-04-04 RX ADMIN — METHYLPREDNISOLONE ACETATE 160 MG: 80 INJECTION, SUSPENSION INTRA-ARTICULAR; INTRALESIONAL; INTRAMUSCULAR; SOFT TISSUE at 11:16

## 2024-04-04 ASSESSMENT — ENCOUNTER SYMPTOMS
BACK PAIN: 0
COUGH: 0
SHORTNESS OF BREATH: 0
CHEST TIGHTNESS: 0
WHEEZING: 0
ABDOMINAL DISTENTION: 0

## 2024-04-04 NOTE — PROGRESS NOTES
Administrations This Visit       methylPREDNISolone acetate (DEPO-MEDROL) injection 160 mg       Admin Date  04/04/2024  11:16 Action  Given Dose  160 mg Route  IntraMUSCular Site  Ventrogluteal Right Administered By  Sania Mccrathy CMA ([facility])    Ordering Provider: Elias Christian APRN - CNP    NDC: 5122-7120-81    Lot#: [id number]    : [employer] [place]    Patient Supplied?: No

## 2024-04-04 NOTE — PROGRESS NOTES
[facility] PROFESSIONAL SERVS  [facility]  [address] [address]  Dept: [phone]  Dept Fax: [phone]  Loc: [phone]  PROGRESS NOTE      VisitDate: 4/4/2024    Lewis Samayoa is a 68 y.o. male who presents today for:     Chief Complaint   Patient presents with    Gout     Gout left ankle, pain and swelling.         Subjective:  Patient presents with complaint of gouty arthritis flareup left ankle past 5 days.  Denies any specific injury.  Does complain of exquisite tenderness swelling redness.  Patient reports taking over-the-counter arthritic herbal supplementation with benefit.  History anxiety gallop elevated sed rate elevated CRP positive ESTEFANY hypertension.          Review of Systems   Constitutional:  Negative for activity change, appetite change, chills, fatigue and fever.   Eyes:  Negative for visual disturbance.   Respiratory:  Negative for cough, chest tightness, shortness of breath and wheezing.    Cardiovascular:  Negative for chest pain, palpitations and leg swelling.   Gastrointestinal:  Negative for abdominal distention and abdominal pain.   Genitourinary:  Negative for dysuria.   Musculoskeletal:  Positive for arthralgias. Negative for back pain and neck pain.   Skin: Negative.  Negative for rash.   Neurological:  Negative for dizziness, light-headedness and headaches.   Hematological:  Negative for adenopathy.   Psychiatric/Behavioral:  Negative for decreased concentration and dysphoric mood. The patient is nervous/anxious.    All other systems reviewed and are negative.    Past Medical History:   Diagnosis Date    Anxiety     Blood circulation, collateral     Gout     Hypertension     Hypotestosteronism 5/24/2013    Osteoarthritis     PONV (postoperative nausea and vomiting)       Past Surgical History:   Procedure Laterality Date    APPENDECTOMY      GASTROCNEMIUS RECESSION Right 12/27/2019    RIGHT 1ST MET-TARSAL FUSION, GASTROC RECESSION,

## 2024-09-04 ENCOUNTER — OFFICE VISIT (OUTPATIENT)
Dept: FAMILY MEDICINE CLINIC | Age: 69
End: 2024-09-04

## 2024-09-04 ENCOUNTER — HOSPITAL ENCOUNTER (OUTPATIENT)
Dept: INTERVENTIONAL RADIOLOGY/VASCULAR | Age: 69
Discharge: HOME | End: 2024-09-06
Attending: FAMILY MEDICINE
Payer: MEDICARE

## 2024-09-04 VITALS
DIASTOLIC BLOOD PRESSURE: 72 MMHG | SYSTOLIC BLOOD PRESSURE: 136 MMHG | WEIGHT: 219 LBS | OXYGEN SATURATION: 96 % | RESPIRATION RATE: 18 BRPM | HEART RATE: 87 BPM | TEMPERATURE: 98.6 F | BODY MASS INDEX: 28.12 KG/M2

## 2024-09-04 DIAGNOSIS — R60.0 LEG EDEMA, LEFT: ICD-10-CM

## 2024-09-04 DIAGNOSIS — R60.0 LEG EDEMA, LEFT: Primary | ICD-10-CM

## 2024-09-04 DIAGNOSIS — F41.8 SITUATIONAL ANXIETY: ICD-10-CM

## 2024-09-04 PROCEDURE — 93971 EXTREMITY STUDY: CPT

## 2024-09-04 RX ORDER — ALPRAZOLAM 0.25 MG
0.25 TABLET ORAL 3 TIMES DAILY PRN
Qty: 90 TABLET | Refills: 0 | Status: SHIPPED | OUTPATIENT
Start: 2024-09-04 | End: 2024-10-04

## 2024-09-04 RX ORDER — PREDNISONE 10 MG/1
TABLET ORAL
Qty: 30 TABLET | Refills: 0 | Status: SHIPPED | OUTPATIENT
Start: 2024-09-04 | End: 2024-09-14

## 2024-09-04 SDOH — ECONOMIC STABILITY: FOOD INSECURITY: WITHIN THE PAST 12 MONTHS, YOU WORRIED THAT YOUR FOOD WOULD RUN OUT BEFORE YOU GOT MONEY TO BUY MORE.: NEVER TRUE

## 2024-09-04 SDOH — ECONOMIC STABILITY: INCOME INSECURITY: HOW HARD IS IT FOR YOU TO PAY FOR THE VERY BASICS LIKE FOOD, HOUSING, MEDICAL CARE, AND HEATING?: NOT HARD AT ALL

## 2024-09-04 SDOH — ECONOMIC STABILITY: FOOD INSECURITY: WITHIN THE PAST 12 MONTHS, THE FOOD YOU BOUGHT JUST DIDN'T LAST AND YOU DIDN'T HAVE MONEY TO GET MORE.: NEVER TRUE

## 2024-11-25 DIAGNOSIS — I10 ESSENTIAL HYPERTENSION: ICD-10-CM

## 2024-11-25 DIAGNOSIS — F41.8 SITUATIONAL ANXIETY: ICD-10-CM

## 2024-11-25 RX ORDER — ALPRAZOLAM 0.25 MG/1
TABLET ORAL
Qty: 90 TABLET | Refills: 0 | Status: SHIPPED | OUTPATIENT
Start: 2024-11-25 | End: 2024-12-25

## 2024-11-25 RX ORDER — LISINOPRIL 20 MG/1
20 TABLET ORAL DAILY
Qty: 90 TABLET | Refills: 3 | Status: SHIPPED | OUTPATIENT
Start: 2024-11-25

## 2024-12-09 ENCOUNTER — TELEPHONE (OUTPATIENT)
Dept: FAMILY MEDICINE CLINIC | Age: 69
End: 2024-12-09

## 2024-12-09 NOTE — TELEPHONE ENCOUNTER
Patient called back and decided to schedule for tomorrow at 11:45 with you, wants to know if you still want to send something in? He is really concerned with the odor.

## 2024-12-09 NOTE — TELEPHONE ENCOUNTER
Asking for antibiotic for wound on right foot, says it smells. I told him he would need an appointment to have it cultured but he declined due to no insurance coverage. Wound is on outside of the foot in the middle. His whole foot is sore. He is scrubbing it with Hibiclens. He is also putting silver wound gel on it. Will have coverage after 1-1-25.     Cristy Coulter  Call patient back

## 2024-12-10 ENCOUNTER — OFFICE VISIT (OUTPATIENT)
Dept: FAMILY MEDICINE CLINIC | Age: 69
End: 2024-12-10

## 2024-12-10 ENCOUNTER — TELEPHONE (OUTPATIENT)
Dept: WOUND CARE | Age: 69
End: 2024-12-10

## 2024-12-10 VITALS
HEART RATE: 114 BPM | DIASTOLIC BLOOD PRESSURE: 72 MMHG | OXYGEN SATURATION: 98 % | SYSTOLIC BLOOD PRESSURE: 140 MMHG | BODY MASS INDEX: 27.96 KG/M2 | TEMPERATURE: 98.4 F | WEIGHT: 217.8 LBS

## 2024-12-10 DIAGNOSIS — I73.9 PVD (PERIPHERAL VASCULAR DISEASE) (HCC): ICD-10-CM

## 2024-12-10 DIAGNOSIS — L97.512 ULCER OF RIGHT FOOT WITH FAT LAYER EXPOSED (HCC): Primary | ICD-10-CM

## 2024-12-10 DIAGNOSIS — L97.521 ULCER OF LEFT FOOT, LIMITED TO BREAKDOWN OF SKIN (HCC): ICD-10-CM

## 2024-12-10 RX ORDER — HYDROCODONE BITARTRATE AND ACETAMINOPHEN 5; 325 MG/1; MG/1
1 TABLET ORAL EVERY 6 HOURS PRN
Qty: 28 TABLET | Refills: 0 | Status: SHIPPED | OUTPATIENT
Start: 2024-12-10 | End: 2024-12-17

## 2024-12-12 ENCOUNTER — TELEPHONE (OUTPATIENT)
Dept: FAMILY MEDICINE CLINIC | Age: 69
End: 2024-12-12

## 2024-12-12 LAB
BACTERIA SPEC AEROBE CULT: ABNORMAL
BACTERIA SPEC AEROBE CULT: ABNORMAL
GRAM STN SPEC: ABNORMAL
ORGANISM: ABNORMAL

## 2024-12-12 NOTE — TELEPHONE ENCOUNTER
Culture positive for Aeromonas recommend Ceftin 200 mg twice a day for 10 days keep appointment at wound clinic

## 2024-12-13 RX ORDER — CEFUROXIME AXETIL 250 MG/1
250 TABLET ORAL 2 TIMES DAILY
Qty: 20 TABLET | Refills: 0 | Status: SHIPPED | OUTPATIENT
Start: 2024-12-13 | End: 2024-12-23

## 2024-12-13 NOTE — TELEPHONE ENCOUNTER
Spoke with wife, Viki, she was notified of culture result and new antibiotic. This needs sent to Cristy Coulter. She will make sure he keeps wound clinic appointment.

## 2024-12-14 ASSESSMENT — ENCOUNTER SYMPTOMS
SHORTNESS OF BREATH: 0
ABDOMINAL DISTENTION: 0
NAUSEA: 0
DIARRHEA: 0
CONSTIPATION: 0
SORE THROAT: 0
EYE PAIN: 0
RHINORRHEA: 0
ABDOMINAL PAIN: 0
SINUS PRESSURE: 0
COUGH: 0
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
## 2024-12-18 ENCOUNTER — HOSPITAL ENCOUNTER (OUTPATIENT)
Dept: INTERVENTIONAL RADIOLOGY/VASCULAR | Age: 69
Discharge: HOME | End: 2024-12-20
Attending: FAMILY MEDICINE
Payer: MEDICARE

## 2024-12-18 DIAGNOSIS — L97.521 ULCER OF LEFT FOOT, LIMITED TO BREAKDOWN OF SKIN (HCC): ICD-10-CM

## 2024-12-18 DIAGNOSIS — L97.512 ULCER OF RIGHT FOOT WITH FAT LAYER EXPOSED (HCC): ICD-10-CM

## 2024-12-18 DIAGNOSIS — I73.9 PVD (PERIPHERAL VASCULAR DISEASE) (HCC): ICD-10-CM

## 2024-12-18 PROCEDURE — 93925 LOWER EXTREMITY STUDY: CPT

## 2024-12-18 RX ORDER — HYDROCODONE BITARTRATE AND ACETAMINOPHEN 5; 325 MG/1; MG/1
1 TABLET ORAL EVERY 6 HOURS PRN
Qty: 28 TABLET | Refills: 0 | Status: SHIPPED | OUTPATIENT
Start: 2024-12-18 | End: 2024-12-25

## 2024-12-26 DIAGNOSIS — L97.521 ULCER OF LEFT FOOT, LIMITED TO BREAKDOWN OF SKIN (HCC): ICD-10-CM

## 2024-12-26 DIAGNOSIS — I73.9 PVD (PERIPHERAL VASCULAR DISEASE) (HCC): ICD-10-CM

## 2024-12-26 DIAGNOSIS — L97.512 ULCER OF RIGHT FOOT WITH FAT LAYER EXPOSED (HCC): Primary | ICD-10-CM

## 2024-12-26 RX ORDER — HYDROCODONE BITARTRATE AND ACETAMINOPHEN 5; 325 MG/1; MG/1
1 TABLET ORAL EVERY 6 HOURS PRN
Qty: 28 TABLET | Refills: 0 | Status: SHIPPED | OUTPATIENT
Start: 2024-12-26 | End: 2025-01-02

## 2024-12-26 RX ORDER — HYDROCODONE BITARTRATE AND ACETAMINOPHEN 5; 325 MG/1; MG/1
1 TABLET ORAL EVERY 6 HOURS PRN
COMMUNITY
End: 2024-12-26 | Stop reason: SDUPTHER

## 2024-12-26 NOTE — TELEPHONE ENCOUNTER
Pt states he cannot get into the foot doctor until 1/7/24. Pt needs a refill until he can get in for that appt.    LOV 12/10/24

## 2025-01-03 DIAGNOSIS — I73.9 PVD (PERIPHERAL VASCULAR DISEASE) (HCC): ICD-10-CM

## 2025-01-03 DIAGNOSIS — L97.512 ULCER OF RIGHT FOOT WITH FAT LAYER EXPOSED (HCC): ICD-10-CM

## 2025-01-03 DIAGNOSIS — L97.521 ULCER OF LEFT FOOT, LIMITED TO BREAKDOWN OF SKIN (HCC): ICD-10-CM

## 2025-01-03 RX ORDER — HYDROCODONE BITARTRATE AND ACETAMINOPHEN 5; 325 MG/1; MG/1
1 TABLET ORAL EVERY 6 HOURS PRN
Qty: 28 TABLET | Refills: 0 | Status: SHIPPED | OUTPATIENT
Start: 2025-01-03 | End: 2025-01-10

## 2025-01-07 ENCOUNTER — HOSPITAL ENCOUNTER (OUTPATIENT)
Dept: WOUND CARE | Age: 70
Discharge: HOME | End: 2025-01-07
Attending: PODIATRIST
Payer: COMMERCIAL

## 2025-01-07 DIAGNOSIS — I87.2 VENOUS INSUFFICIENCY OF BOTH LOWER EXTREMITIES: ICD-10-CM

## 2025-01-07 DIAGNOSIS — L97.511 VENOUS STASIS ULCER OF OTHER PART OF RIGHT FOOT LIMITED TO BREAKDOWN OF SKIN WITHOUT VARICOSE VEINS (HCC): Primary | ICD-10-CM

## 2025-01-07 DIAGNOSIS — I87.2 VENOUS STASIS ULCER OF OTHER PART OF RIGHT FOOT LIMITED TO BREAKDOWN OF SKIN WITHOUT VARICOSE VEINS (HCC): Primary | ICD-10-CM

## 2025-01-07 PROCEDURE — 99213 OFFICE O/P EST LOW 20 MIN: CPT

## 2025-01-07 RX ORDER — SODIUM HYPOCHLORITE 1.25 MG/ML
SOLUTION TOPICAL DAILY
Qty: 473 ML | Refills: 0 | Status: SHIPPED | OUTPATIENT
Start: 2025-01-07

## 2025-01-07 ASSESSMENT — PAIN SCALES - GENERAL: PAINLEVEL_OUTOF10: 6

## 2025-01-07 ASSESSMENT — PAIN DESCRIPTION - LOCATION: LOCATION: FOOT

## 2025-01-07 ASSESSMENT — PAIN DESCRIPTION - DESCRIPTORS: DESCRIPTORS: ACHING

## 2025-01-07 ASSESSMENT — PAIN DESCRIPTION - ORIENTATION: ORIENTATION: RIGHT

## 2025-01-07 NOTE — PROGRESS NOTES
Teaching Note:    I was present with the resident during the visit.  I discussed the case with the resident and agree with the findings and the plan as documented in the residents note.    Mateus Johnson DPM, FACFAS  Podiatric Medicine & Surgery       [facility]        
20.06 cm^2 01/07/25 1324   Wound Volume (cm^3) 12.036 cm^3 01/07/25 1324   Wound Assessment Slough;Granulation tissue;Devitalized tissue 01/07/25 1324   Drainage Amount Large (50-75% saturated) 01/07/25 1324   Drainage Description Serosanguinous;Yellow 01/07/25 1324   Odor Moderate 01/07/25 1324   Doreen-wound Assessment Dry/flaky;Blanchable erythema;Maceration 01/07/25 1324   Margins Attached edges 01/07/25 1324   Wound Thickness Description not for Pressure Injury Full thickness 01/07/25 1324   Number of days: 0     Incision 12/27/19 Foot Anterior;Right (Active)   Number of days: 1838       Supplies Requested :        *DISPENSE AS WRITTEN*    WOUND #: 1   PRIMARY DRESSING:  Other: Triad cream   Cover and Secure with: 2X2 gauze pad  4X4 gauze pad  ABD pad  Bulky roll gauze  Ace wrap 4 inch double long     FREQUENCY OF DRESSING CHANGES:  Daily       ADDITIONAL ITEMS:  [] Gloves Small  [] Gloves Medium [x] Gloves Large [] Gloves XLarge  [] Tape 1\" [x] Tape 2\" [] Tape 3\"  [x] Medipore Tape  [x] Saline  [] Skin Prep   [] Adhesive Remover   [x] Cotton Tip Applicators   [] Other:    Patient Wound(s) Debrided: [x] Yes 1/7/25   [] No    Debridement Type: Mechanical     Patient currently being seen by Home Health: [] Yes   [x] No    Duration for needed supplies:  []15  [x]30  []60  []90 Days        Electronically signed by Katiana Bragg RN on 1/7/2025 at 1:48 PM     Provider Information:      PROVIDER'S NAME: Mateus Johnson DPM    NPI: [id number]          
examined and evaluated  Explained nature and location of wound to patient.  Explained that this is venous overload and would not heal unless good edema control is achieved.  Explained with patient that we will begin enzymatic debridement with quarter strength Dakin's apply daily wet-to-dry, with Triad cream periwound to prevent skin irritation, and good Ace compression.  Patient will change  dressings daily  Prescription for quarter strength Dakin solution sent to patient's pharmacy  Will send patient for updated x-rays of the right foot  He will follow-up in 3 to 4 weeks      Antibiotics: No    Follow up: 3-4 weeks    Please see attached Discharge Instructions    Written patient dismissal instructions given to patient and signed by patient or POA.           Patient Instructions   Visit Discharge/Physician Orders:  - Prescription for Dakins solution sent into pharmacy.  - Right foot xray ordered today.    Wound Location: Right foot    Dressing orders:     1) Gather wound care supplies and arrange on clean table.     2) Wash your hands with soap and water or use alcohol based hand  for 20 seconds (sing \"Happy Birthday\" twice).    3) Cleanse wounds with normal saline or wound cleanser and gauze. Pat dry with clean gauze.    4) Right foot - Apply triad cream to skin around the wound to protect it. Apply dakins moistened gauze to wound, cover with and ABD pad. Wrap with roll gauze and ace bandage from base of toes to bend of knee. Change daily.     Keep all dressings clean & dry.    Follow up visit:  3 Weeks - Tuesday January 28th at 10:45 am    Supplies:  - Mechanical Debridement was completed today by using a saline and gauze.     Duration of dressings: 30 days    We have sent your supply order to the following company: [employer] Phone # [phone]   If you don't receive the items you were expecting or don't know what the items are that you received, call the company where the order was sent.   If you are

## 2025-01-07 NOTE — PATIENT INSTRUCTIONS
Visit Discharge/Physician Orders:  - Prescription for Dakins solution sent into pharmacy.  - Right foot xray ordered today.    Wound Location: Right foot    Dressing orders:     1) Gather wound care supplies and arrange on clean table.     2) Wash your hands with soap and water or use alcohol based hand  for 20 seconds (sing \"Happy Birthday\" twice).    3) Cleanse wounds with normal saline or wound cleanser and gauze. Pat dry with clean gauze.    4) Right foot - Apply triad cream to skin around the wound to protect it. Apply dakins moistened gauze to wound, cover with and ABD pad. Wrap with roll gauze and ace bandage from base of toes to bend of knee. Change daily.     Keep all dressings clean & dry.    Follow up visit:  3 Weeks - Tuesday January 28th at 10:45 am    Supplies:  - Mechanical Debridement was completed today by using a saline and gauze.     Duration of dressings: 30 days    We have sent your supply order to the following company: [employer] Phone # [phone]   If you don't receive the items you were expecting or don't know what the items are that you received, call the company where the order was sent.   If you are unable to obtain wound supplies, continue to use the supplies you have available until you are able to reach us. It is most important to keep the wound covered at all times.  It is YOUR responsibility to make sure that supplies are re-ordered before you run out. Re-order telephone numbers are included in each package.     Keep next scheduled appointment. Please give 24 hour notice if unable to keep appointment. [phone]    If you experience any of the following, please call the Wound Care Service during business hours: Monday through Friday 8:00 am - 4:30 pm  ([phone]).   *Increase in pain   *Temperature over 101   *Increase in drainage from your wound or a foul odor   *Uncontrolled swelling   *Need for compression bandage changes due to slippage, breakthrough drainage    If

## 2025-01-07 NOTE — PLAN OF CARE
Problem: Wound:  Goal: Will show signs of wound healing; wound closure and no evidence of infection  Description: Will show signs of wound healing; wound closure and no evidence of infection  Outcome: Progressing   Patient presents to wound clinic for evaluation and treatment of right foot wound. Discharge instructions/dressing orders per AVS. Follow up appointment scheduled.    Care plan reviewed with patient.  Patient verbalize understanding of the plan of care and contribute to goal setting.

## 2025-01-08 VITALS
SYSTOLIC BLOOD PRESSURE: 144 MMHG | TEMPERATURE: 97.9 F | HEART RATE: 100 BPM | RESPIRATION RATE: 18 BRPM | OXYGEN SATURATION: 94 % | DIASTOLIC BLOOD PRESSURE: 80 MMHG

## 2025-01-28 ENCOUNTER — HOSPITAL ENCOUNTER (OUTPATIENT)
Dept: WOUND CARE | Age: 70
Discharge: HOME | End: 2025-01-28
Attending: PODIATRIST
Payer: COMMERCIAL

## 2025-01-28 ENCOUNTER — HOSPITAL ENCOUNTER (OUTPATIENT)
Dept: GENERAL RADIOLOGY | Age: 70
Discharge: HOME | End: 2025-01-28
Payer: COMMERCIAL

## 2025-01-28 ENCOUNTER — HOSPITAL ENCOUNTER (OUTPATIENT)
Age: 70
Discharge: HOME | End: 2025-01-28
Payer: COMMERCIAL

## 2025-01-28 VITALS
TEMPERATURE: 96.4 F | HEART RATE: 88 BPM | RESPIRATION RATE: 18 BRPM | SYSTOLIC BLOOD PRESSURE: 164 MMHG | DIASTOLIC BLOOD PRESSURE: 86 MMHG | OXYGEN SATURATION: 96 %

## 2025-01-28 DIAGNOSIS — L97.511 VENOUS STASIS ULCER OF OTHER PART OF RIGHT FOOT LIMITED TO BREAKDOWN OF SKIN WITHOUT VARICOSE VEINS (HCC): ICD-10-CM

## 2025-01-28 DIAGNOSIS — I87.2 VENOUS (PERIPHERAL) INSUFFICIENCY: ICD-10-CM

## 2025-01-28 DIAGNOSIS — L97.919 VENOUS ULCER OF RIGHT LOWER EXTREMITY WITHOUT VARICOSE VEINS (HCC): ICD-10-CM

## 2025-01-28 DIAGNOSIS — I73.9 PVD (PERIPHERAL VASCULAR DISEASE) (HCC): ICD-10-CM

## 2025-01-28 DIAGNOSIS — I87.2 VENOUS STASIS ULCER OF OTHER PART OF RIGHT FOOT LIMITED TO BREAKDOWN OF SKIN WITHOUT VARICOSE VEINS (HCC): ICD-10-CM

## 2025-01-28 DIAGNOSIS — M79.661 PAIN OF RIGHT LOWER LEG: Primary | ICD-10-CM

## 2025-01-28 DIAGNOSIS — I87.2 VENOUS ULCER OF RIGHT LOWER EXTREMITY WITHOUT VARICOSE VEINS (HCC): ICD-10-CM

## 2025-01-28 PROCEDURE — 87070 CULTURE OTHR SPECIMN AEROBIC: CPT

## 2025-01-28 PROCEDURE — 99213 OFFICE O/P EST LOW 20 MIN: CPT

## 2025-01-28 PROCEDURE — 87077 CULTURE AEROBIC IDENTIFY: CPT

## 2025-01-28 PROCEDURE — 87205 SMEAR GRAM STAIN: CPT

## 2025-01-28 PROCEDURE — 73630 X-RAY EXAM OF FOOT: CPT

## 2025-01-28 PROCEDURE — 87186 SC STD MICRODIL/AGAR DIL: CPT

## 2025-01-28 RX ORDER — CIPROFLOXACIN 500 MG/1
500 TABLET, FILM COATED ORAL 2 TIMES DAILY
Qty: 20 TABLET | Refills: 0 | Status: SHIPPED | OUTPATIENT
Start: 2025-01-28 | End: 2025-02-07

## 2025-01-28 RX ORDER — CLINDAMYCIN HYDROCHLORIDE 300 MG/1
300 CAPSULE ORAL 3 TIMES DAILY
Qty: 30 CAPSULE | Refills: 0 | Status: SHIPPED | OUTPATIENT
Start: 2025-01-28 | End: 2025-02-07

## 2025-01-28 RX ORDER — TRAMADOL HYDROCHLORIDE 50 MG/1
50 TABLET ORAL EVERY 6 HOURS PRN
Qty: 56 TABLET | Refills: 0 | Status: SHIPPED | OUTPATIENT
Start: 2025-01-28 | End: 2025-02-11

## 2025-01-28 RX ORDER — ALPRAZOLAM 0.25 MG/1
0.25 TABLET ORAL 3 TIMES DAILY PRN
COMMUNITY

## 2025-01-28 RX ORDER — ACETAMINOPHEN 500 MG
1 TABLET ORAL 2 TIMES DAILY
Qty: 60 CAPSULE | Refills: 0 | Status: SHIPPED | OUTPATIENT
Start: 2025-01-28 | End: 2025-02-27

## 2025-01-28 ASSESSMENT — PAIN DESCRIPTION - LOCATION: LOCATION: FOOT

## 2025-01-28 ASSESSMENT — PAIN SCALES - GENERAL: PAINLEVEL_OUTOF10: 9

## 2025-01-28 ASSESSMENT — PAIN DESCRIPTION - DESCRIPTORS: DESCRIPTORS: BURNING

## 2025-01-28 ASSESSMENT — PAIN DESCRIPTION - ORIENTATION: ORIENTATION: RIGHT

## 2025-01-28 ASSESSMENT — PAIN DESCRIPTION - PAIN TYPE: TYPE: CHRONIC PAIN

## 2025-01-28 NOTE — PLAN OF CARE
Problem: Wound:  Goal: Will show signs of wound healing; wound closure and no evidence of infection  Description: Will show signs of wound healing; wound closure and no evidence of infection  Outcome: Progressing   Patient presents to wound clinic for follow up of right lateral foot wound. New wounds to right medial/anterior foot, right medial ankle, and right medial lower leg. Discharge instructions/dressing orders per AVS. Follow up appointment scheduled in 2 weeks.     Care plan reviewed with patient.  Patient verbalize understanding of the plan of care and contribute to goal setting.

## 2025-01-28 NOTE — PATIENT INSTRUCTIONS
Visit Discharge/Physician Orders:  - Right foot xray ordered at last visit - please have this done as soon as you are able to.   - Prescription for Clindamycin and Cipro antibiotic and Tramadol for pain sent into pharmacy. Take as prescribed.   - Recommend eating yogurt daily and/or take over the counter probiotic while on antibiotics.   - We will order Santyl ointment from a mail order pharmacy.   - Wound culture taken today.   - Elevate legs periodically throughout the day to decrease swelling.   - Referral to vascular - they will contact you to schedule. This is for arterial and venous disease causing severe pain.    Wound Location: Right foot, Right inner lower leg, Right inner ankle, Right inner foot    Dressing orders:     1) Gather wound care supplies and arrange on clean table.     2) Wash your hands with soap and water or use alcohol based hand  for 20 seconds (sing \"Happy Birthday\" twice).    3) Cleanse wounds with normal saline or wound cleanser and gauze. Pat dry with clean gauze.    4) Right foot - Apply triad cream to skin around the wound to protect it. Apply dakins moistened gauze to wound, cover with and ABD pad. Wrap with roll gauze and ace bandage from base of toes to bend of knee. Change daily.      Right inner leg, Right inner ankle, Right inner foot - Apply triad cream to wounds. Cover with gauze and/or ABD pads.Wrap with roll gauze and ace bandage from base of toes to bend of knee. Change daily.      - Once you receive Santyl ointment then begin this dressing: Apply triad cream to skin around the wound to protect it. Apply Santyl ointment to the wound bed (nickel thick) and cover with saline moistened gauze, cover with and ABD pad. Wrap with roll gauze and ace bandage from base of toes to bend of knee. Change daily.     Keep all dressings clean & dry.    Follow up visit:  2 Weeks - Tuesday February 11th at 11:00 am    Supplies:  - Mechanical Debridement was completed today by using a

## 2025-01-28 NOTE — PROGRESS NOTES
Wound Care Supplies      Supply Company:     [employer] [address] [place] [address] f: [phone] f: [phone] p: [phone] [email or web address]      Ordering Center:   [facility]  [address] [place] [address]  [phone]  WOUND CARE Dept: [phone]   FAX NUMBER [phone]    Patient Information:      Lewis Ibarra  [address] [address]   [phone]   : 1955  AGE: 69 y.o.     GENDER: male   EPISODE DATE: 2025    Insurance:      PRIMARY INSURANCE:  Plan: [employer] [place]  Coverage: [place] BC[place]  Effective Date: 2025  Group Number: [unfilled]  Subscriber Number: [id number] - ([place] BCBS)    Payer/Plan Subscr  Sex Relation Sub. Ins. ID Effective Group Num   1. MEDICARE - ME* LEWIS IBARRA SOILA 1955 Male Self [id number] 21                                    [address]    2. [place] BCBS - ANT* VLADISLAV IBARRA 1960 Female Spouse [id number]                                   [address], [facility] [address]       Patient Wound Information:      Problem List Items Addressed This Visit          Circulatory    Venous (peripheral) insufficiency    Relevant Orders    External Referral To Vascular Surgery       Other    Pain in limb - Primary    Relevant Medications    traMADol (ULTRAM) 50 MG tablet    Other Relevant Orders    External Referral To Vascular Surgery     Other Visit Diagnoses       PVD (peripheral vascular disease) ([facility])        Relevant Orders    External Referral To Vascular Surgery            WOUNDS REQUIRING DRESSING SUPPLIES:     Wound 06/09/15 Other (Comment) Foot Right;Lateral #1 Right lateral foot  (Active)   Number of days: 3520       Wound 25 Foot Right;Lateral (Active)   Wound Image   25 1052   Wound Etiology Venous 25 1052   Dressing Status Intact;New dressing applied 25 1052   Wound Cleansed Cleansed with saline 25 1052 
  Drainage Description Serous;Yellow 01/28/25 1052   Odor None 01/28/25 1052   Doreen-wound Assessment Dry/flaky;Blanchable erythema 01/28/25 1052   Margins Attached edges 01/28/25 1052   Wound Thickness Description not for Pressure Injury Full thickness 01/28/25 1052   Number of days: 0       Wound 01/28/25 Ankle Right;Medial (Active)   Wound Image   01/28/25 1052   Wound Etiology Venous 01/28/25 1052   Dressing Status Intact;New dressing applied 01/28/25 1052   Wound Cleansed Cleansed with saline 01/28/25 1052   Dressing/Treatment Triad hydro/zinc oxide-based hydrophilic paste;Gauze dressing/dressing sponge;Roll gauze;Ace wrap 01/28/25 1052   Offloading for Diabetic Foot Ulcers Offloading not required 01/28/25 1052   Wound Length (cm) 2.5 cm 01/28/25 1052   Wound Width (cm) 3 cm 01/28/25 1052   Wound Depth (cm) 0.2 cm 01/28/25 1052   Wound Surface Area (cm^2) 7.5 cm^2 01/28/25 1052   Wound Volume (cm^3) 1.5 cm^3 01/28/25 1052   Wound Assessment Slough;Epithelialization 01/28/25 1052   Drainage Amount Moderate (25-50%) 01/28/25 1052   Drainage Description Serous;Yellow 01/28/25 1052   Odor None 01/28/25 1052   Doreen-wound Assessment Maceration;Blanchable erythema;Dry/flaky 01/28/25 1052   Margins Attached edges 01/28/25 1052   Wound Thickness Description not for Pressure Injury Full thickness 01/28/25 1052   Number of days: 0       Wound 01/28/25 Foot Right;Anterior;Medial (Active)   Wound Image   01/28/25 1052   Wound Etiology Venous 01/28/25 1052   Dressing Status Intact;New dressing applied 01/28/25 1052   Wound Cleansed Cleansed with saline 01/28/25 1052   Dressing/Treatment Triad hydro/zinc oxide-based hydrophilic paste;Gauze dressing/dressing sponge;Roll gauze;Ace wrap 01/28/25 1052   Offloading for Diabetic Foot Ulcers Offloading not required 01/28/25 1052   Wound Length (cm) 1.6 cm 01/28/25 1052   Wound Width (cm) 0.9 cm 01/28/25 1052   Wound Depth (cm) 0.05 cm 01/28/25 1052   Wound Surface Area (cm^2) 1.44

## 2025-01-29 PROBLEM — L97.919 VENOUS ULCER OF RIGHT LOWER EXTREMITY WITHOUT VARICOSE VEINS (HCC): Status: ACTIVE | Noted: 2025-01-29

## 2025-01-29 PROBLEM — I87.2 VENOUS ULCER OF RIGHT LOWER EXTREMITY WITHOUT VARICOSE VEINS (HCC): Status: ACTIVE | Noted: 2025-01-29

## 2025-01-30 ENCOUNTER — TELEPHONE (OUTPATIENT)
Dept: WOUND CARE | Age: 70
End: 2025-01-30

## 2025-01-30 NOTE — TELEPHONE ENCOUNTER
Wound culture results reviewed with Dr LUNA Gardner. See perfect serve below.    Umm Gardner   Patient:Lewis Samayoa  25 10:33 AM   [phone] From: Shellie BROWNING [facility] RE: Lewis Samayoa  1955. Final wound culture results available to review. Patient was started on Cipro and Cleocin on  at wound care visit. Should he continue both of these? Thank you!  Read 10:34 AM   25 10:35 AM  In surgery, will take a look when finished. Thanks  25 12:24 PM  Hello. Following up. Looks like clindamycin and ciprofloxacin cover his cultures so he should continue with both. Thanks!

## 2025-02-06 DIAGNOSIS — F41.8 SITUATIONAL ANXIETY: ICD-10-CM

## 2025-02-06 RX ORDER — ALPRAZOLAM 0.25 MG/1
0.25 TABLET ORAL 3 TIMES DAILY PRN
Qty: 90 TABLET | Refills: 0 | Status: SHIPPED | OUTPATIENT
Start: 2025-02-06 | End: 2025-03-08

## 2025-02-11 ENCOUNTER — HOSPITAL ENCOUNTER (OUTPATIENT)
Dept: WOUND CARE | Age: 70
Discharge: HOME | End: 2025-02-11
Attending: PODIATRIST
Payer: COMMERCIAL

## 2025-02-11 VITALS
TEMPERATURE: 96.7 F | HEART RATE: 75 BPM | OXYGEN SATURATION: 95 % | SYSTOLIC BLOOD PRESSURE: 131 MMHG | DIASTOLIC BLOOD PRESSURE: 65 MMHG | RESPIRATION RATE: 18 BRPM

## 2025-02-11 PROCEDURE — 99213 OFFICE O/P EST LOW 20 MIN: CPT

## 2025-02-11 RX ORDER — CLINDAMYCIN HYDROCHLORIDE 300 MG/1
300 CAPSULE ORAL 3 TIMES DAILY
Qty: 30 CAPSULE | Refills: 0 | Status: SHIPPED | OUTPATIENT
Start: 2025-02-11 | End: 2025-02-21

## 2025-02-11 ASSESSMENT — PAIN DESCRIPTION - PAIN TYPE: TYPE: CHRONIC PAIN

## 2025-02-11 ASSESSMENT — PAIN SCALES - GENERAL: PAINLEVEL_OUTOF10: 5

## 2025-02-11 ASSESSMENT — PAIN DESCRIPTION - DESCRIPTORS: DESCRIPTORS: ACHING

## 2025-02-11 ASSESSMENT — PAIN DESCRIPTION - LOCATION: LOCATION: FOOT

## 2025-02-11 NOTE — PROGRESS NOTES
Wound Care Supplies      Supply Company:     [employer] [address] [place] [address] f: [phone] f: [phone] p: [phone] [email or web address]      Ordering Center:   [facility]  [address] [address]  [phone]  WOUND CARE Dept: [phone]   FAX NUMBER [phone]    Patient Information:      Lewis Ibarra  [address] [place] [address]   [phone]   : 1955  AGE: 69 y.o.     GENDER: male   EPISODE DATE: 2025    Insurance:      PRIMARY INSURANCE:  Plan: [employer] [place]  Coverage[place]  Effective Date: 2025  Group Number: [unfilled]  Subscriber Number: [id number] - ([place] BCBS)    Payer/Plan Subscr  Sex Relation Sub. Ins. ID Effective Group Num   1. MEDICARE - ME* LEWIS IBARRA SOILA 1955 Male Self [id number] 21                                    [address]    2. [place] BCBS - ANT* FREDTAMIE PARRISHIA 1960 Female Spouse [id number]                                   [address] [address]       Patient Wound Information:      Problem List Items Addressed This Visit    None      WOUNDS REQUIRING DRESSING SUPPLIES:     Wound 06/09/15 Other (Comment) Foot Right;Lateral #1 Right lateral foot  (Active)   Number of days: 3534       Wound 25 Foot Right;Lateral (Active)   Wound Image   25 1142   Wound Etiology Venous 25 1142   Dressing Status Old drainage noted;New dressing applied 25 1142   Wound Cleansed Cleansed with saline 25 1142   Dressing/Treatment Triad hydro/zinc oxide-based hydrophilic paste;Gauze dressing/dressing sponge;Moisten with saline;ABD;Roll gauze;Ace wrap 25 1142   Offloading for Diabetic Foot Ulcers Offloading not required 25 1142   Wound Length (cm) 6.3 cm 25 1142   Wound Width (cm) 3 cm 25 1142   Wound Depth (cm) 0.7 cm 25 1142   Wound Surface Area (cm^2) 18.9 cm^2 25 1142   Change in 
odor   *Uncontrolled swelling   *Need for compression bandage changes due to slippage, breakthrough drainage    If you need medical attention outside of business hours, please contact your Primary Care Doctor or go to the nearest emergency room.                Electronically signed by Maetus Johnson DPM, FACHOA on 2/11/2025 at 6:53 PM

## 2025-02-11 NOTE — PLAN OF CARE
Problem: Pain  Goal: Verbalizes/displays adequate comfort level or baseline comfort level  Outcome: Progressing     Problem: Cognitive:  Goal: Knowledge of wound care  Description: Knowledge of wound care  Outcome: Progressing  Goal: Understands risk factors for wounds  Description: Understands risk factors for wounds  Outcome: Progressing     Problem: Wound:  Goal: Will show signs of wound healing; wound closure and no evidence of infection  Description: Will show signs of wound healing; wound closure and no evidence of infection  Outcome: Progressing     Problem: Venous:  Goal: Signs of wound healing will improve  Description: Signs of wound healing will improve  Outcome: Progressing     Problem: Smoking cessation:  Goal: Ability to formulate a plan to maintain a tobacco-free life will be supported  Description: Ability to formulate a plan to maintain a tobacco-free life will be supported  Outcome: Progressing     Problem: Compression therapy:  Goal: Will be free from complications associated with compression therapy  Description: Will be free from complications associated with compression therapy  Outcome: Progressing     Problem: Weight control:  Goal: Ability to maintain an optimal weight for height and age will be supported  Description: Ability to maintain an optimal weight for height and age will be supported  Outcome: Progressing     Problem: Falls - Risk of:  Goal: Will remain free from falls  Description: Will remain free from falls  Outcome: Progressing     Problem: Blood Glucose:  Goal: Ability to maintain appropriate glucose levels will improve  Description: Ability to maintain appropriate glucose levels will improve  Outcome: Progressing     Patient seen in wound clinic today.  See AVS for discharge instructions.  Care plan reviewed with patient.  Patient verbalizes understanding of the plan of care and contribute to goal setting.

## 2025-02-11 NOTE — PATIENT INSTRUCTIONS
Visit Discharge/Physician Orders:  - Mechanical debridement completed today with saline and gauze.  - Recommend eating yogurt daily and/or take over the counter probiotic while on antibiotics.   - Elevate legs periodically throughout the day to decrease swelling.   - Referral to vascular - they will contact you to schedule. This is for arterial and venous disease causing severe pain.    Wound Location: Right foot, Right inner lower leg, Right inner ankle, Right inner foot    Dressing orders:     1) Gather wound care supplies and arrange on clean table.     2) Wash your hands with soap and water or use alcohol based hand  for 20 seconds (sing \"Happy Birthday\" twice).    3) Cleanse wounds with normal saline or wound cleanser and gauze. Pat dry with clean gauze.    4) Right foot - Apply triad cream to skin around the wound to protect it. Apply dakins moistened vaseline gauze to wound, cover with and ABD pad. Wrap with roll gauze and ace bandage from base of toes to bend of knee. Change daily.      Right inner leg, Right inner ankle, Right inner foot - Apply triad cream to wounds. Cover with gauze and/or ABD pads.Wrap with roll gauze and ace bandage from base of toes to bend of knee. Change daily.      - Once you receive Santyl ointment then begin this dressing: Apply triad cream to skin around the wound to protect it. Apply Santyl ointment to the wound bed (nickel thick) and cover with saline moistened gauze, cover with and ABD pad. Wrap with roll gauze and ace bandage from base of toes to bend of knee. Change daily.     Keep all dressings clean & dry.    Follow up visit:  2 Weeks - Tuesday February 25, 2025 at 10 am    Supplies:  - Mechanical Debridement was completed today by using a saline and gauze.     Duration of dressings: 30 days    We have sent your supply order to the following company: [employer] Phone # [phone]   If you don't receive the items you were expecting or don't know what the items are

## 2025-02-25 ENCOUNTER — HOSPITAL ENCOUNTER (OUTPATIENT)
Dept: WOUND CARE | Age: 70
Discharge: HOME | End: 2025-02-25
Attending: PODIATRIST
Payer: COMMERCIAL

## 2025-02-25 VITALS
DIASTOLIC BLOOD PRESSURE: 86 MMHG | HEART RATE: 92 BPM | RESPIRATION RATE: 18 BRPM | OXYGEN SATURATION: 95 % | SYSTOLIC BLOOD PRESSURE: 140 MMHG | TEMPERATURE: 97.5 F

## 2025-02-25 PROCEDURE — 11042 DBRDMT SUBQ TIS 1ST 20SQCM/<: CPT

## 2025-02-25 RX ORDER — BACITRACIN ZINC AND POLYMYXIN B SULFATE 500; 1000 [USP'U]/G; [USP'U]/G
OINTMENT TOPICAL PRN
OUTPATIENT
Start: 2025-02-25

## 2025-02-25 RX ORDER — LIDOCAINE 40 MG/G
CREAM TOPICAL PRN
OUTPATIENT
Start: 2025-02-25

## 2025-02-25 RX ORDER — CLOBETASOL PROPIONATE 0.5 MG/G
OINTMENT TOPICAL PRN
OUTPATIENT
Start: 2025-02-25

## 2025-02-25 RX ORDER — SODIUM CHLOR/HYPOCHLOROUS ACID 0.033 %
SOLUTION, IRRIGATION IRRIGATION PRN
OUTPATIENT
Start: 2025-02-25

## 2025-02-25 RX ORDER — NEOMYCIN/BACITRACIN/POLYMYXINB 3.5-400-5K
OINTMENT (GRAM) TOPICAL PRN
OUTPATIENT
Start: 2025-02-25

## 2025-02-25 RX ORDER — GENTAMICIN SULFATE 1 MG/G
OINTMENT TOPICAL PRN
OUTPATIENT
Start: 2025-02-25

## 2025-02-25 RX ORDER — LIDOCAINE HYDROCHLORIDE 20 MG/ML
JELLY TOPICAL PRN
OUTPATIENT
Start: 2025-02-25

## 2025-02-25 RX ORDER — SILVER SULFADIAZINE 10 MG/G
CREAM TOPICAL PRN
OUTPATIENT
Start: 2025-02-25

## 2025-02-25 RX ORDER — BETAMETHASONE DIPROPIONATE 0.5 MG/G
CREAM TOPICAL PRN
OUTPATIENT
Start: 2025-02-25

## 2025-02-25 RX ORDER — TRIAMCINOLONE ACETONIDE 1 MG/G
OINTMENT TOPICAL PRN
OUTPATIENT
Start: 2025-02-25

## 2025-02-25 RX ORDER — LIDOCAINE HYDROCHLORIDE 40 MG/ML
SOLUTION TOPICAL PRN
OUTPATIENT
Start: 2025-02-25

## 2025-02-25 RX ORDER — MUPIROCIN 20 MG/G
OINTMENT TOPICAL PRN
OUTPATIENT
Start: 2025-02-25

## 2025-02-25 RX ORDER — GINSENG 100 MG
CAPSULE ORAL PRN
OUTPATIENT
Start: 2025-02-25

## 2025-02-25 RX ORDER — LIDOCAINE 50 MG/G
OINTMENT TOPICAL PRN
OUTPATIENT
Start: 2025-02-25

## 2025-02-25 ASSESSMENT — PAIN DESCRIPTION - LOCATION: LOCATION: FOOT

## 2025-02-25 ASSESSMENT — PAIN DESCRIPTION - ORIENTATION: ORIENTATION: RIGHT

## 2025-02-25 ASSESSMENT — PAIN DESCRIPTION - DESCRIPTORS: DESCRIPTORS: ACHING

## 2025-02-25 NOTE — PATIENT INSTRUCTIONS
Visit Discharge/Physician Orders:  - Debridement of right lateral foot today  - Elevate legs periodically throughout the day to decrease swelling.   - Referral to vascular - they will contact you to schedule. This is for arterial and venous disease causing severe pain.    Wound Location: Right foot, Right inner lower leg, Right inner ankle    Dressing orders:     1) Gather wound care supplies and arrange on clean table.     2) Wash your hands with soap and water or use alcohol based hand  for 20 seconds (sing \"Happy Birthday\" twice).    3) Cleanse wounds with normal saline or wound cleanser and gauze. Pat dry with clean gauze.    4) Right foot - Apply triad cream to skin around the wound to protect it. Clean wound with dakins apply manuka honey, cover with an ABD pad. Wrap with roll gauze and ace bandage from base of toes to bend of knee. Change daily.    Right inner leg,  Right inner ankle- apply triad to genet wound, apply manuka honey, Cover with gauze and/or ABD pads.Wrap with roll gauze and ace bandage from base of toes to bend of knee. Change daily.      Keep all dressings clean & dry.    Follow up visit:  2 Weeks - Tuesday March 11 at 10am    Supplies:  - Mechanical Debridement was completed today by using a saline and gauze.     Duration of dressings: 30 days    We have sent your supply order to the following company: [employer] Phone # [phone]   If you don't receive the items you were expecting or don't know what the items are that you received, call the company where the order was sent.   If you are unable to obtain wound supplies, continue to use the supplies you have available until you are able to reach us. It is most important to keep the wound covered at all times.  It is YOUR responsibility to make sure that supplies are re-ordered before you run out. Re-order telephone numbers are included in each package.     Keep next scheduled appointment. Please give 24 hour notice if unable to keep

## 2025-02-25 NOTE — PLAN OF CARE
Problem: Wound:  Goal: Will show signs of wound healing; wound closure and no evidence of infection  Description: Will show signs of wound healing; wound closure and no evidence of infection  Note: Patient seen for right foot, ankle, and leg wound. Wound shows signs of proper closure and healing. No s/s of infection noted. Follow up in 2 weeks. See AVS for order changes.     Care plan reviewed with patient.  Patient verbalize understanding of the plan of care and contribute to goal setting.

## 2025-02-25 NOTE — PROGRESS NOTES
[facility]         Progress and Procedure Note      Lewis Samayoa  MEDICAL RECORD NUMBER:  [id number]  AGE: 69 y.o.   GENDER: male  : 1955  EPISODE DATE:  2025    Subjective:     Chief Complaint   Patient presents with    Wound Check   Foot and leg left     HISTORY of PRESENT ILLNESS HPI     Lewis Samayoa is a 69 y.o. male who presents today for follow-up evaluation he has been using Dakin's and has been on antibiotics and his much improved the patient is also using Medihoney on his own and he presents very happy with his results of his 2 wounds on the lateral distal leg and the one on the dorsal left foot.  Patient using Cipro  Wound/Ulcer Pain Timing/Severity: intermittent, mild, moderate  Quality of pain: sharp, shooting, tender  Severity:  6 / 10   Modifying Factors: Pain worsens with pressure and touching  Associated Signs/Symptoms: edema, drainage, and pain        PAST MEDICAL HISTORY        Diagnosis Date    Anxiety     Blood circulation, collateral     Gout     Hypertension     Hypotestosteronism 2013    Osteoarthritis     PONV (postoperative nausea and vomiting)        PAST SURGICAL HISTORY    Past Surgical History:   Procedure Laterality Date    APPENDECTOMY      GASTROCNEMIUS RECESSION Right 2019    RIGHT 1ST MET-TARSAL FUSION, GASTROC RECESSION, N-C FUSION, T-N FUSION, STJ FUSION, BMA performed by Mateus Johnson DPM at [facility] OR    HERNIA REPAIR      ABDOMINAL    OTHER SURGICAL HISTORY      excision of scrotal wall lesion       FAMILY HISTORY    Family History   Problem Relation Age of Onset    Diabetes Maternal Grandmother     Heart Disease Maternal Grandmother         PACEMAKER    Cancer Maternal Grandmother     Breast Cancer Maternal Grandmother     Cancer Mother         liver    High Blood Pressure Neg Hx     Stroke Neg Hx        SOCIAL HISTORY    Social History     Tobacco Use    Smoking status: Former     Current packs/day: 0.00     Average

## 2025-03-11 ENCOUNTER — HOSPITAL ENCOUNTER (OUTPATIENT)
Dept: WOUND CARE | Age: 70
Discharge: HOME | End: 2025-03-11
Attending: PODIATRIST
Payer: COMMERCIAL

## 2025-03-11 VITALS
OXYGEN SATURATION: 97 % | TEMPERATURE: 97.1 F | SYSTOLIC BLOOD PRESSURE: 175 MMHG | RESPIRATION RATE: 18 BRPM | HEART RATE: 86 BPM | DIASTOLIC BLOOD PRESSURE: 84 MMHG

## 2025-03-11 DIAGNOSIS — I87.2 VENOUS ULCER OF RIGHT LOWER EXTREMITY WITHOUT VARICOSE VEINS (HCC): Primary | ICD-10-CM

## 2025-03-11 DIAGNOSIS — L97.919 VENOUS ULCER OF RIGHT LOWER EXTREMITY WITHOUT VARICOSE VEINS (HCC): Primary | ICD-10-CM

## 2025-03-11 PROCEDURE — 97597 DBRDMT OPN WND 1ST 20 CM/<: CPT

## 2025-03-11 RX ORDER — CLOBETASOL PROPIONATE 0.5 MG/G
OINTMENT TOPICAL PRN
OUTPATIENT
Start: 2025-03-11

## 2025-03-11 RX ORDER — GINSENG 100 MG
CAPSULE ORAL PRN
OUTPATIENT
Start: 2025-03-11

## 2025-03-11 RX ORDER — LIDOCAINE HYDROCHLORIDE 40 MG/ML
SOLUTION TOPICAL PRN
OUTPATIENT
Start: 2025-03-11

## 2025-03-11 RX ORDER — BACITRACIN ZINC AND POLYMYXIN B SULFATE 500; 1000 [USP'U]/G; [USP'U]/G
OINTMENT TOPICAL PRN
OUTPATIENT
Start: 2025-03-11

## 2025-03-11 RX ORDER — MUPIROCIN 20 MG/G
OINTMENT TOPICAL PRN
OUTPATIENT
Start: 2025-03-11

## 2025-03-11 RX ORDER — LIDOCAINE 50 MG/G
OINTMENT TOPICAL PRN
OUTPATIENT
Start: 2025-03-11

## 2025-03-11 RX ORDER — LIDOCAINE 40 MG/G
CREAM TOPICAL PRN
OUTPATIENT
Start: 2025-03-11

## 2025-03-11 RX ORDER — SODIUM CHLOR/HYPOCHLOROUS ACID 0.033 %
SOLUTION, IRRIGATION IRRIGATION PRN
OUTPATIENT
Start: 2025-03-11

## 2025-03-11 RX ORDER — NEOMYCIN/BACITRACIN/POLYMYXINB 3.5-400-5K
OINTMENT (GRAM) TOPICAL PRN
OUTPATIENT
Start: 2025-03-11

## 2025-03-11 RX ORDER — BETAMETHASONE DIPROPIONATE 0.5 MG/G
CREAM TOPICAL PRN
OUTPATIENT
Start: 2025-03-11

## 2025-03-11 RX ORDER — GENTAMICIN SULFATE 1 MG/G
OINTMENT TOPICAL PRN
OUTPATIENT
Start: 2025-03-11

## 2025-03-11 RX ORDER — LIDOCAINE HYDROCHLORIDE 20 MG/ML
JELLY TOPICAL PRN
OUTPATIENT
Start: 2025-03-11

## 2025-03-11 RX ORDER — SILVER SULFADIAZINE 10 MG/G
CREAM TOPICAL PRN
OUTPATIENT
Start: 2025-03-11

## 2025-03-11 RX ORDER — TRIAMCINOLONE ACETONIDE 1 MG/G
OINTMENT TOPICAL PRN
OUTPATIENT
Start: 2025-03-11

## 2025-03-11 NOTE — PROGRESS NOTES
[facility]         Progress and Procedure Note      Lewis Samayoa  MEDICAL RECORD NUMBER:  [id number]  AGE: 69 y.o.   GENDER: male  : 1955  EPISODE DATE:  3/11/2025    Subjective:     Chief Complaint   Patient presents with    Wound Check   Foot and leg left     HISTORY of PRESENT ILLNESS HPI     Lewis Samayoa is a 69 y.o. male who presents today for follow-up evaluation he has been using Dakin's and patient has finished his oral antibiotic and now he is using Dakin's and an Adaptic strictly on the wounds he is present presenting for follow-up.    Wound/Ulcer Pain Timing/Severity: intermittent, mild, moderate  Quality of pain: sharp, shooting, tender  Severity:  6 / 10   Modifying Factors: Pain worsens with pressure and touching  Associated Signs/Symptoms: edema, drainage, and pain        PAST MEDICAL HISTORY        Diagnosis Date    Anxiety     Blood circulation, collateral     Gout     Hypertension     Hypotestosteronism 2013    Osteoarthritis     PONV (postoperative nausea and vomiting)        PAST SURGICAL HISTORY    Past Surgical History:   Procedure Laterality Date    APPENDECTOMY      GASTROCNEMIUS RECESSION Right 2019    RIGHT 1ST MET-TARSAL FUSION, GASTROC RECESSION, N-C FUSION, T-N FUSION, STJ FUSION, BMA performed by Mateus Johnson DPM at [facility] OR    HERNIA REPAIR      ABDOMINAL    OTHER SURGICAL HISTORY      excision of scrotal wall lesion       FAMILY HISTORY    Family History   Problem Relation Age of Onset    Diabetes Maternal Grandmother     Heart Disease Maternal Grandmother         PACEMAKER    Cancer Maternal Grandmother     Breast Cancer Maternal Grandmother     Cancer Mother         liver    High Blood Pressure Neg Hx     Stroke Neg Hx        SOCIAL HISTORY    Social History     Tobacco Use    Smoking status: Former     Current packs/day: 0.00     Average packs/day: 0.3 packs/day for 7.0 years (1.8 ttl pk-yrs)     Types: Cigarettes     Start 
Cleansed with saline 03/11/25 1010   Dressing/Treatment ABD;Ace wrap;Collagen;Roll gauze 03/11/25 1010   Offloading for Diabetic Foot Ulcers Offloading not required;Offloading not ordered 03/11/25 1010   Wound Length (cm) 1.2 cm 03/11/25 1010   Wound Width (cm) 2.4 cm 03/11/25 1010   Wound Depth (cm) 0.4 cm 03/11/25 1010   Wound Surface Area (cm^2) 2.88 cm^2 03/11/25 1010   Change in Wound Size % (l*w) 61.6 03/11/25 1010   Wound Volume (cm^3) 1.152 cm^3 03/11/25 1010   Wound Healing % 23 03/11/25 1010   Wound Assessment Pale granulation tissue;Granulation tissue;Slough;Fibrinous 03/11/25 1010   Drainage Amount Moderate (25-50%) 03/11/25 1010   Drainage Description Serosanguinous 03/11/25 1010   Odor None 03/11/25 1010   Doreen-wound Assessment Blanchable erythema;Dry/flaky;Induration 03/11/25 1010   Margins Attached edges 03/11/25 1010   Wound Thickness Description not for Pressure Injury Full thickness 03/11/25 1010   Number of days: 42     Incision 12/27/19 Foot Anterior;Right (Active)   Number of days: 1901       Supplies Requested :        *DISPENSE AS WRITTEN*    WOUND #: 1   PRIMARY DRESSING:  Collagen    Cover and Secure with: ABD pad  Bulky roll gauze, 4 inch double long velcro ace wrap     FREQUENCY OF DRESSING CHANGES:  Daily     *DISPENSE AS WRITTEN*    WOUND #: 2   PRIMARY DRESSING:  Collagen    Cover and Secure with: ABD pad  Bulky roll gauze     FREQUENCY OF DRESSING CHANGES:  Daily       *DISPENSE AS WRITTEN*    WOUND #: 3   PRIMARY DRESSING:  Collagen    Cover and Secure with: ABD pad  Bulky roll gauze     FREQUENCY OF DRESSING CHANGES:  Daily     ADDITIONAL ITEMS:  [] Gloves Small  [x] Gloves Medium [] Gloves Large [] Gloves XLarge  [] Tape 1\" [x] Tape 2\" [] Tape 3\"  [] Medipore Tape  [x] Saline  [] Skin Prep   [] Adhesive Remover   [] Cotton Tip Applicators   [] Other:    Patient Wound(s) Debrided: [x] Yes if yes please add date 3/11/25   [] No    Debribement Type: Excisional/Sharp    Patient

## 2025-03-11 NOTE — PLAN OF CARE
Problem: Wound:  Goal: Will show signs of wound healing; wound closure and no evidence of infection  Description: Will show signs of wound healing; wound closure and no evidence of infection  Outcome: Progressing   Patient seen in clinic today for right leg/foot wounds. No s/s of infection. See AVS. Follow up in 3 week/s. Care plan reviewed with patient. Patient verbalizes understanding of the plan of care and contributes to goal setting.

## 2025-03-11 NOTE — PATIENT INSTRUCTIONS
Visit Discharge/Physician Orders:  - Debridement of right lateral foot and right today  - Elevate legs periodically throughout the day to decrease swelling.   - Referral to vascular - they will contact you to schedule. This is for arterial and venous disease causing severe pain.    Wound Location: Right foot, Right inner lower leg, Right inner ankle    Dressing orders:     1) Gather wound care supplies and arrange on clean table.     2) Wash your hands with soap and water or use alcohol based hand  for 20 seconds (sing \"Happy Birthday\" twice).    3) Cleanse wounds with normal saline or wound cleanser and gauze. Pat dry with clean gauze.    4) Right foot - Apply triad cream to skin around the wound to protect it. Clean wound with dakins apply collagen(moisten with a few drops of saline), cover with an ABD pad. Wrap with roll gauze and ace bandage from base of toes to bend of knee. Change daily.     Right inner leg-  Right inner ankle- apply triad to genet wound, apply collagen(moisten with a few drops of saline, Cover with gauze and/or ABD pads.Wrap with roll gauze and ace bandage from base of toes to bend of knee. Change daily.      Keep all dressings clean & dry.    Follow up visit:  3 Weeks - Tuesday April 1st at 1:15pm    Supplies:  - Mechanical Debridement was completed today by using a saline and gauze.     Duration of dressings: 30 days    We have sent your supply order to the following company: [employer] Phone # [phone]   If you don't receive the items you were expecting or don't know what the items are that you received, call the company where the order was sent.   If you are unable to obtain wound supplies, continue to use the supplies you have available until you are able to reach us. It is most important to keep the wound covered at all times.  It is YOUR responsibility to make sure that supplies are re-ordered before you run out. Re-order telephone numbers are included in each package.

## 2025-04-01 ENCOUNTER — HOSPITAL ENCOUNTER (OUTPATIENT)
Dept: WOUND CARE | Age: 70
Discharge: HOME | End: 2025-04-01
Attending: PODIATRIST
Payer: COMMERCIAL

## 2025-04-01 VITALS
HEART RATE: 93 BPM | TEMPERATURE: 97.3 F | RESPIRATION RATE: 18 BRPM | SYSTOLIC BLOOD PRESSURE: 143 MMHG | OXYGEN SATURATION: 95 % | DIASTOLIC BLOOD PRESSURE: 82 MMHG

## 2025-04-01 DIAGNOSIS — L97.919 VENOUS ULCER OF RIGHT LOWER EXTREMITY WITHOUT VARICOSE VEINS: Primary | ICD-10-CM

## 2025-04-01 DIAGNOSIS — I87.2 VENOUS ULCER OF RIGHT LOWER EXTREMITY WITHOUT VARICOSE VEINS: Primary | ICD-10-CM

## 2025-04-01 PROCEDURE — 11042 DBRDMT SUBQ TIS 1ST 20SQCM/<: CPT

## 2025-04-01 PROCEDURE — 97597 DBRDMT OPN WND 1ST 20 CM/<: CPT

## 2025-04-01 RX ORDER — SILVER SULFADIAZINE 10 MG/G
CREAM TOPICAL PRN
OUTPATIENT
Start: 2025-04-01

## 2025-04-01 RX ORDER — LIDOCAINE HYDROCHLORIDE 40 MG/ML
SOLUTION TOPICAL PRN
OUTPATIENT
Start: 2025-04-01

## 2025-04-01 RX ORDER — TRIAMCINOLONE ACETONIDE 1 MG/G
OINTMENT TOPICAL PRN
OUTPATIENT
Start: 2025-04-01

## 2025-04-01 RX ORDER — NEOMYCIN/BACITRACIN/POLYMYXINB 3.5-400-5K
OINTMENT (GRAM) TOPICAL PRN
OUTPATIENT
Start: 2025-04-01

## 2025-04-01 RX ORDER — LIDOCAINE 40 MG/G
CREAM TOPICAL PRN
OUTPATIENT
Start: 2025-04-01

## 2025-04-01 RX ORDER — GINSENG 100 MG
CAPSULE ORAL PRN
OUTPATIENT
Start: 2025-04-01

## 2025-04-01 RX ORDER — MUPIROCIN 20 MG/G
OINTMENT TOPICAL PRN
OUTPATIENT
Start: 2025-04-01

## 2025-04-01 RX ORDER — LIDOCAINE 50 MG/G
OINTMENT TOPICAL PRN
OUTPATIENT
Start: 2025-04-01

## 2025-04-01 RX ORDER — SODIUM CHLOR/HYPOCHLOROUS ACID 0.033 %
SOLUTION, IRRIGATION IRRIGATION PRN
OUTPATIENT
Start: 2025-04-01

## 2025-04-01 RX ORDER — CLOBETASOL PROPIONATE 0.5 MG/G
OINTMENT TOPICAL PRN
OUTPATIENT
Start: 2025-04-01

## 2025-04-01 RX ORDER — LIDOCAINE HYDROCHLORIDE 20 MG/ML
JELLY TOPICAL PRN
OUTPATIENT
Start: 2025-04-01

## 2025-04-01 RX ORDER — BACITRACIN ZINC AND POLYMYXIN B SULFATE 500; 1000 [USP'U]/G; [USP'U]/G
OINTMENT TOPICAL PRN
OUTPATIENT
Start: 2025-04-01

## 2025-04-01 RX ORDER — GENTAMICIN SULFATE 1 MG/G
OINTMENT TOPICAL PRN
OUTPATIENT
Start: 2025-04-01

## 2025-04-01 RX ORDER — BETAMETHASONE DIPROPIONATE 0.5 MG/G
CREAM TOPICAL PRN
OUTPATIENT
Start: 2025-04-01

## 2025-04-01 NOTE — PLAN OF CARE
Problem: Wound:  Goal: Will show signs of wound healing; wound closure and no evidence of infection  Description: Will show signs of wound healing; wound closure and no evidence of infection  Outcome: Progressing     Patient presents to wound clinic for follow up of right leg and foot wounds. See AVS for discharge instructions. Follow up in 5 weeks.    Care plan reviewed with patient.  Patient verbalize understanding of the plan of care and contribute to goal setting.

## 2025-04-01 NOTE — PROGRESS NOTES
Teaching Note:    I was present with the resident during the visit.  I discussed the case with the resident and agree with the findings and the plan as documented in the residents note.    Mateus Johnson DPM, FACFAS  Podiatric Medicine & Surgery       [facility]

## 2025-04-01 NOTE — PROGRESS NOTES
[facility]         Progress and Procedure Note      Lewis Samayoa  MEDICAL RECORD NUMBER:  [id number]  AGE: 69 y.o.   GENDER: male  : 1955  EPISODE DATE:  2025    Subjective:     Chief Complaint   Patient presents with    Wound Check     Right lower extremity   Foot and leg left     HISTORY of PRESENT ILLNESS HPI     Lewis Samayoa is a 69 y.o. male who presents today for follow-up evaluation he has been using Dakin's to the wound bed with Triad cream to the periphery and an Adaptic strictly on the wounds he is present presenting for follow-up.    Wound/Ulcer Pain Timing/Severity: intermittent, mild, moderate  Quality of pain: sharp, shooting, tender  Severity:  6 / 10   Modifying Factors: Pain worsens with pressure and touching  Associated Signs/Symptoms: edema, drainage, and pain        PAST MEDICAL HISTORY        Diagnosis Date    Anxiety     Blood circulation, collateral     Gout     Hypertension     Hypotestosteronism 2013    Osteoarthritis     PONV (postoperative nausea and vomiting)        PAST SURGICAL HISTORY    Past Surgical History:   Procedure Laterality Date    APPENDECTOMY      GASTROCNEMIUS RECESSION Right 2019    RIGHT 1ST MET-TARSAL FUSION, GASTROC RECESSION, N-C FUSION, T-N FUSION, STJ FUSION, BMA performed by Mateus Johnson DPM at [facility] OR    HERNIA REPAIR      ABDOMINAL    OTHER SURGICAL HISTORY      excision of scrotal wall lesion       FAMILY HISTORY    Family History   Problem Relation Age of Onset    Diabetes Maternal Grandmother     Heart Disease Maternal Grandmother         PACEMAKER    Cancer Maternal Grandmother     Breast Cancer Maternal Grandmother     Cancer Mother         liver    High Blood Pressure Neg Hx     Stroke Neg Hx        SOCIAL HISTORY    Social History     Tobacco Use    Smoking status: Former     Current packs/day: 0.00     Average packs/day: 0.3 packs/day for 7.0 years (1.8 ttl pk-yrs)     Types: Cigarettes

## 2025-04-01 NOTE — PATIENT INSTRUCTIONS
Visit Discharge/Physician Orders:  - Debridement of right lateral foot and right medial leg today  - Elevate legs periodically throughout the day to decrease swelling.   - Dakins script sent to pharmacy    Wound Location: Right foot, Right inner lower leg, Right inner ankle    Dressing orders:     1) Gather wound care supplies and arrange on clean table.     2) Wash your hands with soap and water or use alcohol based hand  for 20 seconds (sing \"Happy Birthday\" twice).    3) Cleanse wounds with dakins and gauze. Pat dry with clean gauze.    4) Right foot - Apply triad cream to wound. Cover with ABD pad. Wrap with roll gauze and ace bandage from base of toes to bend of knee. Change daily.     Right inner leg-  Right inner ankle- Apply triad to wound. Cover with ABD pads. Wrap with roll gauze and ace bandage from base of toes to bend of knee. Change daily.      Keep all dressings clean & dry.    Follow up visit:  5 Weeks - Tuesday May 6th at 1:15pm    Supplies:  - Mechanical Debridement was completed today by using saline and gauze.     Duration of dressings: 30 days    We have sent your supply order to the following company: [employer] Phone # [phone]   If you don't receive the items you were expecting or don't know what the items are that you received, call the company where the order was sent.   If you are unable to obtain wound supplies, continue to use the supplies you have available until you are able to reach us. It is most important to keep the wound covered at all times.  It is YOUR responsibility to make sure that supplies are re-ordered before you run out. Re-order telephone numbers are included in each package.     Keep next scheduled appointment. Please give 24 hour notice if unable to keep appointment. [phone]    If you experience any of the following, please call the Wound Care Service during business hours: Monday through Friday 8:00 am - 4:30 pm  ([phone]).   *Increase in

## 2025-04-04 ENCOUNTER — TELEPHONE (OUTPATIENT)
Dept: WOUND CARE | Age: 70
End: 2025-04-04

## 2025-04-04 RX ORDER — SODIUM HYPOCHLORITE 1.25 MG/ML
SOLUTION TOPICAL DAILY
Qty: 473 ML | Refills: 0 | Status: SHIPPED | OUTPATIENT
Start: 2025-04-04

## 2025-04-04 NOTE — TELEPHONE ENCOUNTER
----- Message from Frannie STAFFORD sent at 4/4/2025  8:13 AM EDT -----  Pt called. Stated his prescription for wound wash was not sent over to [facility] and he would like a call back to get that fixed. [phone]

## 2025-04-04 NOTE — TELEPHONE ENCOUNTER
Jack Munson DPM notified that patients pharmacy did not receive Dakins prescription, Jack states he will send that prescription to patients pharmacy now. Patient notified and voices good understanding.

## 2025-05-15 ENCOUNTER — HOSPITAL ENCOUNTER (OUTPATIENT)
Age: 70
Discharge: HOME | End: 2025-05-15
Payer: COMMERCIAL

## 2025-05-15 ENCOUNTER — OFFICE VISIT (OUTPATIENT)
Dept: FAMILY MEDICINE CLINIC | Age: 70
End: 2025-05-15

## 2025-05-15 VITALS
BODY MASS INDEX: 30.03 KG/M2 | HEIGHT: 74 IN | TEMPERATURE: 98.3 F | WEIGHT: 234 LBS | HEART RATE: 74 BPM | SYSTOLIC BLOOD PRESSURE: 138 MMHG | DIASTOLIC BLOOD PRESSURE: 82 MMHG

## 2025-05-15 DIAGNOSIS — F41.8 SITUATIONAL ANXIETY: ICD-10-CM

## 2025-05-15 DIAGNOSIS — L97.521 ULCER OF LEFT FOOT, LIMITED TO BREAKDOWN OF SKIN (HCC): ICD-10-CM

## 2025-05-15 DIAGNOSIS — Z12.5 SCREENING PSA (PROSTATE SPECIFIC ANTIGEN): ICD-10-CM

## 2025-05-15 DIAGNOSIS — Z12.11 SCREEN FOR COLON CANCER: ICD-10-CM

## 2025-05-15 DIAGNOSIS — I10 ESSENTIAL HYPERTENSION: ICD-10-CM

## 2025-05-15 DIAGNOSIS — I10 ESSENTIAL HYPERTENSION: Primary | ICD-10-CM

## 2025-05-15 LAB
ALBUMIN SERPL BCG-MCNC: 4.5 G/DL (ref 3.4–4.9)
ALP SERPL-CCNC: 116 U/L (ref 40–129)
ALT SERPL W/O P-5'-P-CCNC: 34 U/L (ref 10–50)
ANION GAP SERPL CALC-SCNC: 12 MEQ/L (ref 8–16)
AST SERPL-CCNC: 34 U/L (ref 10–50)
BASOPHILS ABSOLUTE: 0 THOU/MM3 (ref 0–0.1)
BASOPHILS NFR BLD AUTO: 0.6 %
BILIRUB SERPL-MCNC: 0.6 MG/DL (ref 0.3–1.2)
BUN SERPL-MCNC: 12 MG/DL (ref 8–23)
CALCIUM SERPL-MCNC: 9.4 MG/DL (ref 8.8–10.2)
CHLORIDE SERPL-SCNC: 103 MEQ/L (ref 98–111)
CHOLEST SERPL-MCNC: 220 MG/DL (ref 100–199)
CO2 SERPL-SCNC: 25 MEQ/L (ref 22–29)
CREAT SERPL-MCNC: 0.9 MG/DL (ref 0.7–1.2)
DEPRECATED RDW RBC AUTO: 42.2 FL (ref 35–45)
EOSINOPHIL NFR BLD AUTO: 2.5 %
EOSINOPHILS ABSOLUTE: 0.2 THOU/MM3 (ref 0–0.4)
ERYTHROCYTE [DISTWIDTH] IN BLOOD BY AUTOMATED COUNT: 13.5 % (ref 11.5–14.5)
GFR SERPL CREATININE-BSD FRML MDRD: > 90 ML/MIN/1.73M2
GLUCOSE SERPL-MCNC: 103 MG/DL (ref 74–109)
HCT VFR BLD AUTO: 42.7 % (ref 42–52)
HDLC SERPL-MCNC: 52 MG/DL
HGB BLD-MCNC: 13.8 GM/DL (ref 14–18)
IMM GRANULOCYTES # BLD AUTO: 0.03 THOU/MM3 (ref 0–0.07)
IMM GRANULOCYTES NFR BLD AUTO: 0.4 %
LDLC SERPL CALC-MCNC: 133 MG/DL
LYMPHOCYTES ABSOLUTE: 2.3 THOU/MM3 (ref 1–4.8)
LYMPHOCYTES NFR BLD AUTO: 29.3 %
MCH RBC QN AUTO: 27.8 PG (ref 26–33)
MCHC RBC AUTO-ENTMCNC: 32.3 GM/DL (ref 32.2–35.5)
MCV RBC AUTO: 85.9 FL (ref 80–94)
MONOCYTES ABSOLUTE: 0.6 THOU/MM3 (ref 0.4–1.3)
MONOCYTES NFR BLD AUTO: 8.1 %
NEUTROPHILS ABSOLUTE: 4.7 THOU/MM3 (ref 1.8–7.7)
NEUTROPHILS NFR BLD AUTO: 59.1 %
NRBC BLD AUTO-RTO: 0 /100 WBC
PLATELET # BLD AUTO: 301 THOU/MM3 (ref 130–400)
PMV BLD AUTO: 10 FL (ref 9.4–12.4)
POTASSIUM SERPL-SCNC: 4.8 MEQ/L (ref 3.5–5.2)
PROT SERPL-MCNC: 7.8 G/DL (ref 6.4–8.3)
PSA SERPL-MCNC: 1.15 NG/ML (ref 0–1)
RBC # BLD AUTO: 4.97 MILL/MM3 (ref 4.7–6.1)
SODIUM SERPL-SCNC: 140 MEQ/L (ref 135–145)
TRIGL SERPL-MCNC: 175 MG/DL (ref 0–199)
WBC # BLD AUTO: 7.9 THOU/MM3 (ref 4.8–10.8)

## 2025-05-15 PROCEDURE — 80053 COMPREHEN METABOLIC PANEL: CPT

## 2025-05-15 PROCEDURE — 36415 COLL VENOUS BLD VENIPUNCTURE: CPT

## 2025-05-15 PROCEDURE — G0103 PSA SCREENING: HCPCS

## 2025-05-15 PROCEDURE — 85025 COMPLETE CBC W/AUTO DIFF WBC: CPT

## 2025-05-15 PROCEDURE — 80061 LIPID PANEL: CPT

## 2025-05-15 RX ORDER — ALPRAZOLAM 0.25 MG
0.25 TABLET ORAL 3 TIMES DAILY PRN
Qty: 90 TABLET | Refills: 0 | Status: SHIPPED | OUTPATIENT
Start: 2025-05-15 | End: 2025-06-14

## 2025-05-15 SDOH — ECONOMIC STABILITY: FOOD INSECURITY: WITHIN THE PAST 12 MONTHS, YOU WORRIED THAT YOUR FOOD WOULD RUN OUT BEFORE YOU GOT MONEY TO BUY MORE.: NEVER TRUE

## 2025-05-15 SDOH — ECONOMIC STABILITY: FOOD INSECURITY: WITHIN THE PAST 12 MONTHS, THE FOOD YOU BOUGHT JUST DIDN'T LAST AND YOU DIDN'T HAVE MONEY TO GET MORE.: NEVER TRUE

## 2025-05-15 ASSESSMENT — ENCOUNTER SYMPTOMS
NAUSEA: 0
COUGH: 0
ABDOMINAL DISTENTION: 0
CONSTIPATION: 0
SHORTNESS OF BREATH: 0
SINUS PRESSURE: 0
ABDOMINAL PAIN: 0
RHINORRHEA: 0
EYE PAIN: 0
SORE THROAT: 0
DIARRHEA: 0

## 2025-05-15 ASSESSMENT — PATIENT HEALTH QUESTIONNAIRE - PHQ9
SUM OF ALL RESPONSES TO PHQ QUESTIONS 1-9: 0
1. LITTLE INTEREST OR PLEASURE IN DOING THINGS: NOT AT ALL
2. FEELING DOWN, DEPRESSED OR HOPELESS: NOT AT ALL
SUM OF ALL RESPONSES TO PHQ QUESTIONS 1-9: 0

## 2025-05-15 NOTE — PROGRESS NOTES
[facility]  [facility]  [address] [address]  Dept: [phone]  Dept Fax: [phone]  Loc: [phone]  PROGRESS NOTE      VisitDate: 5/15/2025    Lewis Samayoa is a 69 y.o. male who presents today for:  Chief Complaint   Patient presents with    Medication Refill     Declined AWV. Xanax refill.       Impression/Plan:  1. Essential hypertension    2. Situational anxiety    3. Screening PSA (prostate specific antigen)    4. Ulcer of left foot, limited to breakdown of skin (HCC)    5. Screen for colon cancer      Requested Prescriptions     Signed Prescriptions Disp Refills    ALPRAZolam (XANAX) 0.25 MG tablet 90 tablet 0     Sig: Take 1 tablet by mouth 3 times daily as needed for Sleep for up to 30 days. Max Daily Amount: 0.75 mg     Orders Placed This Encounter   Procedures    Fecal DNA Colorectal cancer screening (Cologuard)    Lipid Panel     Standing Status:   Future     Expected Date:   5/29/2025     Expiration Date:   5/15/2026     Is Patient Fasting?/# of Hours:   yes/12    CBC with Auto Differential     Standing Status:   Future     Expected Date:   5/29/2025     Expiration Date:   5/15/2026    Comprehensive Metabolic Panel     Standing Status:   Future     Expected Date:   5/29/2025     Expiration Date:   5/15/2026    PSA Screening     Standing Status:   Future     Expected Date:   5/29/2025     Expiration Date:   5/15/2026         Subjective:  History of Present Illness  The patient is a 69-year-old male who presents for medication refill and health maintenance.    He has been managing a wound, which is currently in the process of healing. The frequency of dressing changes varies between daily and every other day, contingent on his pain tolerance.    He is seeking a refill of his alprazolam prescription. He recently had his lisinopril prescription refilled and does not require additional medication at this time. His current medication

## 2025-05-20 ENCOUNTER — HOSPITAL ENCOUNTER (OUTPATIENT)
Dept: WOUND CARE | Age: 70
Discharge: HOME | End: 2025-05-20
Attending: PODIATRIST
Payer: COMMERCIAL

## 2025-05-20 VITALS
RESPIRATION RATE: 18 BRPM | HEART RATE: 98 BPM | SYSTOLIC BLOOD PRESSURE: 165 MMHG | TEMPERATURE: 96.7 F | OXYGEN SATURATION: 98 % | DIASTOLIC BLOOD PRESSURE: 80 MMHG

## 2025-05-20 DIAGNOSIS — I87.2 VENOUS ULCER OF RIGHT LOWER EXTREMITY WITHOUT VARICOSE VEINS (HCC): Primary | ICD-10-CM

## 2025-05-20 DIAGNOSIS — L97.919 VENOUS ULCER OF RIGHT LOWER EXTREMITY WITHOUT VARICOSE VEINS (HCC): Primary | ICD-10-CM

## 2025-05-20 PROCEDURE — 97597 DBRDMT OPN WND 1ST 20 CM/<: CPT

## 2025-05-20 PROCEDURE — 87205 SMEAR GRAM STAIN: CPT

## 2025-05-20 PROCEDURE — 87077 CULTURE AEROBIC IDENTIFY: CPT

## 2025-05-20 PROCEDURE — 87070 CULTURE OTHR SPECIMN AEROBIC: CPT

## 2025-05-20 PROCEDURE — 87186 SC STD MICRODIL/AGAR DIL: CPT

## 2025-05-20 PROCEDURE — 87075 CULTR BACTERIA EXCEPT BLOOD: CPT

## 2025-05-20 PROCEDURE — 87147 CULTURE TYPE IMMUNOLOGIC: CPT

## 2025-05-20 RX ORDER — SILVER SULFADIAZINE 10 MG/G
CREAM TOPICAL PRN
OUTPATIENT
Start: 2025-05-20

## 2025-05-20 RX ORDER — NEOMYCIN/BACITRACIN/POLYMYXINB 3.5-400-5K
OINTMENT (GRAM) TOPICAL PRN
OUTPATIENT
Start: 2025-05-20

## 2025-05-20 RX ORDER — LIDOCAINE HYDROCHLORIDE 40 MG/ML
SOLUTION TOPICAL PRN
OUTPATIENT
Start: 2025-05-20

## 2025-05-20 RX ORDER — MUPIROCIN 20 MG/G
OINTMENT TOPICAL PRN
OUTPATIENT
Start: 2025-05-20

## 2025-05-20 RX ORDER — CLINDAMYCIN HYDROCHLORIDE 300 MG/1
300 CAPSULE ORAL 3 TIMES DAILY
Qty: 30 CAPSULE | Refills: 0 | Status: SHIPPED | OUTPATIENT
Start: 2025-05-20 | End: 2025-05-30

## 2025-05-20 RX ORDER — BACITRACIN ZINC AND POLYMYXIN B SULFATE 500; 1000 [USP'U]/G; [USP'U]/G
OINTMENT TOPICAL PRN
OUTPATIENT
Start: 2025-05-20

## 2025-05-20 RX ORDER — GINSENG 100 MG
CAPSULE ORAL PRN
OUTPATIENT
Start: 2025-05-20

## 2025-05-20 RX ORDER — LIDOCAINE HYDROCHLORIDE 20 MG/ML
JELLY TOPICAL PRN
OUTPATIENT
Start: 2025-05-20

## 2025-05-20 RX ORDER — TRIAMCINOLONE ACETONIDE 1 MG/G
OINTMENT TOPICAL PRN
OUTPATIENT
Start: 2025-05-20

## 2025-05-20 RX ORDER — GENTAMICIN SULFATE 1 MG/G
OINTMENT TOPICAL PRN
OUTPATIENT
Start: 2025-05-20

## 2025-05-20 RX ORDER — LIDOCAINE 50 MG/G
OINTMENT TOPICAL PRN
OUTPATIENT
Start: 2025-05-20

## 2025-05-20 RX ORDER — LIDOCAINE 40 MG/G
CREAM TOPICAL PRN
OUTPATIENT
Start: 2025-05-20

## 2025-05-20 RX ORDER — BETAMETHASONE DIPROPIONATE 0.5 MG/G
CREAM TOPICAL PRN
OUTPATIENT
Start: 2025-05-20

## 2025-05-20 RX ORDER — CLOBETASOL PROPIONATE 0.5 MG/G
OINTMENT TOPICAL PRN
OUTPATIENT
Start: 2025-05-20

## 2025-05-20 RX ORDER — LACTOBACILLUS ACIDOPHILUS 500MM CELL
1 CAPSULE ORAL DAILY
Qty: 60 CAPSULE | Refills: 1 | Status: SHIPPED | OUTPATIENT
Start: 2025-05-20 | End: 2025-09-17

## 2025-05-20 RX ORDER — SODIUM CHLOR/HYPOCHLOROUS ACID 0.033 %
SOLUTION, IRRIGATION IRRIGATION PRN
OUTPATIENT
Start: 2025-05-20

## 2025-05-20 RX ORDER — SULFAMETHOXAZOLE AND TRIMETHOPRIM 800; 160 MG/1; MG/1
1 TABLET ORAL 2 TIMES DAILY
Qty: 28 TABLET | Refills: 0 | Status: SHIPPED | OUTPATIENT
Start: 2025-05-20 | End: 2025-06-03

## 2025-05-20 ASSESSMENT — PAIN DESCRIPTION - ORIENTATION: ORIENTATION: RIGHT

## 2025-05-20 ASSESSMENT — PAIN SCALES - GENERAL: PAINLEVEL_OUTOF10: 5

## 2025-05-20 ASSESSMENT — PAIN DESCRIPTION - LOCATION: LOCATION: FOOT

## 2025-05-20 NOTE — PATIENT INSTRUCTIONS
Visit Discharge/Physician Orders:  - Debridement of right lateral foot, right second toe and right medial leg today  - Elevate legs periodically throughout the day to decrease swelling.   - culture taken today  -antibiotic sent to pharmacy   -will order supplies-call us if you do not receive     Wound Location: Right foot, Right inner lower leg, Right inner ankle, right toes, left foot     Dressing orders:     1) Gather wound care supplies and arrange on clean table.     2) Wash your hands with soap and water or use alcohol based hand  for 20 seconds (sing \"Happy Birthday\" twice).    3) Cleanse wounds with dakins and gauze. Pat dry with clean gauze.    4) Right and left foot, right leg, right toes-  clean all wounds with saline, pat dry with a dry guaze. Apply powdered collagen to all open wounds, moisten with saline. Cover all wounds with abd pad. Apply roll gauze and ace wrap, wrap from base of toes to bend of the knee.  Change dressings three times a week.     Keep all dressings clean & dry.    Follow up visit:  5 Weeks - Tuesday June 24th at 11:00 am     Supplies:  - Mechanical Debridement was completed today by using saline and gauze.     Duration of dressings: 30 days    We have sent your supply order to the following company: [employer] Phone # [phone]   If you don't receive the items you were expecting or don't know what the items are that you received, call the company where the order was sent.   If you are unable to obtain wound supplies, continue to use the supplies you have available until you are able to reach us. It is most important to keep the wound covered at all times.  It is YOUR responsibility to make sure that supplies are re-ordered before you run out. Re-order telephone numbers are included in each package.     Keep next scheduled appointment. Please give 24 hour notice if unable to keep appointment. [phone]    If you experience any of the following, please call the Wound Care

## 2025-05-20 NOTE — PLAN OF CARE
Problem: Wound:  Goal: Will show signs of wound healing; wound closure and no evidence of infection  Description: Will show signs of wound healing; wound closure and no evidence of infection  Outcome: Progressing   Seen today for bilateral feet and leg wounds. See AVS for discharge   Care plan reviewed with patient.  Patient verbalize understanding of the plan of care and contribute to goal setting.

## 2025-05-20 NOTE — PROGRESS NOTES
Wound Care Supplies      Supply Company:     [employer] [address] [place] [address] f: [phone] f: [phone] p: [phone] [email or web address]      Ordering Center:   [facility]  [address] [facility] [address] [place] [address]  [phone]  WOUND CARE Dept: [phone]   FAX NUMBER [phone]    Patient Information:      Lewis Ibarra  [address] [place] [address]   [phone]   : 1955  AGE: 69 y.o.     GENDER: male   EPISODE DATE: 2025    Insurance:      PRIMARY INSURANCE:  Plan: [employer] [place]  Coverage[place]  Effective Date: 2025  Group Number: [unfilled]  Subscriber Number: [id number] - ([place] [employer])    Payer/Plan Subscr  Sex Relation Sub. Ins. ID Effective Group Num   1. [place] BCBS - ANT* VLADISLAV IBARRA 1960 Female Spouse [id number]                                   [address], [facility] [address]   2. MEDICARE - MELEWIS TALAVERA 1955 Male Self [id number]                                    [address] [address]       Patient Wound Information:      Problem List Items Addressed This Visit          Musculoskeletal and Integument    Venous ulcer of right lower extremity without varicose veins (HCC) - Primary    Relevant Orders    MD COMMUNICATION 1    MD COMMUNICATION 2    Culture, Anaerobic and Aerobic    Initiate Outpatient Wound Care Protocol    Initiate Oxygen Therapy Protocol       WOUNDS REQUIRING DRESSING SUPPLIES:     Wound 06/09/15 Other (Comment) Foot Right;Lateral #1 Right lateral foot  (Active)   Number of days: 3633       Wound 25 Foot Right;Lateral (Active)   Wound Image   25 1329   Wound Etiology Venous 25 1329   Dressing Status New dressing applied 25 1329   Wound Cleansed Cleansed with saline 25 1329   Dressing/Treatment ABD;Ace wrap;Collagen;Roll gauze 25 1329   Offloading for Diabetic Foot Ulcers Offloading not required;Offloading not 
Grandmother     Heart Disease Maternal Grandmother         PACEMAKER    Cancer Maternal Grandmother     Breast Cancer Maternal Grandmother     Cancer Mother         liver    High Blood Pressure Neg Hx     Stroke Neg Hx        SOCIAL HISTORY    Social History     Tobacco Use    Smoking status: Former     Current packs/day: 0.00     Average packs/day: 0.3 packs/day for 7.0 years (1.8 ttl pk-yrs)     Types: Cigarettes     Start date:      Quit date:      Years since quittin.4     Passive exposure: Never    Smokeless tobacco: Never   Vaping Use    Vaping status: Never Used   Substance Use Topics    Alcohol use: Yes     Comment: occasional    Drug use: Yes     Frequency: 7.0 times per week     Types: Marijuana (Weed)     Comment: medical marijuana       ALLERGIES    Allergies   Allergen Reactions    Penicillin G Other (See Comments)    Amoxil [Amoxicillin] Itching    Lactose Diarrhea    Tape [Adhesive Tape] Itching       MEDICATIONS    Current Outpatient Medications on File Prior to Encounter   Medication Sig Dispense Refill    ALPRAZolam (XANAX) 0.25 MG tablet Take 1 tablet by mouth 3 times daily as needed for Sleep for up to 30 days. Max Daily Amount: 0.75 mg 90 tablet 0    lisinopril (PRINIVIL;ZESTRIL) 20 MG tablet Take 1 tablet by mouth daily 90 tablet 3     No current facility-administered medications on file prior to encounter.       REVIEW OF SYSTEMS    Pertinent items are noted in HPI.    Objective:      BP (!) 165/80   Pulse 98   Temp (!) 96.7 °F (35.9 °C) (Infrared)   Resp 18   SpO2 98%     Wt Readings from Last 3 Encounters:   05/15/25 106.1 kg (234 lb)   12/10/24 98.8 kg (217 lb 12.8 oz)   24 99.3 kg (219 lb)       PHYSICAL EXAM  Vascular: Dorsalis pedis and posterior tibial pulses are  2/4 palpable bilaterally. Skin temperature is warm to warm from proximal tibial tuberosity to distal digits. CFT brisk to exposed digits. Edema present to right lower extremity 1-2+ pitting in nature.

## 2025-05-25 LAB
BACTERIA SPEC AEROBE CULT: ABNORMAL
BACTERIA SPEC AEROBE CULT: ABNORMAL
BACTERIA SPEC ANAEROBE CULT: ABNORMAL
GRAM STN SPEC: ABNORMAL
ORGANISM: ABNORMAL

## 2025-05-27 ENCOUNTER — TELEPHONE (OUTPATIENT)
Dept: WOUND CARE | Age: 70
End: 2025-05-27

## 2025-05-27 NOTE — TELEPHONE ENCOUNTER
Secure message via [employer] sent to Dr. Abraham [facility] resident.    \"patient was seen 5/20/25 in the clinic. wound cultures were sent. patient was placed on Clindamycin and Bactrim at that time.  final culture results are back.  could you please review them to make sure the antibiotics he is on are correct.    If something needs changed please notify patient.   thank you \"

## 2025-06-15 LAB — NONINV COLON CA DNA+OCC BLD SCRN STL QL: NORMAL

## 2025-06-24 ENCOUNTER — HOSPITAL ENCOUNTER (OUTPATIENT)
Dept: WOUND CARE | Age: 70
Discharge: HOME | End: 2025-06-24
Attending: PODIATRIST
Payer: COMMERCIAL

## 2025-06-24 VITALS
SYSTOLIC BLOOD PRESSURE: 171 MMHG | DIASTOLIC BLOOD PRESSURE: 97 MMHG | OXYGEN SATURATION: 94 % | TEMPERATURE: 97.4 F | HEART RATE: 83 BPM

## 2025-06-24 DIAGNOSIS — I87.2 VENOUS ULCER OF RIGHT LOWER EXTREMITY WITHOUT VARICOSE VEINS (HCC): Primary | ICD-10-CM

## 2025-06-24 DIAGNOSIS — L97.919 VENOUS ULCER OF RIGHT LOWER EXTREMITY WITHOUT VARICOSE VEINS (HCC): Primary | ICD-10-CM

## 2025-06-24 PROCEDURE — 99213 OFFICE O/P EST LOW 20 MIN: CPT

## 2025-06-24 RX ORDER — BETAMETHASONE DIPROPIONATE 0.5 MG/G
CREAM TOPICAL PRN
OUTPATIENT
Start: 2025-06-24

## 2025-06-24 RX ORDER — GINSENG 100 MG
CAPSULE ORAL PRN
OUTPATIENT
Start: 2025-06-24

## 2025-06-24 RX ORDER — NEOMYCIN/BACITRACIN/POLYMYXINB 3.5-400-5K
OINTMENT (GRAM) TOPICAL PRN
OUTPATIENT
Start: 2025-06-24

## 2025-06-24 RX ORDER — GENTAMICIN SULFATE 1 MG/G
OINTMENT TOPICAL PRN
OUTPATIENT
Start: 2025-06-24

## 2025-06-24 RX ORDER — BACITRACIN ZINC AND POLYMYXIN B SULFATE 500; 1000 [USP'U]/G; [USP'U]/G
OINTMENT TOPICAL PRN
OUTPATIENT
Start: 2025-06-24

## 2025-06-24 RX ORDER — TRIAMCINOLONE ACETONIDE 1 MG/G
OINTMENT TOPICAL PRN
OUTPATIENT
Start: 2025-06-24

## 2025-06-24 RX ORDER — LIDOCAINE HYDROCHLORIDE 20 MG/ML
JELLY TOPICAL PRN
OUTPATIENT
Start: 2025-06-24

## 2025-06-24 RX ORDER — LIDOCAINE 40 MG/G
CREAM TOPICAL PRN
OUTPATIENT
Start: 2025-06-24

## 2025-06-24 RX ORDER — LIDOCAINE 50 MG/G
OINTMENT TOPICAL PRN
OUTPATIENT
Start: 2025-06-24

## 2025-06-24 RX ORDER — CLOBETASOL PROPIONATE 0.5 MG/G
OINTMENT TOPICAL PRN
OUTPATIENT
Start: 2025-06-24

## 2025-06-24 RX ORDER — LIDOCAINE HYDROCHLORIDE 40 MG/ML
SOLUTION TOPICAL PRN
OUTPATIENT
Start: 2025-06-24

## 2025-06-24 RX ORDER — SILVER SULFADIAZINE 10 MG/G
CREAM TOPICAL PRN
OUTPATIENT
Start: 2025-06-24

## 2025-06-24 RX ORDER — SODIUM CHLOR/HYPOCHLOROUS ACID 0.033 %
SOLUTION, IRRIGATION IRRIGATION PRN
OUTPATIENT
Start: 2025-06-24

## 2025-06-24 RX ORDER — MUPIROCIN 2 %
OINTMENT (GRAM) TOPICAL PRN
OUTPATIENT
Start: 2025-06-24

## 2025-06-24 ASSESSMENT — PAIN SCALES - GENERAL: PAINLEVEL_OUTOF10: 0

## 2025-06-24 NOTE — PATIENT INSTRUCTIONS
Visit Discharge/Physician Orders:  - Surgical debridement option discussed today, Dr. Johnson's primary office will contact you to schedule.   - Mechanical debridement of right lateral foot, right second toe and right medial leg today  - Elevate legs periodically throughout the day to decrease swelling.    - Will order supplies-call us if you do not receive     Wound Location: Right foot, Right medial lower leg, Right medial ankle, right 2nd toe, right great toe, left foot     Dressing orders:     1) Gather wound care supplies and arrange on clean table.     2) Wash your hands with soap and water or use alcohol based hand  for 20 seconds (sing \"Happy Birthday\" twice).    3) Cleanse wounds with hibiclens and gauze. Rinse with saline. Pat dry with clean gauze.    4) Right and left foot, right leg, right toes-  Apply Triad paste to the wounds. Cover all wounds with abd pad. Apply roll gauze and ace wrap, wrap from base of toes to bend of the knee.  Change dressings three times a week.     Keep all dressings clean & dry.    Follow up visit:  Pending surgery date    Supplies:  - Mechanical Debridement was completed today by using saline and gauze.     Duration of dressings: 30 days    We have sent your supply order to the following company: [id number] Phone # [phone]   If you don't receive the items you were expecting or don't know what the items are that you received, call the company where the order was sent.   If you are unable to obtain wound supplies, continue to use the supplies you have available until you are able to reach us. It is most important to keep the wound covered at all times.  It is YOUR responsibility to make sure that supplies are re-ordered before you run out. Re-order telephone numbers are included in each package.     Keep next scheduled appointment. Please give 24 hour notice if unable to keep appointment. [phone]    If you experience any of the following, please call the Wound Care

## 2025-06-24 NOTE — PLAN OF CARE
Problem: Wound:  Goal: Will show signs of wound healing; wound closure and no evidence of infection  Description: Will show signs of wound healing; wound closure and no evidence of infection  Outcome: Progressing    Patient seen at the wound clinic today for evaluation of left foot, right foot, right ankle and right leg wounds, please see AVS. Care plan reviewed with patient.  Patient verbalizes understanding of the plan of care and contribute to goal setting.

## 2025-07-23 DIAGNOSIS — F41.8 SITUATIONAL ANXIETY: ICD-10-CM

## 2025-07-23 RX ORDER — ALPRAZOLAM 0.25 MG
0.25 TABLET ORAL 3 TIMES DAILY PRN
Qty: 90 TABLET | Refills: 0 | Status: SHIPPED | OUTPATIENT
Start: 2025-07-23 | End: 2025-08-22

## 2025-08-04 ENCOUNTER — TELEPHONE (OUTPATIENT)
Dept: WOUND CARE | Age: 70
End: 2025-08-04

## 2025-08-05 ENCOUNTER — TELEPHONE (OUTPATIENT)
Dept: WOUND CARE | Age: 70
End: 2025-08-05

## 2025-08-15 ENCOUNTER — OFFICE VISIT (OUTPATIENT)
Dept: FAMILY MEDICINE CLINIC | Age: 70
End: 2025-08-15

## 2025-08-15 VITALS
RESPIRATION RATE: 16 BRPM | HEIGHT: 74 IN | WEIGHT: 233 LBS | DIASTOLIC BLOOD PRESSURE: 82 MMHG | BODY MASS INDEX: 29.9 KG/M2 | HEART RATE: 80 BPM | OXYGEN SATURATION: 98 % | SYSTOLIC BLOOD PRESSURE: 138 MMHG | TEMPERATURE: 97.6 F

## 2025-08-15 DIAGNOSIS — I87.2 VENOUS ULCER OF RIGHT LOWER EXTREMITY WITHOUT VARICOSE VEINS (HCC): ICD-10-CM

## 2025-08-15 DIAGNOSIS — Z00.00 MEDICARE ANNUAL WELLNESS VISIT, INITIAL: ICD-10-CM

## 2025-08-15 DIAGNOSIS — Z00.00 INITIAL MEDICARE ANNUAL WELLNESS VISIT: Primary | ICD-10-CM

## 2025-08-15 DIAGNOSIS — L97.919 VENOUS ULCER OF RIGHT LOWER EXTREMITY WITHOUT VARICOSE VEINS (HCC): ICD-10-CM

## 2025-08-15 DIAGNOSIS — I10 ESSENTIAL HYPERTENSION: ICD-10-CM

## 2025-08-15 DIAGNOSIS — F41.8 SITUATIONAL ANXIETY: ICD-10-CM

## 2025-08-15 ASSESSMENT — LIFESTYLE VARIABLES
HOW MANY STANDARD DRINKS CONTAINING ALCOHOL DO YOU HAVE ON A TYPICAL DAY: 1 OR 2
HOW OFTEN DO YOU HAVE A DRINK CONTAINING ALCOHOL: MONTHLY OR LESS

## 2025-08-15 ASSESSMENT — PATIENT HEALTH QUESTIONNAIRE - PHQ9
SUM OF ALL RESPONSES TO PHQ QUESTIONS 1-9: 0
2. FEELING DOWN, DEPRESSED OR HOPELESS: NOT AT ALL
1. LITTLE INTEREST OR PLEASURE IN DOING THINGS: NOT AT ALL

## (undated) DEVICE — PATIENT RETURN ELECTRODE, SINGLE-USE, CONTACT QUALITY MONITORING, ADULT, WITH 9FT CORD, FOR PATIENTS WEIGING OVER 33LBS. (15KG): Brand: MEGADYNE

## (undated) DEVICE — GAUZE,SPONGE,8"X4",12PLY,XRAY,STRL,LF: Brand: MEDLINE

## (undated) DEVICE — PADDING CAST W6INXL4YD POLY POR SPUN DACRON SYN VERSATILE

## (undated) DEVICE — PRECISION (9.0 X 0.51 X 31.0MM)

## (undated) DEVICE — SPONGE LAP W18XL18IN WHT COT 4 PLY FLD STRUNG RADPQ DISP ST

## (undated) DEVICE — DRESSING ALG W3XL4IN TRNSPAR ANTIMIC WND CNTCT LAYR W/

## (undated) DEVICE — PLATELET CONCENTRATION KIT ERYTHROCYTE MAR STRL DISP

## (undated) DEVICE — Device

## (undated) DEVICE — SOLUTION IV 1000ML LAC RINGERS PH 6.5 INJ USP VIAFLX PLAS

## (undated) DEVICE — SUTURE VCRL SZ 2-0 L27IN ABSRB UD L26MM SH 1/2 CIR J417H

## (undated) DEVICE — SYRINGE IRRIG 60ML SFT PLIABLE BLB EZ TO GRP 1 HND USE W/

## (undated) DEVICE — CHLORAPREP 26ML ORANGE

## (undated) DEVICE — COVER ARMBRD W13XL28.5IN IMPERV BLU FOR OP RM

## (undated) DEVICE — SUTURE VCRL SZ 3-0 L27IN ABSRB UD FS-2 L19MM 1/2 CIR J423H

## (undated) DEVICE — SPONGE GZ W4XL4IN COT 12 PLY TYP VII WVN C FLD DSGN

## (undated) DEVICE — STIMULATOR BONE GROWTH PHYSIO-STIM

## (undated) DEVICE — PADDING CAST W4INXL4YD SPUN DACRON POLY POR SYN VERSATILE

## (undated) DEVICE — BANDAGE COMPR W4INXL12FT E DISP ESMARCH EVEN

## (undated) DEVICE — INTENDED FOR TISSUE SEPARATION, AND OTHER PROCEDURES THAT REQUIRE A SHARP SURGICAL BLADE TO PUNCTURE OR CUT.: Brand: BARD-PARKER ® CARBON RIB-BACK BLADES

## (undated) DEVICE — SUTURE PROL SZ 3-0 L18IN NONABSORBABLE BLU L30MM FS-1 3/8 8663G

## (undated) DEVICE — BANDAGE,GAUZE,4.5"X4.1YD,STERILE,LF: Brand: MEDLINE

## (undated) DEVICE — GLOVE SURG SZ 65 THK91MIL LTX FREE SYN POLYISOPRENE

## (undated) DEVICE — KIT DRL BIT DIA2.65MM CORRESPONDING INCL DRL GUID HNDL LOC

## (undated) DEVICE — SOLUTION SURG PREP POV IOD 7.5% 4 OZ

## (undated) DEVICE — GLOVE ORANGE PI 8   MSG9080

## (undated) DEVICE — SUTURE MCRYL SZ 3-0 L27IN ABSRB UD L24MM PS-1 3/8 CIR PRIM Y936H

## (undated) DEVICE — SUTURE MCRYL SZ 4-0 L27IN ABSRB UD L19MM PS-2 1/2 CIR PRIM Y426H

## (undated) DEVICE — GUIDE SZ ANCIL FOR CONT COMPR IMPL

## (undated) DEVICE — BANDAGE COMPR E SGL LAYERED CLSR BGE W/ CLP W4INXL15FT

## (undated) DEVICE — GOWN,SIRUS,NON REINFRCD,LARGE,SET IN SL: Brand: MEDLINE

## (undated) DEVICE — PADDING,UNDERCAST,COTTON, 4"X4YD STERILE: Brand: MEDLINE

## (undated) DEVICE — PREP SOL PVP IODINE 4%  4 OZ/BTL

## (undated) DEVICE — GLOVE ORANGE PI 7 1/2   MSG9075

## (undated) DEVICE — BIT DRL L300MM DIA5MM CANN QUIK CPL ADJ STP REUSE

## (undated) DEVICE — DRAPE C ARM W36XL30IN RECTANG BND BG AND TAPE

## (undated) DEVICE — GOWN,SIRUS,NONRNF,SETINSLV,XL,20/CS: Brand: MEDLINE

## (undated) DEVICE — HYPODERMIC SAFETY NEEDLE: Brand: MAGELLAN

## (undated) DEVICE — SOLUTION IV 1000ML 0.9% SOD CHL PH 5 INJ USP VIAFLX PLAS